# Patient Record
Sex: FEMALE | Race: BLACK OR AFRICAN AMERICAN | Employment: OTHER | URBAN - METROPOLITAN AREA
[De-identification: names, ages, dates, MRNs, and addresses within clinical notes are randomized per-mention and may not be internally consistent; named-entity substitution may affect disease eponyms.]

---

## 2017-01-04 ENCOUNTER — TELEPHONE (OUTPATIENT)
Dept: INTERNAL MEDICINE CLINIC | Age: 67
End: 2017-01-04

## 2017-01-05 ENCOUNTER — OFFICE VISIT (OUTPATIENT)
Dept: INTERNAL MEDICINE CLINIC | Age: 67
End: 2017-01-05

## 2017-01-05 VITALS
OXYGEN SATURATION: 97 % | HEIGHT: 66 IN | TEMPERATURE: 98.2 F | HEART RATE: 83 BPM | SYSTOLIC BLOOD PRESSURE: 138 MMHG | RESPIRATION RATE: 14 BRPM | DIASTOLIC BLOOD PRESSURE: 78 MMHG | WEIGHT: 238.6 LBS | BODY MASS INDEX: 38.35 KG/M2

## 2017-01-05 DIAGNOSIS — R80.9 MICROALBUMINURIA: ICD-10-CM

## 2017-01-05 DIAGNOSIS — E55.9 VITAMIN D DEFICIENCY: ICD-10-CM

## 2017-01-05 DIAGNOSIS — I10 ESSENTIAL HYPERTENSION: ICD-10-CM

## 2017-01-05 DIAGNOSIS — H04.203 WATERY EYES: ICD-10-CM

## 2017-01-05 PROCEDURE — 99214 OFFICE O/P EST MOD 30 MIN: CPT | Performed by: INTERNAL MEDICINE

## 2017-01-05 RX ORDER — GLIPIZIDE 5 MG/1
5 TABLET, FILM COATED, EXTENDED RELEASE ORAL DAILY
Qty: 30 TABLET | Refills: 3 | Status: SHIPPED | OUTPATIENT
Start: 2017-01-05 | End: 2017-03-23

## 2017-01-05 RX ORDER — OLOPATADINE HYDROCHLORIDE 2 MG/ML
1 SOLUTION/ DROPS OPHTHALMIC DAILY
Qty: 2.5 ML | Refills: 3 | Status: SHIPPED | OUTPATIENT
Start: 2017-01-05 | End: 2017-08-16 | Stop reason: SDUPTHER

## 2017-01-05 RX ORDER — ROSUVASTATIN CALCIUM 20 MG/1
TABLET, COATED ORAL
Qty: 90 TABLET | Refills: 1 | Status: SHIPPED | OUTPATIENT
Start: 2017-01-05 | End: 2017-03-23 | Stop reason: ALTCHOICE

## 2017-01-05 RX ORDER — VALSARTAN AND HYDROCHLOROTHIAZIDE 160; 25 MG/1; MG/1
1 TABLET ORAL DAILY
Qty: 90 TABLET | Refills: 1 | Status: SHIPPED | OUTPATIENT
Start: 2017-01-05 | End: 2017-08-16 | Stop reason: CLARIF

## 2017-01-05 RX ORDER — CALCIUM CARB/VITAMIN D3/VIT K1 500-100-40
TABLET,CHEWABLE ORAL
Qty: 200 EACH | Refills: 3 | Status: SHIPPED | OUTPATIENT
Start: 2017-01-05 | End: 2018-08-24 | Stop reason: SDUPTHER

## 2017-01-05 RX ORDER — IBUPROFEN 800 MG/1
800 TABLET ORAL 3 TIMES DAILY PRN
Qty: 60 TABLET | Refills: 0 | Status: SHIPPED | OUTPATIENT
Start: 2017-01-05 | End: 2020-08-29 | Stop reason: SDUPTHER

## 2017-01-06 ENCOUNTER — TELEPHONE (OUTPATIENT)
Dept: INTERNAL MEDICINE CLINIC | Age: 67
End: 2017-01-06

## 2017-01-09 ASSESSMENT — ENCOUNTER SYMPTOMS
VOMITING: 0
NAUSEA: 0
WHEEZING: 0
DIARRHEA: 0
BLOOD IN STOOL: 0
SHORTNESS OF BREATH: 0
SINUS PRESSURE: 0
RHINORRHEA: 0
CHEST TIGHTNESS: 0
SORE THROAT: 0
CONSTIPATION: 0
COUGH: 0
ABDOMINAL PAIN: 0

## 2017-01-10 ENCOUNTER — TELEPHONE (OUTPATIENT)
Dept: INTERNAL MEDICINE CLINIC | Age: 67
End: 2017-01-10

## 2017-01-10 ENCOUNTER — CARE COORDINATION (OUTPATIENT)
Dept: CARE COORDINATION | Age: 67
End: 2017-01-10

## 2017-01-23 ENCOUNTER — CARE COORDINATION (OUTPATIENT)
Dept: CARE COORDINATION | Age: 67
End: 2017-01-23

## 2017-01-31 ENCOUNTER — CARE COORDINATION (OUTPATIENT)
Dept: CARE COORDINATION | Age: 67
End: 2017-01-31

## 2017-02-06 ENCOUNTER — CARE COORDINATION (OUTPATIENT)
Dept: CARE COORDINATION | Age: 67
End: 2017-02-06

## 2017-03-09 RX ORDER — ROSUVASTATIN CALCIUM 20 MG/1
20 TABLET, FILM COATED ORAL NIGHTLY
Qty: 90 TABLET | Refills: 1 | Status: SHIPPED | OUTPATIENT
Start: 2017-03-09 | End: 2017-09-09 | Stop reason: SDUPTHER

## 2017-03-23 ENCOUNTER — OFFICE VISIT (OUTPATIENT)
Dept: INTERNAL MEDICINE CLINIC | Age: 67
End: 2017-03-23

## 2017-03-23 VITALS
TEMPERATURE: 98.2 F | BODY MASS INDEX: 37.99 KG/M2 | HEART RATE: 86 BPM | RESPIRATION RATE: 14 BRPM | OXYGEN SATURATION: 98 % | WEIGHT: 236.4 LBS | HEIGHT: 66 IN | DIASTOLIC BLOOD PRESSURE: 70 MMHG | SYSTOLIC BLOOD PRESSURE: 138 MMHG

## 2017-03-23 DIAGNOSIS — E78.5 HYPERLIPIDEMIA, UNSPECIFIED HYPERLIPIDEMIA TYPE: ICD-10-CM

## 2017-03-23 DIAGNOSIS — I10 ESSENTIAL HYPERTENSION: ICD-10-CM

## 2017-03-23 LAB — HBA1C MFR BLD: 11.3 %

## 2017-03-23 PROCEDURE — 83036 HEMOGLOBIN GLYCOSYLATED A1C: CPT | Performed by: INTERNAL MEDICINE

## 2017-03-23 PROCEDURE — 99214 OFFICE O/P EST MOD 30 MIN: CPT | Performed by: INTERNAL MEDICINE

## 2017-03-23 PROCEDURE — 4010F ACE/ARB THERAPY RXD/TAKEN: CPT | Performed by: INTERNAL MEDICINE

## 2017-03-23 RX ORDER — INSULIN LISPRO 100 [IU]/ML
INJECTION, SOLUTION INTRAVENOUS; SUBCUTANEOUS
Qty: 45 ML | Refills: 2 | OUTPATIENT
Start: 2017-03-23

## 2017-03-23 ASSESSMENT — ENCOUNTER SYMPTOMS
SORE THROAT: 0
DIARRHEA: 0
CHEST TIGHTNESS: 0
COUGH: 0
NAUSEA: 0
VOMITING: 0
CONSTIPATION: 0
ABDOMINAL PAIN: 0
RHINORRHEA: 0
BLOOD IN STOOL: 0
SINUS PRESSURE: 0
SHORTNESS OF BREATH: 0
WHEEZING: 0

## 2017-04-04 RX ORDER — INSULIN GLARGINE 100 [IU]/ML
INJECTION, SOLUTION SUBCUTANEOUS
Qty: 40 ML | Refills: 5 | Status: SHIPPED | OUTPATIENT
Start: 2017-04-04 | End: 2017-11-06 | Stop reason: SDUPTHER

## 2017-04-11 DIAGNOSIS — I10 ESSENTIAL HYPERTENSION: ICD-10-CM

## 2017-04-11 RX ORDER — BLOOD-GLUCOSE METER
EACH MISCELLANEOUS
Qty: 1 KIT | Refills: 0 | Status: CANCELLED | OUTPATIENT
Start: 2017-04-11

## 2017-04-11 RX ORDER — VALSARTAN AND HYDROCHLOROTHIAZIDE 160; 25 MG/1; MG/1
1 TABLET ORAL DAILY
Qty: 90 TABLET | Refills: 1 | Status: CANCELLED | OUTPATIENT
Start: 2017-04-11

## 2017-04-13 RX ORDER — BLOOD-GLUCOSE METER
EACH MISCELLANEOUS
Qty: 1 KIT | Refills: 0 | Status: SHIPPED | OUTPATIENT
Start: 2017-04-13

## 2017-04-18 ENCOUNTER — CARE COORDINATION (OUTPATIENT)
Dept: CARE COORDINATION | Age: 67
End: 2017-04-18

## 2017-04-19 ENCOUNTER — TELEPHONE (OUTPATIENT)
Dept: INTERNAL MEDICINE CLINIC | Age: 67
End: 2017-04-19

## 2017-04-21 ENCOUNTER — TELEPHONE (OUTPATIENT)
Dept: INTERNAL MEDICINE CLINIC | Age: 67
End: 2017-04-21

## 2017-05-16 ENCOUNTER — TELEPHONE (OUTPATIENT)
Dept: INTERNAL MEDICINE CLINIC | Age: 67
End: 2017-05-16

## 2017-05-18 ENCOUNTER — TELEPHONE (OUTPATIENT)
Dept: INTERNAL MEDICINE CLINIC | Age: 67
End: 2017-05-18

## 2017-06-03 ENCOUNTER — TELEPHONE (OUTPATIENT)
Dept: INTERNAL MEDICINE CLINIC | Age: 67
End: 2017-06-03

## 2017-06-06 RX ORDER — LOSARTAN POTASSIUM AND HYDROCHLOROTHIAZIDE 25; 100 MG/1; MG/1
TABLET ORAL
Qty: 90 TABLET | Refills: 0 | Status: SHIPPED | OUTPATIENT
Start: 2017-06-06 | End: 2017-09-09 | Stop reason: SDUPTHER

## 2017-06-13 LAB
DIABETIC RETINOPATHY: NORMAL
DIABETIC RETINOPATHY: NORMAL

## 2017-07-06 DIAGNOSIS — I10 ESSENTIAL HYPERTENSION: ICD-10-CM

## 2017-07-06 RX ORDER — VALSARTAN AND HYDROCHLOROTHIAZIDE 160; 25 MG/1; MG/1
1 TABLET ORAL DAILY
Qty: 90 TABLET | Refills: 1 | Status: CANCELLED | OUTPATIENT
Start: 2017-07-06

## 2017-07-24 LAB — DIABETIC RETINOPATHY: NORMAL

## 2017-08-16 ENCOUNTER — OFFICE VISIT (OUTPATIENT)
Dept: INTERNAL MEDICINE CLINIC | Age: 67
End: 2017-08-16

## 2017-08-16 VITALS
OXYGEN SATURATION: 97 % | DIASTOLIC BLOOD PRESSURE: 82 MMHG | BODY MASS INDEX: 38.57 KG/M2 | HEART RATE: 88 BPM | HEIGHT: 66 IN | TEMPERATURE: 98.1 F | WEIGHT: 240 LBS | SYSTOLIC BLOOD PRESSURE: 130 MMHG

## 2017-08-16 DIAGNOSIS — H57.89 EYE REDNESS: Primary | ICD-10-CM

## 2017-08-16 DIAGNOSIS — Z91.199 NON-COMPLIANCE WITH TREATMENT: ICD-10-CM

## 2017-08-16 DIAGNOSIS — H10.13 ALLERGIC CONJUNCTIVITIS, BILATERAL: ICD-10-CM

## 2017-08-16 DIAGNOSIS — Z79.4 TYPE 2 DIABETES MELLITUS WITH BOTH EYES AFFECTED BY RETINOPATHY AND MACULAR EDEMA, WITH LONG-TERM CURRENT USE OF INSULIN, UNSPECIFIED RETINOPATHY SEVERITY (HCC): ICD-10-CM

## 2017-08-16 DIAGNOSIS — H61.23 BILATERAL IMPACTED CERUMEN: ICD-10-CM

## 2017-08-16 DIAGNOSIS — E11.311 TYPE 2 DIABETES MELLITUS WITH BOTH EYES AFFECTED BY RETINOPATHY AND MACULAR EDEMA, WITH LONG-TERM CURRENT USE OF INSULIN, UNSPECIFIED RETINOPATHY SEVERITY (HCC): ICD-10-CM

## 2017-08-16 LAB — HBA1C MFR BLD: 10.3 %

## 2017-08-16 PROCEDURE — 83036 HEMOGLOBIN GLYCOSYLATED A1C: CPT | Performed by: NURSE PRACTITIONER

## 2017-08-16 PROCEDURE — 99214 OFFICE O/P EST MOD 30 MIN: CPT | Performed by: NURSE PRACTITIONER

## 2017-08-16 RX ORDER — KETOTIFEN FUMARATE 0.35 MG/ML
1 SOLUTION/ DROPS OPHTHALMIC 2 TIMES DAILY
Qty: 10 ML | Refills: 0 | Status: SHIPPED | OUTPATIENT
Start: 2017-08-16 | End: 2017-08-16 | Stop reason: ALTCHOICE

## 2017-08-16 RX ORDER — OLOPATADINE HYDROCHLORIDE 2 MG/ML
1 SOLUTION/ DROPS OPHTHALMIC DAILY
Qty: 2.5 ML | Refills: 3 | Status: SHIPPED | OUTPATIENT
Start: 2017-08-16 | End: 2018-03-02 | Stop reason: SDUPTHER

## 2017-08-16 RX ORDER — TRIAMCINOLONE ACETONIDE 55 UG/1
2 SPRAY, METERED NASAL DAILY
Qty: 1 INHALER | Refills: 3 | Status: SHIPPED | OUTPATIENT
Start: 2017-08-16 | End: 2019-07-25

## 2017-08-16 ASSESSMENT — PATIENT HEALTH QUESTIONNAIRE - PHQ9
SUM OF ALL RESPONSES TO PHQ9 QUESTIONS 1 & 2: 0
SUM OF ALL RESPONSES TO PHQ QUESTIONS 1-9: 0
1. LITTLE INTEREST OR PLEASURE IN DOING THINGS: 0
2. FEELING DOWN, DEPRESSED OR HOPELESS: 0

## 2017-08-16 ASSESSMENT — ENCOUNTER SYMPTOMS: RHINORRHEA: 0

## 2017-08-30 ENCOUNTER — HOSPITAL ENCOUNTER (OUTPATIENT)
Dept: DIABETES SERVICES | Age: 67
Discharge: HOME | End: 2017-08-31
Admitting: INTERNAL MEDICINE

## 2017-08-30 DIAGNOSIS — E11.311 TYPE 2 DIABETES MELLITUS WITH DIABETIC RETINOPATHY AND MACULAR EDEMA (HCC): ICD-10-CM

## 2017-08-30 ASSESSMENT — PATIENT HEALTH QUESTIONNAIRE - PHQ9
SUM OF ALL RESPONSES TO PHQ9 QUESTIONS 1 & 2: 0
1. LITTLE INTEREST OR PLEASURE IN DOING THINGS: 0
SUM OF ALL RESPONSES TO PHQ QUESTIONS 1-9: 0
2. FEELING DOWN, DEPRESSED OR HOPELESS: 0

## 2017-09-01 ENCOUNTER — TELEPHONE (OUTPATIENT)
Dept: INTERNAL MEDICINE CLINIC | Age: 67
End: 2017-09-01

## 2017-09-05 ENCOUNTER — TELEPHONE (OUTPATIENT)
Dept: INTERNAL MEDICINE CLINIC | Age: 67
End: 2017-09-05

## 2017-09-06 ENCOUNTER — HOSPITAL ENCOUNTER (OUTPATIENT)
Dept: DIABETES SERVICES | Age: 67
Discharge: HOME | End: 2017-09-06
Admitting: INTERNAL MEDICINE

## 2017-09-06 ENCOUNTER — TELEPHONE (OUTPATIENT)
Dept: INTERNAL MEDICINE CLINIC | Age: 67
End: 2017-09-06

## 2017-09-06 DIAGNOSIS — E11.311 TYPE 2 DIABETES MELLITUS WITH DIABETIC RETINOPATHY AND MACULAR EDEMA (HCC): ICD-10-CM

## 2017-09-11 RX ORDER — ROSUVASTATIN CALCIUM 20 MG/1
TABLET, FILM COATED ORAL
Qty: 90 TABLET | Refills: 0 | Status: SHIPPED | OUTPATIENT
Start: 2017-09-11 | End: 2017-10-08 | Stop reason: SDUPTHER

## 2017-09-11 RX ORDER — LOSARTAN POTASSIUM AND HYDROCHLOROTHIAZIDE 25; 100 MG/1; MG/1
TABLET ORAL
Qty: 90 TABLET | Refills: 0 | Status: SHIPPED | OUTPATIENT
Start: 2017-09-11 | End: 2017-10-08 | Stop reason: SDUPTHER

## 2017-09-14 ENCOUNTER — OFFICE VISIT (OUTPATIENT)
Dept: INTERNAL MEDICINE CLINIC | Age: 67
End: 2017-09-14

## 2017-09-14 VITALS
BODY MASS INDEX: 37.77 KG/M2 | OXYGEN SATURATION: 98 % | RESPIRATION RATE: 14 BRPM | TEMPERATURE: 97.9 F | HEIGHT: 66 IN | DIASTOLIC BLOOD PRESSURE: 72 MMHG | SYSTOLIC BLOOD PRESSURE: 150 MMHG | WEIGHT: 235 LBS | HEART RATE: 85 BPM

## 2017-09-14 DIAGNOSIS — Z12.31 ENCOUNTER FOR SCREENING MAMMOGRAM FOR BREAST CANCER: ICD-10-CM

## 2017-09-14 DIAGNOSIS — R09.89 PHLEGM IN THROAT: ICD-10-CM

## 2017-09-14 DIAGNOSIS — E78.5 HYPERLIPIDEMIA, UNSPECIFIED HYPERLIPIDEMIA TYPE: ICD-10-CM

## 2017-09-14 DIAGNOSIS — I10 ESSENTIAL HYPERTENSION: ICD-10-CM

## 2017-09-14 DIAGNOSIS — L60.0 INGROWN TOENAIL: ICD-10-CM

## 2017-09-14 PROCEDURE — 99214 OFFICE O/P EST MOD 30 MIN: CPT | Performed by: INTERNAL MEDICINE

## 2017-09-14 RX ORDER — LORATADINE 10 MG/1
10 TABLET ORAL DAILY
Qty: 30 TABLET | Refills: 0 | Status: SHIPPED | OUTPATIENT
Start: 2017-09-14 | End: 2019-07-25

## 2017-09-19 ASSESSMENT — ENCOUNTER SYMPTOMS
SHORTNESS OF BREATH: 0
SORE THROAT: 0
COUGH: 0
NAUSEA: 0
ABDOMINAL PAIN: 0
SINUS PRESSURE: 0
RHINORRHEA: 0
VOMITING: 0
DIARRHEA: 0
CHEST TIGHTNESS: 0
CONSTIPATION: 0
WHEEZING: 0
BLOOD IN STOOL: 0

## 2017-09-20 ENCOUNTER — TELEPHONE (OUTPATIENT)
Dept: INTERNAL MEDICINE CLINIC | Age: 67
End: 2017-09-20

## 2017-10-04 ENCOUNTER — HOSPITAL ENCOUNTER (OUTPATIENT)
Dept: MAMMOGRAPHY | Age: 67
Discharge: HOME | End: 2017-10-04
Admitting: INTERNAL MEDICINE

## 2017-10-04 ENCOUNTER — HOSPITAL ENCOUNTER (OUTPATIENT)
Dept: DIABETES SERVICES | Age: 67
Discharge: HOME | End: 2017-10-04
Admitting: INTERNAL MEDICINE

## 2017-10-04 DIAGNOSIS — Z12.31 VISIT FOR SCREENING MAMMOGRAM: ICD-10-CM

## 2017-10-04 DIAGNOSIS — E11.311 TYPE 2 DIABETES MELLITUS WITH DIABETIC RETINOPATHY AND MACULAR EDEMA (HCC): ICD-10-CM

## 2017-10-04 NOTE — PROGRESS NOTES
Patient attended RD2 Group Class  Reviewed and patient demonstrated understanding of the following:  Nutrition guidelines to maintain cardiovascular health  Strategies to deal with social events, restaurant meals, and emotion-based eating  Goal Setting    REFERRING PROVIDER: Fabi Tran MD    Total participants in Group:1 (sole participant in scheduled group class)

## 2017-10-09 RX ORDER — ROSUVASTATIN CALCIUM 20 MG/1
TABLET, FILM COATED ORAL
Qty: 90 TABLET | Refills: 1 | Status: SHIPPED | OUTPATIENT
Start: 2017-10-09 | End: 2018-04-04 | Stop reason: SDUPTHER

## 2017-10-09 RX ORDER — LOSARTAN POTASSIUM AND HYDROCHLOROTHIAZIDE 25; 100 MG/1; MG/1
TABLET ORAL
Qty: 90 TABLET | Refills: 1 | Status: SHIPPED | OUTPATIENT
Start: 2017-10-09 | End: 2018-04-04 | Stop reason: SDUPTHER

## 2017-11-01 ENCOUNTER — HOSPITAL ENCOUNTER (OUTPATIENT)
Dept: OTHER | Age: 67
Discharge: HOME | End: 2017-11-30
Attending: INTERNAL MEDICINE | Admitting: INTERNAL MEDICINE

## 2017-11-07 RX ORDER — INSULIN GLARGINE 100 [IU]/ML
INJECTION, SOLUTION SUBCUTANEOUS
Qty: 40 ML | Refills: 2 | Status: SHIPPED | OUTPATIENT
Start: 2017-11-07 | End: 2018-08-09 | Stop reason: SDUPTHER

## 2017-11-08 RX ORDER — INSULIN LISPRO 100 [IU]/ML
INJECTION, SOLUTION INTRAVENOUS; SUBCUTANEOUS
Qty: 45 ML | Refills: 2 | OUTPATIENT
Start: 2017-11-08

## 2017-11-08 NOTE — TELEPHONE ENCOUNTER
Spoke with Pharmacist at [facility] and declined refill request for Humalog insulin Kwikpen 90 day supply because Rx sent earlier today will cover 90 days.

## 2017-11-21 ENCOUNTER — OFFICE VISIT (OUTPATIENT)
Dept: GYNECOLOGY | Age: 67
End: 2017-11-21

## 2017-11-21 VITALS
WEIGHT: 239.25 LBS | HEART RATE: 85 BPM | BODY MASS INDEX: 37.55 KG/M2 | DIASTOLIC BLOOD PRESSURE: 83 MMHG | SYSTOLIC BLOOD PRESSURE: 172 MMHG | HEIGHT: 67 IN

## 2017-11-21 DIAGNOSIS — N89.8 VAGINAL DISCHARGE: ICD-10-CM

## 2017-11-21 DIAGNOSIS — N32.81 URGENCY-FREQUENCY SYNDROME: Primary | ICD-10-CM

## 2017-11-21 DIAGNOSIS — R82.90 ABNORMAL FINDING IN URINE: ICD-10-CM

## 2017-11-21 LAB
BACTERIA WET PREP: NORMAL
CLUE CELLS: NORMAL
EPITHELIAL CELLS WET PREP: NORMAL
RBC WET PREP: NORMAL
SOURCE WET PREP: NORMAL
TRICHOMONAS PREP: NORMAL
WBC WET PREP: NORMAL
YEAST WET PREP: NORMAL

## 2017-11-21 PROCEDURE — 1036F TOBACCO NON-USER: CPT | Performed by: OBSTETRICS & GYNECOLOGY

## 2017-11-21 PROCEDURE — 4040F PNEUMOC VAC/ADMIN/RCVD: CPT | Performed by: OBSTETRICS & GYNECOLOGY

## 2017-11-21 PROCEDURE — 1123F ACP DISCUSS/DSCN MKR DOCD: CPT | Performed by: OBSTETRICS & GYNECOLOGY

## 2017-11-21 PROCEDURE — 3017F COLORECTAL CA SCREEN DOC REV: CPT | Performed by: OBSTETRICS & GYNECOLOGY

## 2017-11-21 PROCEDURE — G8484 FLU IMMUNIZE NO ADMIN: HCPCS | Performed by: OBSTETRICS & GYNECOLOGY

## 2017-11-21 PROCEDURE — G8417 CALC BMI ABV UP PARAM F/U: HCPCS | Performed by: OBSTETRICS & GYNECOLOGY

## 2017-11-21 PROCEDURE — 3014F SCREEN MAMMO DOC REV: CPT | Performed by: OBSTETRICS & GYNECOLOGY

## 2017-11-21 PROCEDURE — G8399 PT W/DXA RESULTS DOCUMENT: HCPCS | Performed by: OBSTETRICS & GYNECOLOGY

## 2017-11-21 PROCEDURE — 99203 OFFICE O/P NEW LOW 30 MIN: CPT | Performed by: OBSTETRICS & GYNECOLOGY

## 2017-11-21 PROCEDURE — 1090F PRES/ABSN URINE INCON ASSESS: CPT | Performed by: OBSTETRICS & GYNECOLOGY

## 2017-11-21 PROCEDURE — G8427 DOCREV CUR MEDS BY ELIG CLIN: HCPCS | Performed by: OBSTETRICS & GYNECOLOGY

## 2017-11-21 RX ORDER — CLINDAMYCIN PHOSPHATE 20 MG/G
1 CREAM VAGINAL NIGHTLY
Qty: 1 TUBE | Refills: 0 | Status: SHIPPED | OUTPATIENT
Start: 2017-11-21 | End: 2017-11-28

## 2017-11-23 LAB
GENITAL CULTURE, ROUTINE: NORMAL
URINE CULTURE, ROUTINE: NORMAL

## 2017-11-24 LAB
C TRACH DNA GENITAL QL NAA+PROBE: NEGATIVE
N. GONORRHOEAE DNA: NEGATIVE

## 2017-11-26 ASSESSMENT — ENCOUNTER SYMPTOMS
GASTROINTESTINAL NEGATIVE: 1
RESPIRATORY NEGATIVE: 1
EYES NEGATIVE: 1

## 2017-11-26 NOTE — PROGRESS NOTES
Subjective:      Patient ID: Sonia Quintero is a 79 y.o. female. Patient is here with vaginal discharge and odor. Patient frustrated with symptoms. Review of Systems   Constitutional: Negative. HENT: Negative. Eyes: Negative. Respiratory: Negative. Cardiovascular: Negative. Gastrointestinal: Negative. Genitourinary: Positive for frequency and vaginal discharge. Musculoskeletal: Negative. Skin: Negative. Neurological: Negative. Psychiatric/Behavioral: Negative.       Date of Birth 1950  Past Medical History:   Diagnosis Date    Hyperlipidemia     Hypertension     Type II or unspecified type diabetes mellitus without mention of complication, not stated as uncontrolled      Past Surgical History:   Procedure Laterality Date     SECTION      x 1     TONSILLECTOMY       OB History    Para Term  AB Living   1 1           SAB TAB Ectopic Molar Multiple Live Births             1      # Outcome Date GA Lbr Keith/2nd Weight Sex Delivery Anes PTL Lv   1 Para      CS-LTranv         Complications: Breech presentation        Social History     Social History    Marital status: Single     Spouse name: N/A    Number of children: N/A    Years of education: N/A     Occupational History     at [employer]      Social History Main Topics    Smoking status: Never Smoker    Smokeless tobacco: Never Used    Alcohol use No    Drug use: No    Sexual activity: Not Currently     Other Topics Concern    Not on file     Social History Narrative    No narrative on file     Allergies   Allergen Reactions    Actos [Pioglitazone] Diarrhea    Avandia [Rosiglitazone]     Dye [Iodides]     Januvia [Sitagliptin Phosphate]      Side effects    Metformin      Nausea       Outpatient Prescriptions Marked as Taking for the 17 encounter (Office Visit) with Lawson Acevedo MD   Medication Sig Dispense Refill    clindamycin (CLEOCIN) 2 % vaginal

## 2017-12-06 RX ORDER — INSULIN GLARGINE 100 [IU]/ML
INJECTION, SOLUTION SUBCUTANEOUS
Qty: 40 ML | Refills: 0 | Status: SHIPPED | OUTPATIENT
Start: 2017-12-06 | End: 2018-03-05 | Stop reason: SDUPTHER

## 2018-01-02 ENCOUNTER — TELEPHONE (OUTPATIENT)
Dept: INTERNAL MEDICINE CLINIC | Age: 68
End: 2018-01-02

## 2018-01-02 NOTE — TELEPHONE ENCOUNTER
Patient had apt today and no showed -call was made pt sts she has been sick and forgot about the apt -will call back to [facility] apt when she feeling better

## 2018-01-16 ENCOUNTER — TELEPHONE (OUTPATIENT)
Dept: INTERNAL MEDICINE CLINIC | Age: 68
End: 2018-01-16

## 2018-01-16 DIAGNOSIS — R09.82 POSTNASAL DRIP: ICD-10-CM

## 2018-01-16 DIAGNOSIS — R05.9 COUGH: Primary | ICD-10-CM

## 2018-01-16 RX ORDER — BENZONATATE 100 MG/1
100 CAPSULE ORAL 3 TIMES DAILY PRN
Qty: 30 CAPSULE | Refills: 0 | Status: SHIPPED | OUTPATIENT
Start: 2018-01-16 | End: 2018-01-23

## 2018-01-16 RX ORDER — LORATADINE 10 MG/1
10 TABLET ORAL DAILY
Qty: 15 TABLET | Refills: 0 | Status: SHIPPED | OUTPATIENT
Start: 2018-01-16 | End: 2018-05-08 | Stop reason: SDUPTHER

## 2018-01-16 RX ORDER — GUAIFENESIN AND DEXTROMETHORPHAN HYDROBROMIDE 600; 30 MG/1; MG/1
1 TABLET, EXTENDED RELEASE ORAL 2 TIMES DAILY
Qty: 20 TABLET | Refills: 0 | Status: SHIPPED | OUTPATIENT
Start: 2018-01-16 | End: 2018-08-27

## 2018-02-15 ENCOUNTER — OFFICE VISIT (OUTPATIENT)
Dept: GYNECOLOGY | Age: 68
End: 2018-02-15

## 2018-02-15 VITALS
SYSTOLIC BLOOD PRESSURE: 140 MMHG | WEIGHT: 236.13 LBS | RESPIRATION RATE: 17 BRPM | TEMPERATURE: 98 F | HEART RATE: 82 BPM | DIASTOLIC BLOOD PRESSURE: 84 MMHG | HEIGHT: 66 IN | BODY MASS INDEX: 37.95 KG/M2

## 2018-02-15 DIAGNOSIS — Z01.419 WELL WOMAN EXAM WITH ROUTINE GYNECOLOGICAL EXAM: Primary | ICD-10-CM

## 2018-02-15 DIAGNOSIS — N89.8 VAGINAL ODOR: ICD-10-CM

## 2018-02-15 DIAGNOSIS — Z78.0 MENOPAUSE: ICD-10-CM

## 2018-02-15 PROCEDURE — G0101 CA SCREEN;PELVIC/BREAST EXAM: HCPCS | Performed by: OBSTETRICS & GYNECOLOGY

## 2018-02-16 LAB
BACTERIA WET PREP: ABNORMAL
CLUE CELLS: ABNORMAL
EPITHELIAL CELLS WET PREP: ABNORMAL
RBC WET PREP: ABNORMAL
SOURCE WET PREP: ABNORMAL
TRICHOMONAS PREP: ABNORMAL
WBC WET PREP: ABNORMAL
YEAST WET PREP: ABNORMAL

## 2018-02-16 RX ORDER — FLUCONAZOLE 150 MG/1
150 TABLET ORAL ONCE
Qty: 1 TABLET | Refills: 1 | OUTPATIENT
Start: 2018-02-16 | End: 2018-02-16

## 2018-02-17 LAB — GENITAL CULTURE, ROUTINE: NORMAL

## 2018-02-19 LAB
HPV COMMENT: NORMAL
HPV TYPE 16: NOT DETECTED
HPV TYPE 18: NOT DETECTED
HPVOH (OTHER TYPES): NOT DETECTED

## 2018-02-19 ASSESSMENT — ENCOUNTER SYMPTOMS
EYES NEGATIVE: 1
GASTROINTESTINAL NEGATIVE: 1
RESPIRATORY NEGATIVE: 1

## 2018-02-20 LAB
FINAL REPORT: NORMAL
PRELIMINARY: NORMAL

## 2018-02-20 NOTE — PROGRESS NOTES
MG TB12 Take 1 tablet by mouth 2 times daily 20 tablet 0    loratadine (CLARITIN) 10 MG tablet Take 1 tablet by mouth daily 15 tablet 0    LANTUS 100 UNIT/ML injection vial INJECT 60 UNITS UNDER THE SKIN EVERY MORNING AND 60 UNITS EVERY NIGHT AT BEDTIME 40 mL 0    hydrocortisone 2.5 % cream APPLY EXTERNALLY TO THE AFFECTED AREA TWICE DAILY 30 g 0    LANTUS 100 UNIT/ML injection vial ADMINISTER 60 UNITS UNDER THE SKIN EVERY MORNING AND EVERY NIGHT AT BEDTIME 40 mL 2    insulin lispro (HUMALOG KWIKPEN) 100 UNIT/ML pen Inject 4 Units into the skin 3 times daily (before meals) 2 Pen 2    CRESTOR 20 MG tablet TAKE 1 TABLET BY MOUTH EVERY NIGHT 90 tablet 1    losartan-hydrochlorothiazide (HYZAAR) 100-25 MG per tablet TAKE 1 TABLET BY MOUTH DAILY 90 tablet 1    loratadine (CLARITIN) 10 MG tablet Take 1 tablet by mouth daily 30 tablet 0    triamcinolone (NASACORT ALLERGY 24HR) 55 MCG/ACT nasal inhaler 2 sprays by Nasal route daily 1 Inhaler 3    olopatadine (PATADAY) 0.2 % SOLN ophthalmic solution Place 1 drop into both eyes daily 2.5 mL 3    Blood Glucose Monitoring Suppl (ACCU-CHEK ANJU PLUS) W/DEVICE KIT Use to monitor blood sugars 1 kit 0    glucose blood VI test strips (ACCU-CHEK ANJU PLUS) strip Monitor blood sugar 3 times daily 300 each 3    Insulin Syringe-Needle U-100 (INSULIN SYRINGE 1CC/31GX5/16\") 31G X 5/16\" 1 ML MISC Use with Lantus insulin twice daily 200 each 3    ibuprofen (ADVIL;MOTRIN) 800 MG tablet Take 1 tablet by mouth 3 times daily as needed for Pain 60 tablet 0    Compression Stockings MISC by Does not apply route Dispense 1 pair of compression stockings 15-20mmHg 1 each 0    aspirin 81 MG EC tablet Take 81 mg by mouth daily.          Family History   Problem Relation Age of Onset    Diabetes Sister     Heart Disease Sister 77    Diabetes Brother      had amputation    High Blood Pressure Brother     Cancer Father      BP (!) 140/84 (Site: Left Arm, Position: Sitting, Cuff Size: Large Adult)   Pulse 82   Temp 98 °F (36.7 °C) (Oral)   Resp 17   Ht 5' 6\" (1.676 m)   Wt 236 lb 2 oz (107.1 kg)   Breastfeeding? No   BMI 38.11 kg/m²       Objective:   Physical Exam   Constitutional: She is oriented to person, place, and time. She appears well-developed and well-nourished. No distress. HENT:   Head: Normocephalic and atraumatic. Eyes: EOM are normal. Pupils are equal, round, and reactive to light. Neck: Normal range of motion. Neck supple. No thyromegaly present. Cardiovascular: Normal rate, regular rhythm and normal heart sounds. Exam reveals no gallop and no friction rub. No murmur heard. Pulmonary/Chest: Effort normal and breath sounds normal. No respiratory distress. She has no wheezes. She has no rales. Abdominal: Soft. Bowel sounds are normal. She exhibits no distension and no mass. There is no hepatomegaly. There is no tenderness. There is no rebound and no guarding. No hernia. Genitourinary: Rectum normal, vagina normal and uterus normal. Rectal exam shows no external hemorrhoid, no internal hemorrhoid, no fissure, no mass, no tenderness and guaiac negative stool. No breast swelling, tenderness, discharge or bleeding. There is no rash, tenderness, lesion or injury on the right labia. There is no rash, tenderness, lesion or injury on the left labia. Uterus is not deviated, not enlarged, not fixed and not tender. Cervix exhibits no motion tenderness, no discharge and no friability. Right adnexum displays no mass, no tenderness and no fullness. Left adnexum displays no mass, no tenderness and no fullness. No erythema, tenderness or bleeding in the vagina. No foreign body in the vagina. No signs of injury around the vagina. No vaginal discharge found. Genitourinary Comments: Normal urethral meatus, nl urethra, nl bladder. Musculoskeletal: Normal range of motion. She exhibits no edema or tenderness. Lymphadenopathy:     She has no cervical adenopathy.

## 2018-03-02 DIAGNOSIS — H10.13 ALLERGIC CONJUNCTIVITIS, BILATERAL: ICD-10-CM

## 2018-03-02 DIAGNOSIS — H57.89 EYE REDNESS: ICD-10-CM

## 2018-03-02 RX ORDER — OLOPATADINE HYDROCHLORIDE 2 MG/ML
SOLUTION/ DROPS OPHTHALMIC
Qty: 2.5 ML | Refills: 0 | Status: SHIPPED | OUTPATIENT
Start: 2018-03-02 | End: 2018-04-04 | Stop reason: SDUPTHER

## 2018-03-02 RX ORDER — INSULIN LISPRO 100 [IU]/ML
INJECTION, SOLUTION INTRAVENOUS; SUBCUTANEOUS
Qty: 15 ML | Refills: 0 | Status: CANCELLED | OUTPATIENT
Start: 2018-03-02

## 2018-03-05 RX ORDER — INSULIN GLARGINE 100 [IU]/ML
INJECTION, SOLUTION SUBCUTANEOUS
Qty: 40 ML | Refills: 3 | Status: SHIPPED | OUTPATIENT
Start: 2018-03-05 | End: 2018-07-24 | Stop reason: SDUPTHER

## 2018-03-06 RX ORDER — INSULIN GLARGINE 100 [IU]/ML
INJECTION, SOLUTION SUBCUTANEOUS
Qty: 40 ML | Refills: 0 | OUTPATIENT
Start: 2018-03-06

## 2018-04-04 DIAGNOSIS — H10.13 ALLERGIC CONJUNCTIVITIS, BILATERAL: ICD-10-CM

## 2018-04-04 DIAGNOSIS — H57.89 EYE REDNESS: ICD-10-CM

## 2018-04-07 RX ORDER — LOSARTAN POTASSIUM AND HYDROCHLOROTHIAZIDE 25; 100 MG/1; MG/1
TABLET ORAL
Qty: 90 TABLET | Refills: 0 | Status: SHIPPED | OUTPATIENT
Start: 2018-04-07 | End: 2018-06-06 | Stop reason: SDUPTHER

## 2018-04-07 RX ORDER — OLOPATADINE HYDROCHLORIDE 2 MG/ML
SOLUTION/ DROPS OPHTHALMIC
Qty: 2.5 ML | Refills: 0 | Status: SHIPPED | OUTPATIENT
Start: 2018-04-07 | End: 2018-05-08 | Stop reason: SDUPTHER

## 2018-04-07 RX ORDER — ROSUVASTATIN CALCIUM 20 MG/1
TABLET, FILM COATED ORAL
Qty: 90 TABLET | Refills: 0 | Status: SHIPPED | OUTPATIENT
Start: 2018-04-07 | End: 2018-06-11 | Stop reason: SDUPTHER

## 2018-04-20 ENCOUNTER — TELEPHONE (OUTPATIENT)
Dept: INTERNAL MEDICINE CLINIC | Age: 68
End: 2018-04-20

## 2018-05-04 ENCOUNTER — TELEPHONE (OUTPATIENT)
Dept: FAMILY MEDICINE CLINIC | Age: 68
End: 2018-05-04

## 2018-05-08 ENCOUNTER — OFFICE VISIT (OUTPATIENT)
Dept: INTERNAL MEDICINE CLINIC | Age: 68
End: 2018-05-08

## 2018-05-08 VITALS
SYSTOLIC BLOOD PRESSURE: 138 MMHG | BODY MASS INDEX: 38.63 KG/M2 | HEART RATE: 67 BPM | DIASTOLIC BLOOD PRESSURE: 60 MMHG | HEIGHT: 66 IN | OXYGEN SATURATION: 97 % | WEIGHT: 240.4 LBS

## 2018-05-08 DIAGNOSIS — H10.13 ALLERGIC CONJUNCTIVITIS, BILATERAL: ICD-10-CM

## 2018-05-08 DIAGNOSIS — E78.2 MIXED HYPERLIPIDEMIA: ICD-10-CM

## 2018-05-08 DIAGNOSIS — I10 ESSENTIAL HYPERTENSION: ICD-10-CM

## 2018-05-08 DIAGNOSIS — Z23 NEED FOR SHINGLES VACCINE: ICD-10-CM

## 2018-05-08 DIAGNOSIS — R42 DIZZINESS: ICD-10-CM

## 2018-05-08 DIAGNOSIS — E55.9 VITAMIN D DEFICIENCY: ICD-10-CM

## 2018-05-08 LAB
CREATININE URINE: 35.3 MG/DL (ref 28–259)
HBA1C MFR BLD: 9.6 %
MICROALBUMIN UR-MCNC: 6.2 MG/DL
MICROALBUMIN/CREAT UR-RTO: 175.6 MG/G (ref 0–30)

## 2018-05-08 PROCEDURE — G8417 CALC BMI ABV UP PARAM F/U: HCPCS | Performed by: INTERNAL MEDICINE

## 2018-05-08 PROCEDURE — G8399 PT W/DXA RESULTS DOCUMENT: HCPCS | Performed by: INTERNAL MEDICINE

## 2018-05-08 PROCEDURE — 2022F DILAT RTA XM EVC RTNOPTHY: CPT | Performed by: INTERNAL MEDICINE

## 2018-05-08 PROCEDURE — 81002 URINALYSIS NONAUTO W/O SCOPE: CPT | Performed by: INTERNAL MEDICINE

## 2018-05-08 PROCEDURE — 1036F TOBACCO NON-USER: CPT | Performed by: INTERNAL MEDICINE

## 2018-05-08 PROCEDURE — 83036 HEMOGLOBIN GLYCOSYLATED A1C: CPT | Performed by: INTERNAL MEDICINE

## 2018-05-08 PROCEDURE — 1123F ACP DISCUSS/DSCN MKR DOCD: CPT | Performed by: INTERNAL MEDICINE

## 2018-05-08 PROCEDURE — 4040F PNEUMOC VAC/ADMIN/RCVD: CPT | Performed by: INTERNAL MEDICINE

## 2018-05-08 PROCEDURE — G8427 DOCREV CUR MEDS BY ELIG CLIN: HCPCS | Performed by: INTERNAL MEDICINE

## 2018-05-08 PROCEDURE — 99214 OFFICE O/P EST MOD 30 MIN: CPT | Performed by: INTERNAL MEDICINE

## 2018-05-08 PROCEDURE — 1090F PRES/ABSN URINE INCON ASSESS: CPT | Performed by: INTERNAL MEDICINE

## 2018-05-08 PROCEDURE — 3046F HEMOGLOBIN A1C LEVEL >9.0%: CPT | Performed by: INTERNAL MEDICINE

## 2018-05-08 PROCEDURE — 3017F COLORECTAL CA SCREEN DOC REV: CPT | Performed by: INTERNAL MEDICINE

## 2018-05-08 RX ORDER — BRIMONIDINE TARTRATE/TIMOLOL 0.2%-0.5%
DROPS OPHTHALMIC (EYE)
Refills: 6 | COMMUNITY
Start: 2018-04-26 | End: 2019-08-05

## 2018-05-08 RX ORDER — OLOPATADINE HYDROCHLORIDE 2 MG/ML
SOLUTION/ DROPS OPHTHALMIC
Qty: 2.5 ML | Refills: 2 | Status: SHIPPED | OUTPATIENT
Start: 2018-05-08 | End: 2018-08-09 | Stop reason: SDUPTHER

## 2018-05-15 DIAGNOSIS — R42 DIZZINESS: ICD-10-CM

## 2018-05-15 DIAGNOSIS — E55.9 VITAMIN D DEFICIENCY: ICD-10-CM

## 2018-05-15 DIAGNOSIS — E78.2 MIXED HYPERLIPIDEMIA: ICD-10-CM

## 2018-05-15 DIAGNOSIS — I10 ESSENTIAL HYPERTENSION: ICD-10-CM

## 2018-05-15 LAB
A/G RATIO: 1.4 (ref 1.1–2.2)
ALBUMIN SERPL-MCNC: 3.8 G/DL (ref 3.4–5)
ALP BLD-CCNC: 85 U/L (ref 40–129)
ALT SERPL-CCNC: 13 U/L (ref 10–40)
ANION GAP SERPL CALCULATED.3IONS-SCNC: 13 MMOL/L (ref 3–16)
AST SERPL-CCNC: 17 U/L (ref 15–37)
BASOPHILS ABSOLUTE: 0.1 K/UL (ref 0–0.2)
BASOPHILS RELATIVE PERCENT: 0.6 %
BILIRUB SERPL-MCNC: 0.3 MG/DL (ref 0–1)
BUN BLDV-MCNC: 30 MG/DL (ref 7–20)
CALCIUM SERPL-MCNC: 8.7 MG/DL (ref 8.3–10.6)
CHLORIDE BLD-SCNC: 101 MMOL/L (ref 99–110)
CHOLESTEROL, TOTAL: 115 MG/DL (ref 0–199)
CO2: 27 MMOL/L (ref 21–32)
CREAT SERPL-MCNC: 1 MG/DL (ref 0.6–1.2)
EOSINOPHILS ABSOLUTE: 0.2 K/UL (ref 0–0.6)
EOSINOPHILS RELATIVE PERCENT: 2.7 %
GFR AFRICAN AMERICAN: >60
GFR NON-AFRICAN AMERICAN: 55
GLOBULIN: 2.7 G/DL
GLUCOSE BLD-MCNC: 77 MG/DL (ref 70–99)
HCT VFR BLD CALC: 31.1 % (ref 36–48)
HDLC SERPL-MCNC: 34 MG/DL (ref 40–60)
HEMOGLOBIN: 10.9 G/DL (ref 12–16)
LDL CHOLESTEROL CALCULATED: 59 MG/DL
LYMPHOCYTES ABSOLUTE: 2.9 K/UL (ref 1–5.1)
LYMPHOCYTES RELATIVE PERCENT: 32.5 %
MCH RBC QN AUTO: 30.6 PG (ref 26–34)
MCHC RBC AUTO-ENTMCNC: 34.9 G/DL (ref 31–36)
MCV RBC AUTO: 87.6 FL (ref 80–100)
MONOCYTES ABSOLUTE: 0.5 K/UL (ref 0–1.3)
MONOCYTES RELATIVE PERCENT: 5.8 %
NEUTROPHILS ABSOLUTE: 5.2 K/UL (ref 1.7–7.7)
NEUTROPHILS RELATIVE PERCENT: 58.4 %
PDW BLD-RTO: 13.4 % (ref 12.4–15.4)
PLATELET # BLD: 364 K/UL (ref 135–450)
PMV BLD AUTO: 6.9 FL (ref 5–10.5)
POTASSIUM SERPL-SCNC: 4.1 MMOL/L (ref 3.5–5.1)
RBC # BLD: 3.56 M/UL (ref 4–5.2)
SODIUM BLD-SCNC: 141 MMOL/L (ref 136–145)
TOTAL PROTEIN: 6.5 G/DL (ref 6.4–8.2)
TRIGL SERPL-MCNC: 108 MG/DL (ref 0–150)
VITAMIN D 25-HYDROXY: 10.5 NG/ML
VLDLC SERPL CALC-MCNC: 22 MG/DL
WBC # BLD: 9 K/UL (ref 4–11)

## 2018-05-15 ASSESSMENT — ENCOUNTER SYMPTOMS
BLOOD IN STOOL: 0
NAUSEA: 0
COUGH: 0
CONSTIPATION: 0
WHEEZING: 0
CHEST TIGHTNESS: 0
ABDOMINAL PAIN: 0
VOMITING: 0
SINUS PRESSURE: 0
SHORTNESS OF BREATH: 0
RHINORRHEA: 0
DIARRHEA: 0
SORE THROAT: 0

## 2018-05-17 ENCOUNTER — TELEPHONE (OUTPATIENT)
Dept: INTERNAL MEDICINE CLINIC | Age: 68
End: 2018-05-17

## 2018-05-18 ENCOUNTER — TELEPHONE (OUTPATIENT)
Dept: INTERNAL MEDICINE CLINIC | Age: 68
End: 2018-05-18

## 2018-05-21 ENCOUNTER — TELEPHONE (OUTPATIENT)
Dept: INTERNAL MEDICINE CLINIC | Age: 68
End: 2018-05-21

## 2018-05-21 DIAGNOSIS — D64.9 ANEMIA, UNSPECIFIED TYPE: Primary | ICD-10-CM

## 2018-05-21 DIAGNOSIS — E55.9 VITAMIN D DEFICIENCY: ICD-10-CM

## 2018-05-21 RX ORDER — ERGOCALCIFEROL 1.25 MG/1
50000 CAPSULE ORAL WEEKLY
Qty: 4 CAPSULE | Refills: 5 | Status: SHIPPED | OUTPATIENT
Start: 2018-05-21 | End: 2018-08-09 | Stop reason: SDUPTHER

## 2018-05-22 ENCOUNTER — TELEPHONE (OUTPATIENT)
Dept: INTERNAL MEDICINE CLINIC | Age: 68
End: 2018-05-22

## 2018-05-22 NOTE — TELEPHONE ENCOUNTER
Call pt. I would like her to have someone take her to the ER or urgent care for dizziness since she is unable to come to the office.

## 2018-05-23 DIAGNOSIS — D64.9 ANEMIA, UNSPECIFIED TYPE: Primary | ICD-10-CM

## 2018-05-23 DIAGNOSIS — D64.9 ANEMIA, UNSPECIFIED TYPE: ICD-10-CM

## 2018-05-23 LAB
FERRITIN: 101.8 NG/ML (ref 15–150)
IRON SATURATION: 23 % (ref 15–50)
IRON: 64 UG/DL (ref 37–145)
TOTAL IRON BINDING CAPACITY: 276 UG/DL (ref 260–445)

## 2018-05-23 NOTE — TELEPHONE ENCOUNTER
Pt was informed of results and to go to urgent care or the ER if she continues to have dizziness. The referral for Hematology has been mailed to patient.

## 2018-05-30 RX ORDER — BLOOD SUGAR DIAGNOSTIC
STRIP MISCELLANEOUS
Qty: 300 STRIP | Refills: 2 | Status: SHIPPED | OUTPATIENT
Start: 2018-05-30 | End: 2019-03-11 | Stop reason: SDUPTHER

## 2018-06-06 RX ORDER — OLOPATADINE HYDROCHLORIDE 2 MG/ML
SOLUTION/ DROPS OPHTHALMIC
Qty: 2.5 ML | Refills: 2 | Status: SHIPPED | OUTPATIENT
Start: 2018-06-06 | End: 2018-09-13 | Stop reason: SDUPTHER

## 2018-06-06 RX ORDER — LOSARTAN POTASSIUM AND HYDROCHLOROTHIAZIDE 25; 100 MG/1; MG/1
TABLET ORAL
Qty: 90 TABLET | Refills: 1 | Status: SHIPPED | OUTPATIENT
Start: 2018-06-06 | End: 2018-12-24 | Stop reason: SDUPTHER

## 2018-06-11 ENCOUNTER — TELEPHONE (OUTPATIENT)
Dept: INTERNAL MEDICINE CLINIC | Age: 68
End: 2018-06-11

## 2018-06-11 DIAGNOSIS — D64.9 ANEMIA, UNSPECIFIED TYPE: Primary | ICD-10-CM

## 2018-06-11 DIAGNOSIS — R42 VERTIGO: ICD-10-CM

## 2018-06-11 DIAGNOSIS — E78.2 MIXED HYPERLIPIDEMIA: ICD-10-CM

## 2018-06-11 DIAGNOSIS — R51.9 NONINTRACTABLE HEADACHE, UNSPECIFIED CHRONICITY PATTERN, UNSPECIFIED HEADACHE TYPE: ICD-10-CM

## 2018-06-11 DIAGNOSIS — R26.89 BALANCE PROBLEM: ICD-10-CM

## 2018-06-11 RX ORDER — MECLIZINE HCL 12.5 MG/1
12.5 TABLET ORAL 3 TIMES DAILY PRN
Qty: 12 TABLET | Refills: 0 | Status: SHIPPED | OUTPATIENT
Start: 2018-06-11 | End: 2019-07-25

## 2018-06-11 RX ORDER — ROSUVASTATIN CALCIUM 20 MG/1
20 TABLET, COATED ORAL DAILY
Qty: 30 TABLET | Refills: 3 | Status: SHIPPED | OUTPATIENT
Start: 2018-06-11 | End: 2018-09-05 | Stop reason: SDUPTHER

## 2018-06-18 ENCOUNTER — TELEPHONE (OUTPATIENT)
Dept: INTERNAL MEDICINE CLINIC | Age: 68
End: 2018-06-18

## 2018-06-19 ENCOUNTER — OFFICE VISIT (OUTPATIENT)
Dept: INTERNAL MEDICINE CLINIC | Age: 68
End: 2018-06-19

## 2018-06-19 VITALS
HEIGHT: 66 IN | DIASTOLIC BLOOD PRESSURE: 60 MMHG | HEART RATE: 64 BPM | BODY MASS INDEX: 38.09 KG/M2 | OXYGEN SATURATION: 98 % | WEIGHT: 237 LBS | SYSTOLIC BLOOD PRESSURE: 132 MMHG

## 2018-06-19 DIAGNOSIS — I67.9 CEREBROVASCULAR SMALL VESSEL DISEASE: ICD-10-CM

## 2018-06-19 DIAGNOSIS — R42 DIZZINESS: Primary | ICD-10-CM

## 2018-06-19 DIAGNOSIS — D64.9 ANEMIA, UNSPECIFIED TYPE: ICD-10-CM

## 2018-06-19 DIAGNOSIS — E55.9 VITAMIN D DEFICIENCY: ICD-10-CM

## 2018-06-19 PROCEDURE — 3017F COLORECTAL CA SCREEN DOC REV: CPT | Performed by: INTERNAL MEDICINE

## 2018-06-19 PROCEDURE — G8427 DOCREV CUR MEDS BY ELIG CLIN: HCPCS | Performed by: INTERNAL MEDICINE

## 2018-06-19 PROCEDURE — 1036F TOBACCO NON-USER: CPT | Performed by: INTERNAL MEDICINE

## 2018-06-19 PROCEDURE — 1090F PRES/ABSN URINE INCON ASSESS: CPT | Performed by: INTERNAL MEDICINE

## 2018-06-19 PROCEDURE — 99214 OFFICE O/P EST MOD 30 MIN: CPT | Performed by: INTERNAL MEDICINE

## 2018-06-19 PROCEDURE — 1123F ACP DISCUSS/DSCN MKR DOCD: CPT | Performed by: INTERNAL MEDICINE

## 2018-06-19 PROCEDURE — G8399 PT W/DXA RESULTS DOCUMENT: HCPCS | Performed by: INTERNAL MEDICINE

## 2018-06-19 PROCEDURE — G8417 CALC BMI ABV UP PARAM F/U: HCPCS | Performed by: INTERNAL MEDICINE

## 2018-06-19 PROCEDURE — 4040F PNEUMOC VAC/ADMIN/RCVD: CPT | Performed by: INTERNAL MEDICINE

## 2018-06-26 ASSESSMENT — ENCOUNTER SYMPTOMS
SHORTNESS OF BREATH: 0
NAUSEA: 0
ABDOMINAL PAIN: 0
BACK PAIN: 0
VOMITING: 0

## 2018-07-03 DIAGNOSIS — E55.9 VITAMIN D DEFICIENCY: ICD-10-CM

## 2018-07-03 LAB — VITAMIN D 25-HYDROXY: 23 NG/ML

## 2018-07-05 ENCOUNTER — TELEPHONE (OUTPATIENT)
Dept: INTERNAL MEDICINE CLINIC | Age: 68
End: 2018-07-05

## 2018-07-05 NOTE — TELEPHONE ENCOUNTER
Pt stated that she seen a Hemoglobin specialist and needs someone to help explain the results to her.   she stated that she received them but does not understand them,also  needs her Vit D results 7/3/2018       Please call the patient at   [phone]

## 2018-07-06 NOTE — TELEPHONE ENCOUNTER
Call patient. Her vitamin D done on 7/3/18 is 23.0 and normal is greater than 30. Her vitamin D has significantly improved since 5/15/18. She should continue vitamin D 50,000 international units once a week. She should have refills at her pharmacy. She should call the Hematologist to explain the results of the test the Hematologist ordered. I do not have those test results at this time since I did not order them.

## 2018-07-18 ENCOUNTER — OFFICE VISIT (OUTPATIENT)
Dept: ENT CLINIC | Age: 68
End: 2018-07-18

## 2018-07-18 VITALS
WEIGHT: 231.4 LBS | SYSTOLIC BLOOD PRESSURE: 123 MMHG | TEMPERATURE: 97.5 F | DIASTOLIC BLOOD PRESSURE: 63 MMHG | HEART RATE: 64 BPM | BODY MASS INDEX: 36.32 KG/M2 | HEIGHT: 67 IN

## 2018-07-18 DIAGNOSIS — R09.89 GLOBUS PHARYNGEUS: ICD-10-CM

## 2018-07-18 DIAGNOSIS — K21.9 LARYNGOPHARYNGEAL REFLUX (LPR): ICD-10-CM

## 2018-07-18 PROCEDURE — G8427 DOCREV CUR MEDS BY ELIG CLIN: HCPCS | Performed by: OTOLARYNGOLOGY

## 2018-07-18 PROCEDURE — 1123F ACP DISCUSS/DSCN MKR DOCD: CPT | Performed by: OTOLARYNGOLOGY

## 2018-07-18 PROCEDURE — 1036F TOBACCO NON-USER: CPT | Performed by: OTOLARYNGOLOGY

## 2018-07-18 PROCEDURE — G8399 PT W/DXA RESULTS DOCUMENT: HCPCS | Performed by: OTOLARYNGOLOGY

## 2018-07-18 PROCEDURE — 4040F PNEUMOC VAC/ADMIN/RCVD: CPT | Performed by: OTOLARYNGOLOGY

## 2018-07-18 PROCEDURE — G8417 CALC BMI ABV UP PARAM F/U: HCPCS | Performed by: OTOLARYNGOLOGY

## 2018-07-18 PROCEDURE — 1101F PT FALLS ASSESS-DOCD LE1/YR: CPT | Performed by: OTOLARYNGOLOGY

## 2018-07-18 PROCEDURE — 1090F PRES/ABSN URINE INCON ASSESS: CPT | Performed by: OTOLARYNGOLOGY

## 2018-07-18 PROCEDURE — 99203 OFFICE O/P NEW LOW 30 MIN: CPT | Performed by: OTOLARYNGOLOGY

## 2018-07-18 PROCEDURE — 3017F COLORECTAL CA SCREEN DOC REV: CPT | Performed by: OTOLARYNGOLOGY

## 2018-07-18 RX ORDER — OMEPRAZOLE 40 MG/1
40 CAPSULE, DELAYED RELEASE ORAL DAILY
Qty: 30 CAPSULE | Refills: 3 | Status: SHIPPED | OUTPATIENT
Start: 2018-07-18 | End: 2018-07-18 | Stop reason: SDUPTHER

## 2018-07-18 RX ORDER — OMEPRAZOLE 40 MG/1
CAPSULE, DELAYED RELEASE ORAL
Qty: 90 CAPSULE | Refills: 3 | Status: SHIPPED | OUTPATIENT
Start: 2018-07-18 | End: 2019-08-22

## 2018-07-18 NOTE — PROGRESS NOTES
CHIEF COMPLAINT: Mucus in her throat    HISTORY OF PRESENT ILLNESS:  76 y.o. female who presents with years of phlegm in her throat. Has cough, gags. Not much throat clearing. Has globus at level of larynx. Also had dizziness. 2 months duration. True vertigo. Aggravated by looking up. Not aggravated by lying in bed. Attacks last 3 minutes. No nausea. No hearing changes in either ear. No ear fullness. Has fluttering noise in the right ear twice. Lasted a few minutes. Gets vertigo once a week.      PAST MEDICAL HISTORY:   History   Smoking Status    Never Smoker   Smokeless Tobacco    Never Used                                                    History   Alcohol Use No                                                    Current Outpatient Prescriptions:     rosuvastatin (CRESTOR) 20 MG tablet, Take 1 tablet by mouth daily Dispense generic, Disp: 30 tablet, Rfl: 3    meclizine (ANTIVERT) 12.5 MG tablet, Take 1 tablet by mouth 3 times daily as needed for Dizziness, Disp: 12 tablet, Rfl: 0    losartan-hydrochlorothiazide (HYZAAR) 100-25 MG per tablet, TAKE 1 TABLET BY MOUTH DAILY, Disp: 90 tablet, Rfl: 1    olopatadine (PATADAY) 0.2 % SOLN ophthalmic solution, INSTILL 1 DROP IN BOTH EYES DAILY, Disp: 2.5 mL, Rfl: 2    ACCU-CHEK ANJU PLUS strip, TEST BLOOD SUGAR THREE TIMES DAILY, Disp: 300 strip, Rfl: 2    vitamin D (ERGOCALCIFEROL) 07333 units CAPS capsule, Take 1 capsule by mouth once a week, Disp: 4 capsule, Rfl: 5    COMBIGAN 0.2-0.5 % ophthalmic solution, INSTILL 1 DROP INTO OU TID, Disp: , Rfl: 6    olopatadine (PATADAY) 0.2 % SOLN ophthalmic solution, INSTILL 1 DROP IN BOTH EYES DAILY, Disp: 2.5 mL, Rfl: 2    insulin glargine (LANTUS) 100 UNIT/ML injection vial, INJECT 60 UNITS UNDER THE SKIN EVERY MORNING AND 60 UNITS EVERY NIGHT AT BEDTIME, Disp: 40 mL, Rfl: 3    insulin lispro (HUMALOG KWIKPEN) 100 UNIT/ML pen, Inject 4 Units into the skin 3 times daily (before meals), Disp: 2 pen, Rfl: 3    Dextromethorphan-Guaifenesin (MUCINEX DM)  MG TB12, Take 1 tablet by mouth 2 times daily, Disp: 20 tablet, Rfl: 0    hydrocortisone 2.5 % cream, APPLY EXTERNALLY TO THE AFFECTED AREA TWICE DAILY, Disp: 30 g, Rfl: 0    LANTUS 100 UNIT/ML injection vial, ADMINISTER 60 UNITS UNDER THE SKIN EVERY MORNING AND EVERY NIGHT AT BEDTIME, Disp: 40 mL, Rfl: 2    loratadine (CLARITIN) 10 MG tablet, Take 1 tablet by mouth daily, Disp: 30 tablet, Rfl: 0    triamcinolone (NASACORT ALLERGY 24HR) 55 MCG/ACT nasal inhaler, 2 sprays by Nasal route daily, Disp: 1 Inhaler, Rfl: 3    Blood Glucose Monitoring Suppl (ACCU-CHEK ANJU PLUS) W/DEVICE KIT, Use to monitor blood sugars, Disp: 1 kit, Rfl: 0    Insulin Syringe-Needle U-100 (INSULIN SYRINGE 1CC/31GX5/16\") 31G X 5/16\" 1 ML MISC, Use with Lantus insulin twice daily, Disp: 200 each, Rfl: 3    ibuprofen (ADVIL;MOTRIN) 800 MG tablet, Take 1 tablet by mouth 3 times daily as needed for Pain, Disp: 60 tablet, Rfl: 0    vitamin D (ERGOCALCIFEROL) 56177 UNITS CAPS capsule, TAKE 1 CAPSULE BY MOUTH EVERY WEEK, Disp: 4 capsule, Rfl: 3    Compression Stockings MISC, by Does not apply route Dispense 1 pair of compression stockings 15-20mmHg, Disp: 1 each, Rfl: 0    aspirin 81 MG EC tablet, Take 81 mg by mouth daily. , Disp: , Rfl:                                                  Past Medical History:   Diagnosis Date    Dizziness     Hearing loss     Hyperlipidemia     Hypertension     Tinnitus     Type II or unspecified type diabetes mellitus without mention of complication, not stated as uncontrolled                                                     Past Surgical History:   Procedure Laterality Date     SECTION      x 1     TONSILLECTOMY           REVIEW OF SYSTEMS:  All pertinent positive and negative review of systems included in HPI. Otherwise, all systems are reviewed and negative.     PHYSICAL EXAMINATION:   GENERAL: [id number]- no acute distress  COMMUNICATION :  Normal voice  MENTAL STATUS:  Normal  HEAD AND FACE:  Normal  EXTERNAL EARS AND NOSE:  Normal  FACIAL MUSCLES:  Normal  EXTRAOCULAR MUSCLES: Intact  FACE PALPATION:  Negative  OTOSCOPY:  Normal tympanic membranes and middle ear spaces  TUNING FORKS: Rinne ++ Nicole midline at 512 Hz  INTRANASAL:  Septum midline, turbinates normal, meati clear. LIPS, TEETH, GINGIVA:  Normal mucosa  PHARYNX:  Normal  NECK:  No masses or adenopathy  SALIVARY GLANDS:  Normal  THYROID:  Normal    IMPRESSION: Suspect extra esophageal reflux disease    PLAN: Trial of omeprazole 40 with evening meal.    FOLLOW-UP: One month.

## 2018-07-24 RX ORDER — INSULIN GLARGINE 100 [IU]/ML
INJECTION, SOLUTION SUBCUTANEOUS
Qty: 40 ML | Refills: 3 | Status: SHIPPED | OUTPATIENT
Start: 2018-07-24 | End: 2018-11-27 | Stop reason: SDUPTHER

## 2018-08-08 RX ORDER — INSULIN LISPRO 100 [IU]/ML
INJECTION, SOLUTION INTRAVENOUS; SUBCUTANEOUS
Qty: 15 ML | Refills: 2 | Status: SHIPPED | OUTPATIENT
Start: 2018-08-08 | End: 2019-05-02 | Stop reason: SDUPTHER

## 2018-08-09 ENCOUNTER — OFFICE VISIT (OUTPATIENT)
Dept: ENDOCRINOLOGY | Age: 68
End: 2018-08-09

## 2018-08-09 VITALS
SYSTOLIC BLOOD PRESSURE: 110 MMHG | HEART RATE: 72 BPM | DIASTOLIC BLOOD PRESSURE: 62 MMHG | OXYGEN SATURATION: 98 % | BODY MASS INDEX: 37.48 KG/M2 | HEIGHT: 66 IN | WEIGHT: 233.2 LBS | RESPIRATION RATE: 16 BRPM

## 2018-08-09 DIAGNOSIS — I10 ESSENTIAL HYPERTENSION: ICD-10-CM

## 2018-08-09 DIAGNOSIS — E78.2 MIXED HYPERLIPIDEMIA: ICD-10-CM

## 2018-08-09 LAB — HBA1C MFR BLD: 9 %

## 2018-08-09 PROCEDURE — 1090F PRES/ABSN URINE INCON ASSESS: CPT | Performed by: INTERNAL MEDICINE

## 2018-08-09 PROCEDURE — 1101F PT FALLS ASSESS-DOCD LE1/YR: CPT | Performed by: INTERNAL MEDICINE

## 2018-08-09 PROCEDURE — G8427 DOCREV CUR MEDS BY ELIG CLIN: HCPCS | Performed by: INTERNAL MEDICINE

## 2018-08-09 PROCEDURE — G8417 CALC BMI ABV UP PARAM F/U: HCPCS | Performed by: INTERNAL MEDICINE

## 2018-08-09 PROCEDURE — 2022F DILAT RTA XM EVC RTNOPTHY: CPT | Performed by: INTERNAL MEDICINE

## 2018-08-09 PROCEDURE — 3017F COLORECTAL CA SCREEN DOC REV: CPT | Performed by: INTERNAL MEDICINE

## 2018-08-09 PROCEDURE — 83036 HEMOGLOBIN GLYCOSYLATED A1C: CPT | Performed by: INTERNAL MEDICINE

## 2018-08-09 PROCEDURE — G8399 PT W/DXA RESULTS DOCUMENT: HCPCS | Performed by: INTERNAL MEDICINE

## 2018-08-09 PROCEDURE — 4040F PNEUMOC VAC/ADMIN/RCVD: CPT | Performed by: INTERNAL MEDICINE

## 2018-08-09 PROCEDURE — 1036F TOBACCO NON-USER: CPT | Performed by: INTERNAL MEDICINE

## 2018-08-09 PROCEDURE — 3045F PR MOST RECENT HEMOGLOBIN A1C LEVEL 7.0-9.0%: CPT | Performed by: INTERNAL MEDICINE

## 2018-08-09 PROCEDURE — 99204 OFFICE O/P NEW MOD 45 MIN: CPT | Performed by: INTERNAL MEDICINE

## 2018-08-09 PROCEDURE — 1123F ACP DISCUSS/DSCN MKR DOCD: CPT | Performed by: INTERNAL MEDICINE

## 2018-08-09 RX ORDER — FLASH GLUCOSE SENSOR
KIT MISCELLANEOUS
Qty: 3 EACH | Refills: 2 | Status: SHIPPED | OUTPATIENT
Start: 2018-08-09 | End: 2018-11-12

## 2018-08-09 RX ORDER — FLASH GLUCOSE SENSOR
KIT MISCELLANEOUS
Qty: 1 DEVICE | Refills: 0 | Status: SHIPPED | OUTPATIENT
Start: 2018-08-09 | End: 2018-11-12

## 2018-08-09 ASSESSMENT — ENCOUNTER SYMPTOMS
HEARTBURN: 0
BACK PAIN: 0
CONSTIPATION: 0
ABDOMINAL PAIN: 0
HEMOPTYSIS: 0
PHOTOPHOBIA: 0
COUGH: 0
ORTHOPNEA: 0
BLURRED VISION: 0
DIARRHEA: 0
NAUSEA: 0
DOUBLE VISION: 0

## 2018-08-09 NOTE — PROGRESS NOTES
distress. HENT:   Mouth/Throat: Oropharynx is clear and moist.   Eyes: EOM are normal.   Neck: No thyromegaly present. Cardiovascular: Normal rate and normal heart sounds. Pulmonary/Chest: Effort normal. No respiratory distress. She has no wheezes. Abdominal: Soft. Bowel sounds are normal. There is no tenderness. Musculoskeletal: She exhibits no edema. Neurological: She is alert and oriented to person, place, and time. Skin: Skin is warm and dry. She is not diaphoretic. Psychiatric: Her behavior is normal. Thought content normal.         Office Visit on 08/09/2018   Component Date Value Ref Range Status    Hemoglobin A1C 08/09/2018 9.0  % Final   Orders Only on 07/03/2018   Component Date Value Ref Range Status    Vit D, 25-Hydroxy 07/03/2018 23.0* >=30 ng/mL Final    Comment: <=20 ng/mL. ........... [facility] Deficient  21-29 ng/mL. ......... [facility] Insufficient  >=30 ng/mL. ........ [facility] Sufficient     Orders Only on 05/23/2018   Component Date Value Ref Range Status    Iron 05/23/2018 64  37 - 145 ug/dL Final    TIBC 05/23/2018 276  260 - 445 ug/dL Final    Iron Saturation 05/23/2018 23  15 - 50 % Final    Ferritin 05/23/2018 101.8  15.0 - 150.0 ng/mL Final   Orders Only on 05/15/2018   Component Date Value Ref Range Status    Sodium 05/15/2018 141  136 - 145 mmol/L Final    Potassium 05/15/2018 4.1  3.5 - 5.1 mmol/L Final    Chloride 05/15/2018 101  99 - 110 mmol/L Final    CO2 05/15/2018 27  21 - 32 mmol/L Final    Anion Gap 05/15/2018 13  3 - 16 Final    Glucose 05/15/2018 77  70 - 99 mg/dL Final    BUN 05/15/2018 30* 7 - 20 mg/dL Final    CREATININE 05/15/2018 1.0  0.6 - 1.2 mg/dL Final    GFR Non- 05/15/2018 55* >60 Final    Comment: >60 mL/min/1.73m2 EGFR, calc. for ages 25 and older using the  MDRD formula (not corrected for weight), is valid for stable  renal function.       GFR  05/15/2018 >60  >60 Final    Comment: Chronic Kidney Disease: less than 60 ml/min/1.73 sq.m.          Kidney Failure: less than 15 ml/min/1.73 sq.m. Results valid for patients 18 years and older.  Calcium 05/15/2018 8.7  8.3 - 10.6 mg/dL Final    Total Protein 05/15/2018 6.5  6.4 - 8.2 g/dL Final    Alb 05/15/2018 3.8  3.4 - 5.0 g/dL Final    Albumin/Globulin Ratio 05/15/2018 1.4  1.1 - 2.2 Final    Total Bilirubin 05/15/2018 0.3  0.0 - 1.0 mg/dL Final    Alkaline Phosphatase 05/15/2018 85  40 - 129 U/L Final    ALT 05/15/2018 13  10 - 40 U/L Final    AST 05/15/2018 17  15 - 37 U/L Final    Globulin 05/15/2018 2.7  g/dL Final    Cholesterol, Total 05/15/2018 115  0 - 199 mg/dL Final    Triglycerides 05/15/2018 108  0 - 150 mg/dL Final    HDL 05/15/2018 34* 40 - 60 mg/dL Final    LDL Calculated 05/15/2018 59  <100 mg/dL Final    VLDL Cholesterol Calculated 05/15/2018 22  Not Established mg/dL Final    Vit D, 25-Hydroxy 05/15/2018 10.5* >=30 ng/mL Final    Comment: <=20 ng/mL. ........... [employer] Deficient  21-29 ng/mL. ......... [employer] Insufficient  >=30 ng/mL. ........ [employer] [facility] Sufficient      WBC 05/15/2018 9.0  4.0 - 11.0 K/uL Final    RBC 05/15/2018 3.56* 4.00 - 5.20 M/uL Final    Hemoglobin 05/15/2018 10.9* 12.0 - 16.0 g/dL Final    Hematocrit 05/15/2018 31.1* 36.0 - 48.0 % Final    MCV 05/15/2018 87.6  80.0 - 100.0 fL Final    MCH 05/15/2018 30.6  26.0 - 34.0 pg Final    MCHC 05/15/2018 34.9  31.0 - 36.0 g/dL Final    RDW 05/15/2018 13.4  12.4 - 15.4 % Final    Platelets 70/01/8693 364  135 - 450 K/uL Final    MPV 05/15/2018 6.9  5.0 - 10.5 fL Final    Neutrophils % 05/15/2018 58.4  % Final    Lymphocytes % 05/15/2018 32.5  % Final    Monocytes % 05/15/2018 5.8  % Final    Eosinophils % 05/15/2018 2.7  % Final    Basophils % 05/15/2018 0.6  % Final    Neutrophils # 05/15/2018 5.2  1.7 - 7.7 K/uL Final    Lymphocytes # 05/15/2018 2.9  1.0 - 5.1 K/uL Final    Monocytes # 05/15/2018 0.5  0.0 - 1.3 K/uL Final    Eosinophils # 05/15/2018 0.2  0.0 - 0.6 K/uL Final    Basophils # 05/15/2018

## 2018-08-22 ENCOUNTER — TELEPHONE (OUTPATIENT)
Dept: ENDOCRINOLOGY | Age: 68
End: 2018-08-22

## 2018-08-23 NOTE — TELEPHONE ENCOUNTER
Called patient and let her know that I called the number and was told as before that patient has to go through [address] [place], [place] [address]daniel Se, advised her to call her insurance and ask who they will cover and then contact them to get the process started.  She verbalized understanding

## 2018-08-24 RX ORDER — CALCIUM CARB/VITAMIN D3/VIT K1 500-100-40
TABLET,CHEWABLE ORAL
Qty: 200 EACH | Refills: 2 | Status: SHIPPED | OUTPATIENT
Start: 2018-08-24 | End: 2019-08-05 | Stop reason: SDUPTHER

## 2018-08-24 NOTE — TELEPHONE ENCOUNTER
Medication:   Requested Prescriptions     Pending Prescriptions Disp Refills    Insulin Syringe-Needle U-100 (INSULIN SYRINGE 1CC/31GX5/16\") 31G X 5/16\" 1 ML MISC [Pharmacy Med Name: Lee Arguello SYR/NDL 31G 1ML5/16 (8MM)]  0     Sig: USE AS DIRECTED TWICE DAILY       Last Filled:      Patient Phone Number: [phone] (home)     Last appt: 6/19/2018   Next appt: 8/27/2018    Last Labs DM:   Lab Results   Component Value Date    LABA1C 9.0 08/09/2018       Last OARRS: No flowsheet data found.     Preferred Pharmacy:   [facility] [address] [place] [id number]  Phone: [phone] Fax: [phone]

## 2018-08-27 ENCOUNTER — OFFICE VISIT (OUTPATIENT)
Dept: INTERNAL MEDICINE CLINIC | Age: 68
End: 2018-08-27

## 2018-08-27 ENCOUNTER — TELEPHONE (OUTPATIENT)
Dept: INTERNAL MEDICINE CLINIC | Age: 68
End: 2018-08-27

## 2018-08-27 VITALS
DIASTOLIC BLOOD PRESSURE: 60 MMHG | BODY MASS INDEX: 37.38 KG/M2 | SYSTOLIC BLOOD PRESSURE: 110 MMHG | HEART RATE: 69 BPM | OXYGEN SATURATION: 92 % | HEIGHT: 66 IN | WEIGHT: 232.6 LBS

## 2018-08-27 DIAGNOSIS — K25.9 PREPYLORIC ULCER, UNSPECIFIED ULCER CHRONICITY: ICD-10-CM

## 2018-08-27 DIAGNOSIS — E55.9 VITAMIN D DEFICIENCY: ICD-10-CM

## 2018-08-27 DIAGNOSIS — E78.2 MIXED HYPERLIPIDEMIA: ICD-10-CM

## 2018-08-27 DIAGNOSIS — R53.83 FATIGUE, UNSPECIFIED TYPE: ICD-10-CM

## 2018-08-27 DIAGNOSIS — I10 ESSENTIAL HYPERTENSION: ICD-10-CM

## 2018-08-27 DIAGNOSIS — L30.9 DERMATITIS: Primary | ICD-10-CM

## 2018-08-27 DIAGNOSIS — I10 ESSENTIAL HYPERTENSION: Primary | ICD-10-CM

## 2018-08-27 LAB
A/G RATIO: 1.3 (ref 1.1–2.2)
ALBUMIN SERPL-MCNC: 3.7 G/DL (ref 3.4–5)
ALP BLD-CCNC: 98 U/L (ref 40–129)
ALT SERPL-CCNC: 14 U/L (ref 10–40)
ANION GAP SERPL CALCULATED.3IONS-SCNC: 12 MMOL/L (ref 3–16)
AST SERPL-CCNC: 18 U/L (ref 15–37)
BASOPHILS ABSOLUTE: 0.1 K/UL (ref 0–0.2)
BASOPHILS RELATIVE PERCENT: 0.7 %
BILIRUB SERPL-MCNC: 0.3 MG/DL (ref 0–1)
BUN BLDV-MCNC: 24 MG/DL (ref 7–20)
CALCIUM SERPL-MCNC: 9.1 MG/DL (ref 8.3–10.6)
CHLORIDE BLD-SCNC: 101 MMOL/L (ref 99–110)
CO2: 30 MMOL/L (ref 21–32)
CREAT SERPL-MCNC: 1.1 MG/DL (ref 0.6–1.2)
EOSINOPHILS ABSOLUTE: 0.2 K/UL (ref 0–0.6)
EOSINOPHILS RELATIVE PERCENT: 2 %
GFR AFRICAN AMERICAN: 60
GFR NON-AFRICAN AMERICAN: 49
GLOBULIN: 2.8 G/DL
GLUCOSE BLD-MCNC: 119 MG/DL (ref 70–99)
HCT VFR BLD CALC: 30.3 % (ref 36–48)
HEMOGLOBIN: 10.3 G/DL (ref 12–16)
LYMPHOCYTES ABSOLUTE: 3 K/UL (ref 1–5.1)
LYMPHOCYTES RELATIVE PERCENT: 29.6 %
MCH RBC QN AUTO: 30.4 PG (ref 26–34)
MCHC RBC AUTO-ENTMCNC: 34.1 G/DL (ref 31–36)
MCV RBC AUTO: 89.2 FL (ref 80–100)
MONOCYTES ABSOLUTE: 0.5 K/UL (ref 0–1.3)
MONOCYTES RELATIVE PERCENT: 4.5 %
NEUTROPHILS ABSOLUTE: 6.4 K/UL (ref 1.7–7.7)
NEUTROPHILS RELATIVE PERCENT: 63.2 %
PDW BLD-RTO: 13 % (ref 12.4–15.4)
PLATELET # BLD: 358 K/UL (ref 135–450)
PMV BLD AUTO: 6.6 FL (ref 5–10.5)
POTASSIUM SERPL-SCNC: 4 MMOL/L (ref 3.5–5.1)
RBC # BLD: 3.4 M/UL (ref 4–5.2)
SODIUM BLD-SCNC: 143 MMOL/L (ref 136–145)
TOTAL PROTEIN: 6.5 G/DL (ref 6.4–8.2)
TSH SERPL DL<=0.05 MIU/L-ACNC: 1.01 UIU/ML (ref 0.27–4.2)
VITAMIN B-12: 414 PG/ML (ref 211–911)
VITAMIN D 25-HYDROXY: 24.7 NG/ML
WBC # BLD: 10.1 K/UL (ref 4–11)

## 2018-08-27 PROCEDURE — 1123F ACP DISCUSS/DSCN MKR DOCD: CPT | Performed by: INTERNAL MEDICINE

## 2018-08-27 PROCEDURE — 1101F PT FALLS ASSESS-DOCD LE1/YR: CPT | Performed by: INTERNAL MEDICINE

## 2018-08-27 PROCEDURE — G8427 DOCREV CUR MEDS BY ELIG CLIN: HCPCS | Performed by: INTERNAL MEDICINE

## 2018-08-27 PROCEDURE — 1036F TOBACCO NON-USER: CPT | Performed by: INTERNAL MEDICINE

## 2018-08-27 PROCEDURE — 3017F COLORECTAL CA SCREEN DOC REV: CPT | Performed by: INTERNAL MEDICINE

## 2018-08-27 PROCEDURE — G8399 PT W/DXA RESULTS DOCUMENT: HCPCS | Performed by: INTERNAL MEDICINE

## 2018-08-27 PROCEDURE — 1090F PRES/ABSN URINE INCON ASSESS: CPT | Performed by: INTERNAL MEDICINE

## 2018-08-27 PROCEDURE — 99214 OFFICE O/P EST MOD 30 MIN: CPT | Performed by: INTERNAL MEDICINE

## 2018-08-27 PROCEDURE — 4040F PNEUMOC VAC/ADMIN/RCVD: CPT | Performed by: INTERNAL MEDICINE

## 2018-08-27 PROCEDURE — G8417 CALC BMI ABV UP PARAM F/U: HCPCS | Performed by: INTERNAL MEDICINE

## 2018-08-27 NOTE — PROGRESS NOTES
(INSULIN SYRINGE 1CC/31GX5/16\") 31G X 5/16\" 1 ML MISC USE AS DIRECTED TWICE DAILY 200 each 2    OMEGA-3 FATTY ACIDS PO Omega-3 Fatty Acids Oral orallyActive      Continuous Blood Gluc  (FREESTYLE DAINA READER) KAMILA Use to test sugars 3 times a day 1 Device 0    Continuous Blood Gluc Sensor (FREESTYLE DAINA SENSOR SYSTEM) MISC Use to test sugars 3 times a day 3 each 2    Dulaglutide (TRULICITY) 8.84 PM/4.0VB SOPN Inject 0.75 mg into the skin once a week 4 pen 2    HUMALOG KWIKPEN 100 UNIT/ML pen INJECT 4 UNITS INTO THE SKIN FOUR TIMES DAILY 15 mL 2    LANTUS 100 UNIT/ML injection vial ADMINISTER 60 UNITS UNDER THE SKIN EVERY MORNING AND EVERY NIGHT AT BEDTIME 40 mL 3    omeprazole (PRILOSEC) 40 MG delayed release capsule TAKE ONE CAPSULE BY MOUTH DAILY ONE HOUR BEFORE EVENING MEAL 90 capsule 3    rosuvastatin (CRESTOR) 20 MG tablet Take 1 tablet by mouth daily Dispense generic 30 tablet 3    meclizine (ANTIVERT) 12.5 MG tablet Take 1 tablet by mouth 3 times daily as needed for Dizziness 12 tablet 0    losartan-hydrochlorothiazide (HYZAAR) 100-25 MG per tablet TAKE 1 TABLET BY MOUTH DAILY 90 tablet 1    olopatadine (PATADAY) 0.2 % SOLN ophthalmic solution INSTILL 1 DROP IN BOTH EYES DAILY 2.5 mL 2    ACCU-CHEK ANJU PLUS strip TEST BLOOD SUGAR THREE TIMES DAILY 300 strip 2    COMBIGAN 0.2-0.5 % ophthalmic solution INSTILL 1 DROP INTO OU TID  6    hydrocortisone 2.5 % cream APPLY EXTERNALLY TO THE AFFECTED AREA TWICE DAILY 30 g 0    loratadine (CLARITIN) 10 MG tablet Take 1 tablet by mouth daily 30 tablet 0    triamcinolone (NASACORT ALLERGY 24HR) 55 MCG/ACT nasal inhaler 2 sprays by Nasal route daily 1 Inhaler 3    Blood Glucose Monitoring Suppl (ACCU-CHEK ANJU PLUS) W/DEVICE KIT Use to monitor blood sugars 1 kit 0    ibuprofen (ADVIL;MOTRIN) 800 MG tablet Take 1 tablet by mouth 3 times daily as needed for Pain 60 tablet 0    vitamin D (ERGOCALCIFEROL) 04915 UNITS CAPS capsule TAKE 1 CAPSULE BY MOUTH EVERY WEEK 4 capsule 3    Compression Stockings MISC by Does not apply route Dispense 1 pair of compression stockings 15-20mmHg 1 each 0         Vitals:    08/27/18 1128   BP: 110/60   Site: Left Arm   Position: Sitting   Cuff Size: Large Adult   Pulse: 69   SpO2: 92%   Weight: 232 lb 9.6 oz (105.5 kg)   Height: 5' 6\" (1.676 m)     Body mass index is 37.54 kg/m². Physical Exam   Constitutional: She is oriented to person, place, and time. She appears well-developed and well-nourished. No distress. HENT:   Mouth/Throat: Oropharynx is clear and moist and mucous membranes are normal.   Eyes: Pupils are equal, round, and reactive to light. Conjunctivae, EOM and lids are normal.   Neck: Neck supple. Carotid bruit is not present. No thyromegaly present. Cardiovascular: Normal rate, regular rhythm, S1 normal, S2 normal and normal heart sounds. Exam reveals no gallop and no friction rub. No murmur heard. Pulmonary/Chest: Effort normal. She has no wheezes. She has no rhonchi. She has no rales. Abdominal: Soft. Normal appearance and bowel sounds are normal. She exhibits no distension. There is no hepatosplenomegaly. There is no tenderness. Lymphadenopathy:        Head (right side): No submandibular adenopathy present. Head (left side): No submandibular adenopathy present. Neurological: She is alert and oriented to person, place, and time. Psychiatric: She has a normal mood and affect. Nursing note reviewed. No results found for this visit on 08/27/18. Lab Review   Office Visit on 08/09/2018   Component Date Value    Hemoglobin A1C 08/09/2018 9.0    Orders Only on 07/03/2018   Component Date Value    Vit D, 25-Hydroxy 07/03/2018 23.0*       Assessment/Plan     1. Essential hypertension  - stable  - Comprehensive Metabolic Panel; Future    2.  Mixed hyperlipidemia  -Continue same medications  -Low fat, low cholesterol diet  -Regular aerobic exercise  - Comprehensive

## 2018-08-27 NOTE — PATIENT INSTRUCTIONS
-Continue same medications  -Low sodium diet  -Low fat, low cholesterol diet  -multivitamin once daily  -Regular aerobic exercise  -Avoid aspirin and NSAID's such as otc Ibuprofen, Advil, Motrin, Naprosyn, and Aleve as well as prescription NSAID's due to ulcer

## 2018-08-27 NOTE — TELEPHONE ENCOUNTER
Patient needs a hydrocortisone cream called to [facility] [phone] said this was discussed at visit today

## 2018-08-30 ASSESSMENT — ENCOUNTER SYMPTOMS
ABDOMINAL PAIN: 0
BLOOD IN STOOL: 0
COUGH: 0
RHINORRHEA: 0
NAUSEA: 0
SHORTNESS OF BREATH: 0
DIARRHEA: 0
SORE THROAT: 0
CONSTIPATION: 0
WHEEZING: 0
VOMITING: 0
CHEST TIGHTNESS: 0
SINUS PRESSURE: 0

## 2018-09-05 DIAGNOSIS — E78.2 MIXED HYPERLIPIDEMIA: ICD-10-CM

## 2018-09-05 RX ORDER — ROSUVASTATIN CALCIUM 20 MG/1
20 TABLET, COATED ORAL DAILY
Qty: 90 TABLET | Refills: 1 | Status: SHIPPED | OUTPATIENT
Start: 2018-09-05 | End: 2019-03-11 | Stop reason: SDUPTHER

## 2018-09-10 ENCOUNTER — TELEPHONE (OUTPATIENT)
Dept: INTERNAL MEDICINE CLINIC | Age: 68
End: 2018-09-10

## 2018-09-13 RX ORDER — OLOPATADINE HYDROCHLORIDE 2 MG/ML
SOLUTION/ DROPS OPHTHALMIC
Qty: 2.5 ML | Refills: 2 | Status: SHIPPED | OUTPATIENT
Start: 2018-09-13 | End: 2018-12-18

## 2018-09-17 ENCOUNTER — TELEPHONE (OUTPATIENT)
Dept: ENDOCRINOLOGY | Age: 68
End: 2018-09-17

## 2018-09-17 NOTE — TELEPHONE ENCOUNTER
Pt called asking about how to take her Trulicity as she is not sure on how to keep her Diabetes managed. Her NOV is 10/11/18 with Michael Bob and then again next month with Hema.

## 2018-09-17 NOTE — TELEPHONE ENCOUNTER
Spoke with pt and advised that she take her Trulicity 2.30 mg once a week on the same day at the same time. Pt asked what her fasting glucose range should be and per Dr. Janine Sanchez range should be between . When sugar is below 120 take 1/2 of Humalog dose and when sugars are above 120 take the normal dose of Humalog at 8 units. Also advised pt per Dr. Janine Sanchez pt is to hold humalog dose when sugars are below 80. Pt stated her understanding. Thanks!

## 2018-09-21 ENCOUNTER — TELEPHONE (OUTPATIENT)
Dept: ENDOCRINOLOGY | Age: 68
End: 2018-09-21

## 2018-10-05 ENCOUNTER — CLINICAL DOCUMENTATION (OUTPATIENT)
Dept: FAMILY MEDICINE CLINIC | Age: 68
End: 2018-10-05

## 2018-11-12 ENCOUNTER — OFFICE VISIT (OUTPATIENT)
Dept: ENDOCRINOLOGY | Age: 68
End: 2018-11-12
Payer: MEDICARE

## 2018-11-12 VITALS
RESPIRATION RATE: 14 BRPM | BODY MASS INDEX: 37.45 KG/M2 | WEIGHT: 233 LBS | OXYGEN SATURATION: 98 % | DIASTOLIC BLOOD PRESSURE: 76 MMHG | HEIGHT: 66 IN | SYSTOLIC BLOOD PRESSURE: 162 MMHG | HEART RATE: 69 BPM

## 2018-11-12 DIAGNOSIS — E78.2 MIXED HYPERLIPIDEMIA: ICD-10-CM

## 2018-11-12 DIAGNOSIS — E11.65 UNCONTROLLED TYPE 2 DIABETES MELLITUS WITH HYPERGLYCEMIA (HCC): Primary | ICD-10-CM

## 2018-11-12 DIAGNOSIS — I10 ESSENTIAL HYPERTENSION: ICD-10-CM

## 2018-11-12 LAB — HBA1C MFR BLD: 8.8 %

## 2018-11-12 PROCEDURE — G8417 CALC BMI ABV UP PARAM F/U: HCPCS | Performed by: INTERNAL MEDICINE

## 2018-11-12 PROCEDURE — 3045F PR MOST RECENT HEMOGLOBIN A1C LEVEL 7.0-9.0%: CPT | Performed by: INTERNAL MEDICINE

## 2018-11-12 PROCEDURE — 3017F COLORECTAL CA SCREEN DOC REV: CPT | Performed by: INTERNAL MEDICINE

## 2018-11-12 PROCEDURE — 99214 OFFICE O/P EST MOD 30 MIN: CPT | Performed by: INTERNAL MEDICINE

## 2018-11-12 PROCEDURE — 1101F PT FALLS ASSESS-DOCD LE1/YR: CPT | Performed by: INTERNAL MEDICINE

## 2018-11-12 PROCEDURE — 83036 HEMOGLOBIN GLYCOSYLATED A1C: CPT | Performed by: INTERNAL MEDICINE

## 2018-11-12 PROCEDURE — 4040F PNEUMOC VAC/ADMIN/RCVD: CPT | Performed by: INTERNAL MEDICINE

## 2018-11-12 PROCEDURE — 1123F ACP DISCUSS/DSCN MKR DOCD: CPT | Performed by: INTERNAL MEDICINE

## 2018-11-12 PROCEDURE — 2022F DILAT RTA XM EVC RTNOPTHY: CPT | Performed by: INTERNAL MEDICINE

## 2018-11-12 PROCEDURE — G8399 PT W/DXA RESULTS DOCUMENT: HCPCS | Performed by: INTERNAL MEDICINE

## 2018-11-12 PROCEDURE — 1036F TOBACCO NON-USER: CPT | Performed by: INTERNAL MEDICINE

## 2018-11-12 PROCEDURE — G8427 DOCREV CUR MEDS BY ELIG CLIN: HCPCS | Performed by: INTERNAL MEDICINE

## 2018-11-12 PROCEDURE — 1090F PRES/ABSN URINE INCON ASSESS: CPT | Performed by: INTERNAL MEDICINE

## 2018-11-12 PROCEDURE — G8484 FLU IMMUNIZE NO ADMIN: HCPCS | Performed by: INTERNAL MEDICINE

## 2018-11-12 ASSESSMENT — ENCOUNTER SYMPTOMS
ORTHOPNEA: 0
COUGH: 0
ABDOMINAL PAIN: 0
PHOTOPHOBIA: 0
HEMOPTYSIS: 0
HEARTBURN: 0
BACK PAIN: 0
NAUSEA: 0
DOUBLE VISION: 0
CONSTIPATION: 0
DIARRHEA: 0
BLURRED VISION: 0

## 2018-11-12 NOTE — PROGRESS NOTES
and time. She appears well-developed. No distress. HENT:   Mouth/Throat: Oropharynx is clear and moist.   Eyes: EOM are normal.   Neck: No thyromegaly present. Cardiovascular: Normal rate and normal heart sounds. Pulmonary/Chest: Effort normal. No respiratory distress. She has no wheezes. Abdominal: Soft. Bowel sounds are normal. There is no tenderness. Musculoskeletal: She exhibits no edema. Neurological: She is alert and oriented to person, place, and time. Skin: Skin is warm and dry. She is not diaphoretic.    Psychiatric: Her behavior is normal. Thought content normal.         Orders Only on 08/27/2018   Component Date Value Ref Range Status    WBC 08/27/2018 10.1  4.0 - 11.0 K/uL Final    RBC 08/27/2018 3.40* 4.00 - 5.20 M/uL Final    Hemoglobin 08/27/2018 10.3* 12.0 - 16.0 g/dL Final    Hematocrit 08/27/2018 30.3* 36.0 - 48.0 % Final    MCV 08/27/2018 89.2  80.0 - 100.0 fL Final    MCH 08/27/2018 30.4  26.0 - 34.0 pg Final    MCHC 08/27/2018 34.1  31.0 - 36.0 g/dL Final    RDW 08/27/2018 13.0  12.4 - 15.4 % Final    Platelets 15/56/4941 358  135 - 450 K/uL Final    MPV 08/27/2018 6.6  5.0 - 10.5 fL Final    Neutrophils % 08/27/2018 63.2  % Final    Lymphocytes % 08/27/2018 29.6  % Final    Monocytes % 08/27/2018 4.5  % Final    Eosinophils % 08/27/2018 2.0  % Final    Basophils % 08/27/2018 0.7  % Final    Neutrophils # 08/27/2018 6.4  1.7 - 7.7 K/uL Final    Lymphocytes # 08/27/2018 3.0  1.0 - 5.1 K/uL Final    Monocytes # 08/27/2018 0.5  0.0 - 1.3 K/uL Final    Eosinophils # 08/27/2018 0.2  0.0 - 0.6 K/uL Final    Basophils # 08/27/2018 0.1  0.0 - 0.2 K/uL Final    Sodium 08/27/2018 143  136 - 145 mmol/L Final    Potassium 08/27/2018 4.0  3.5 - 5.1 mmol/L Final    Chloride 08/27/2018 101  99 - 110 mmol/L Final    CO2 08/27/2018 30  21 - 32 mmol/L Final    Anion Gap 08/27/2018 12  3 - 16 Final    Glucose 08/27/2018 119* 70 - 99 mg/dL Final    BUN 08/27/2018 24* 7 - 20

## 2018-11-27 RX ORDER — INSULIN GLARGINE 100 [IU]/ML
INJECTION, SOLUTION SUBCUTANEOUS
Qty: 40 ML | Refills: 2 | Status: SHIPPED | OUTPATIENT
Start: 2018-11-27 | End: 2019-03-18 | Stop reason: SDUPTHER

## 2018-11-27 RX ORDER — DULAGLUTIDE 0.75 MG/.5ML
INJECTION, SOLUTION SUBCUTANEOUS
Qty: 2 ML | Refills: 3 | Status: SHIPPED | OUTPATIENT
Start: 2018-11-27 | End: 2019-03-19 | Stop reason: SDUPTHER

## 2018-11-27 NOTE — TELEPHONE ENCOUNTER
Medication:   Requested Prescriptions     Pending Prescriptions Disp Refills    LANTUS 100 UNIT/ML injection vial [Pharmacy Med Name: LANTUS U-100 INSULIN 10ML] 40 mL 0     Sig: ADMINISTER 60 UNITS UNDER THE SKIN EVERY MORNING AND EVERY NIGHT AT BEDTIME       Last Filled:      Patient Phone Number: [phone] (home)     Last appt: 8/27/2018 -2018Next appt: Visit date not found    Last Labs DM:   Lab Results   Component Value Date    LABA1C 8.8 11/12/2018       Last OARRS: No flowsheet data found.     Preferred Pharmacy:   [facility] [address] [place] [address]  Phone: [phone] Fax: [phone]

## 2018-12-10 ENCOUNTER — CLINICAL DOCUMENTATION (OUTPATIENT)
Dept: INTERNAL MEDICINE CLINIC | Age: 68
End: 2018-12-10

## 2018-12-18 ENCOUNTER — OFFICE VISIT (OUTPATIENT)
Dept: INTERNAL MEDICINE CLINIC | Age: 68
End: 2018-12-18
Payer: MEDICARE

## 2018-12-18 VITALS
WEIGHT: 233.4 LBS | OXYGEN SATURATION: 95 % | HEIGHT: 66 IN | SYSTOLIC BLOOD PRESSURE: 130 MMHG | BODY MASS INDEX: 37.51 KG/M2 | HEART RATE: 65 BPM | DIASTOLIC BLOOD PRESSURE: 60 MMHG

## 2018-12-18 DIAGNOSIS — E55.9 VITAMIN D DEFICIENCY: ICD-10-CM

## 2018-12-18 DIAGNOSIS — E11.65 UNCONTROLLED TYPE 2 DIABETES MELLITUS WITH HYPERGLYCEMIA (HCC): Primary | ICD-10-CM

## 2018-12-18 DIAGNOSIS — I10 ESSENTIAL HYPERTENSION: ICD-10-CM

## 2018-12-18 DIAGNOSIS — Z23 NEED FOR INFLUENZA VACCINATION: ICD-10-CM

## 2018-12-18 DIAGNOSIS — E78.2 MIXED HYPERLIPIDEMIA: ICD-10-CM

## 2018-12-18 PROCEDURE — 99214 OFFICE O/P EST MOD 30 MIN: CPT | Performed by: INTERNAL MEDICINE

## 2018-12-18 PROCEDURE — G8427 DOCREV CUR MEDS BY ELIG CLIN: HCPCS | Performed by: INTERNAL MEDICINE

## 2018-12-18 PROCEDURE — 1036F TOBACCO NON-USER: CPT | Performed by: INTERNAL MEDICINE

## 2018-12-18 PROCEDURE — 4040F PNEUMOC VAC/ADMIN/RCVD: CPT | Performed by: INTERNAL MEDICINE

## 2018-12-18 PROCEDURE — 3017F COLORECTAL CA SCREEN DOC REV: CPT | Performed by: INTERNAL MEDICINE

## 2018-12-18 PROCEDURE — G8399 PT W/DXA RESULTS DOCUMENT: HCPCS | Performed by: INTERNAL MEDICINE

## 2018-12-18 PROCEDURE — G0008 ADMIN INFLUENZA VIRUS VAC: HCPCS | Performed by: INTERNAL MEDICINE

## 2018-12-18 PROCEDURE — 1123F ACP DISCUSS/DSCN MKR DOCD: CPT | Performed by: INTERNAL MEDICINE

## 2018-12-18 PROCEDURE — 3045F PR MOST RECENT HEMOGLOBIN A1C LEVEL 7.0-9.0%: CPT | Performed by: INTERNAL MEDICINE

## 2018-12-18 PROCEDURE — 1101F PT FALLS ASSESS-DOCD LE1/YR: CPT | Performed by: INTERNAL MEDICINE

## 2018-12-18 PROCEDURE — G8482 FLU IMMUNIZE ORDER/ADMIN: HCPCS | Performed by: INTERNAL MEDICINE

## 2018-12-18 PROCEDURE — 90662 IIV NO PRSV INCREASED AG IM: CPT | Performed by: INTERNAL MEDICINE

## 2018-12-18 PROCEDURE — 1090F PRES/ABSN URINE INCON ASSESS: CPT | Performed by: INTERNAL MEDICINE

## 2018-12-18 PROCEDURE — G8417 CALC BMI ABV UP PARAM F/U: HCPCS | Performed by: INTERNAL MEDICINE

## 2018-12-18 PROCEDURE — 2022F DILAT RTA XM EVC RTNOPTHY: CPT | Performed by: INTERNAL MEDICINE

## 2018-12-18 ASSESSMENT — PATIENT HEALTH QUESTIONNAIRE - PHQ9
SUM OF ALL RESPONSES TO PHQ9 QUESTIONS 1 & 2: 0
SUM OF ALL RESPONSES TO PHQ QUESTIONS 1-9: 0
1. LITTLE INTEREST OR PLEASURE IN DOING THINGS: 0
SUM OF ALL RESPONSES TO PHQ QUESTIONS 1-9: 0
2. FEELING DOWN, DEPRESSED OR HOPELESS: 0

## 2018-12-18 NOTE — PROGRESS NOTES
Date Value    WBC 08/27/2018 10.1     RBC 08/27/2018 3.40*    Hemoglobin 08/27/2018 10.3*    Hematocrit 08/27/2018 30.3*    MCV 08/27/2018 89.2     MCH 08/27/2018 30.4     MCHC 08/27/2018 34.1     RDW 08/27/2018 13.0     Platelets 12/09/0670 358     MPV 08/27/2018 6.6     Neutrophils % 08/27/2018 63.2     Lymphocytes % 08/27/2018 29.6     Monocytes % 08/27/2018 4.5     Eosinophils % 08/27/2018 2.0     Basophils % 08/27/2018 0.7     Neutrophils # 08/27/2018 6.4     Lymphocytes # 08/27/2018 3.0     Monocytes # 08/27/2018 0.5     Eosinophils # 08/27/2018 0.2     Basophils # 08/27/2018 0.1     Sodium 08/27/2018 143     Potassium 08/27/2018 4.0     Chloride 08/27/2018 101     CO2 08/27/2018 30     Anion Gap 08/27/2018 12     Glucose 08/27/2018 119*    BUN 08/27/2018 24*    CREATININE 08/27/2018 1.1     GFR Non- 08/27/2018 49*    GFR  08/27/2018 60*    Calcium 08/27/2018 9.1     Total Protein 08/27/2018 6.5     Alb 08/27/2018 3.7     Albumin/Globulin Ratio 08/27/2018 1.3     Total Bilirubin 08/27/2018 0.3     Alkaline Phosphatase 08/27/2018 98     ALT 08/27/2018 14     AST 08/27/2018 18     Globulin 08/27/2018 2.8     TSH 08/27/2018 1.01     Vitamin B-12 08/27/2018 414     Vit D, 25-Hydroxy 08/27/2018 24.7*   Office Visit on 08/09/2018   Component Date Value    Hemoglobin A1C 08/09/2018 9.0    Orders Only on 07/03/2018   Component Date Value    Vit D, 25-Hydroxy 07/03/2018 23.0*         Assessment/Plan     1. Uncontrolled type 2 diabetes mellitus with hyperglycemia (HCC)   Continue same medications  -Increase Lantus insulin to 52 units twice daily  -Follow up with Endocrinology   -Limit carbohydrates to 45 grams with meals and 15 grams with snacks  - Comprehensive Metabolic Panel; Future    2. Essential hypertension  -stable  -Continue same medications  -Low sodium diet  -Regular aerobic exercise  - Comprehensive Metabolic Panel; Future    3.

## 2018-12-24 RX ORDER — LOSARTAN POTASSIUM AND HYDROCHLOROTHIAZIDE 25; 100 MG/1; MG/1
TABLET ORAL
Qty: 90 TABLET | Refills: 1 | Status: SHIPPED | OUTPATIENT
Start: 2018-12-24 | End: 2019-12-31

## 2018-12-24 NOTE — TELEPHONE ENCOUNTER
Medication:   Requested Prescriptions     Pending Prescriptions Disp Refills    losartan-hydrochlorothiazide (HYZAAR) 100-25 MG per tablet [Pharmacy Med Name: LOSARTAN/HCTZ 100/25MG TABLETS] 90 tablet 0     Sig: TAKE 1 TABLET BY MOUTH DAILY       Last Filled:  06/06/18    Patient Phone Number: [phone] (home)     Last appt: 12/18/2018   Next appt: 04/18/19    Last BMP:   Lab Results   Component Value Date     08/27/2018    K 4.0 08/27/2018     08/27/2018    CO2 30 08/27/2018    ANIONGAP 12 08/27/2018    GLUCOSE 119 08/27/2018    BUN 24 08/27/2018    CREATININE 1.1 08/27/2018    LABGLOM 49 08/27/2018    GFRAA 60 08/27/2018    GFRAA >60 04/04/2013    CALCIUM 9.1 08/27/2018      Last CMP:   Lab Results   Component Value Date     08/27/2018    K 4.0 08/27/2018     08/27/2018    CO2 30 08/27/2018    ANIONGAP 12 08/27/2018    GLUCOSE 119 08/27/2018    BUN 24 08/27/2018    CREATININE 1.1 08/27/2018    LABGLOM 49 08/27/2018    GFRAA 60 08/27/2018    GFRAA >60 04/04/2013    PROT 6.5 08/27/2018    PROT 7.1 01/23/2012    LABALBU 3.7 08/27/2018    AGRATIO 1.3 08/27/2018    BILITOT 0.3 08/27/2018    ALKPHOS 98 08/27/2018    ALT 14 08/27/2018    AST 18 08/27/2018    GLOB 2.8 08/27/2018     Last Renal Function:   Lab Results   Component Value Date     08/27/2018    K 4.0 08/27/2018     08/27/2018    CO2 30 08/27/2018    GLUCOSE 119 08/27/2018    BUN 24 08/27/2018    CREATININE 1.1 08/27/2018    LABALBU 3.7 08/27/2018    CALCIUM 9.1 08/27/2018    GFR 60 04/04/2013    GFRAA 60 08/27/2018    GFRAA >60 04/04/2013       Last OARRS: No flowsheet data found.     Preferred Pharmacy:   [facility], [address] [place], [place] [address] [address] [address]  Phone: [phone] Fax: [phone]

## 2018-12-25 ASSESSMENT — ENCOUNTER SYMPTOMS
RHINORRHEA: 0
SINUS PRESSURE: 0
WHEEZING: 0
SORE THROAT: 0
DIARRHEA: 0
ABDOMINAL PAIN: 0
SHORTNESS OF BREATH: 0
COUGH: 0
CONSTIPATION: 0
NAUSEA: 0
CHEST TIGHTNESS: 0
BLOOD IN STOOL: 0
VOMITING: 0

## 2019-01-17 ENCOUNTER — TELEPHONE (OUTPATIENT)
Dept: INTERNAL MEDICINE CLINIC | Age: 69
End: 2019-01-17

## 2019-01-18 ENCOUNTER — TELEPHONE (OUTPATIENT)
Dept: INTERNAL MEDICINE CLINIC | Age: 69
End: 2019-01-18

## 2019-01-18 DIAGNOSIS — E55.9 VITAMIN D DEFICIENCY: ICD-10-CM

## 2019-01-18 RX ORDER — ERGOCALCIFEROL 1.25 MG/1
CAPSULE ORAL
Qty: 4 CAPSULE | Refills: 5 | Status: SHIPPED | OUTPATIENT
Start: 2019-01-18 | End: 2019-08-28 | Stop reason: SDUPTHER

## 2019-03-11 DIAGNOSIS — E78.2 MIXED HYPERLIPIDEMIA: ICD-10-CM

## 2019-03-12 RX ORDER — BLOOD SUGAR DIAGNOSTIC
STRIP MISCELLANEOUS
Qty: 300 STRIP | Refills: 2 | Status: SHIPPED | OUTPATIENT
Start: 2019-03-12 | End: 2019-08-05 | Stop reason: SDUPTHER

## 2019-03-12 RX ORDER — ROSUVASTATIN CALCIUM 20 MG/1
TABLET, COATED ORAL
Qty: 90 TABLET | Refills: 0 | Status: SHIPPED | OUTPATIENT
Start: 2019-03-12 | End: 2019-08-28 | Stop reason: SDUPTHER

## 2019-03-13 ENCOUNTER — TELEPHONE (OUTPATIENT)
Dept: PHARMACY | Facility: CLINIC | Age: 69
End: 2019-03-13

## 2019-04-29 DIAGNOSIS — E78.2 MIXED HYPERLIPIDEMIA: ICD-10-CM

## 2019-04-30 RX ORDER — ROSUVASTATIN CALCIUM 20 MG/1
20 TABLET, COATED ORAL DAILY
Qty: 30 TABLET | Refills: 0 | Status: SHIPPED | OUTPATIENT
Start: 2019-04-30 | End: 2019-07-15 | Stop reason: SDUPTHER

## 2019-04-30 NOTE — TELEPHONE ENCOUNTER
Call pt. I refilled Crestor for a 30 day instead of a 90 day as requested. She is due for fasting labs. I gave her lab orders at her last visit on 12/18/18. She has open orders in her chart. Her last cholesterol labs were done almost a year ago on 5/15/18. She can fast 8-10 hours and have the labs done at a [address] close to her.

## 2019-05-02 ENCOUNTER — TELEPHONE (OUTPATIENT)
Dept: INTERNAL MEDICINE CLINIC | Age: 69
End: 2019-05-02

## 2019-05-02 NOTE — TELEPHONE ENCOUNTER
Medication:   Requested Prescriptions     Pending Prescriptions Disp Refills    LANTUS 100 UNIT/ML injection vial [Pharmacy Med Name: LANTUS U-100 INSULIN 10ML] 40 mL 0     Sig: ADMINISTER 60 UNITS UNDER THE SKIN EVERY MORNING AND EVERY NIGHT AT BEDTIME       Last Filled:  3/19/2019    Patient Phone Number: [phone] (home)     Last appt: 12/18/2018   Next appt: 5/24/2019    Last Labs DM:   Lab Results   Component Value Date    LABA1C 8.8 11/12/2018     Last Lipid:   Lab Results   Component Value Date    CHOL 115 05/15/2018    TRIG 108 05/15/2018    HDL 34 05/15/2018    HDL 43 01/23/2012    LDLCALC 59 05/15/2018     Last PSA: No results found for: PSA  Last Thyroid:   Lab Results   Component Value Date    TSH 1.01 08/27/2018

## 2019-05-02 NOTE — TELEPHONE ENCOUNTER
Left a detailed message on the patient's voicemail informing her that Crestor was call in for a 30 day supply and that she will need to fast 8-10 hours to complete fasting labs that the patient has not completed in December of 2018 at the office visit in which Dr. Minoo Gonzalez provided the patient with fasting lab orders.

## 2019-05-02 NOTE — TELEPHONE ENCOUNTER
Left a detailed message on the patient's voicemail informing her that Crestor was call in for a 30 day supply and that she will need to fast 8-10 hours to complete fasting labs that the patient has not completed in December of 2018 at the office visit in which Dr. Kofi Anne provided the patient with fasting lab orders.

## 2019-05-03 RX ORDER — INSULIN LISPRO 100 [IU]/ML
INJECTION, SOLUTION INTRAVENOUS; SUBCUTANEOUS
Qty: 15 ML | Refills: 1 | Status: SHIPPED | OUTPATIENT
Start: 2019-05-03 | End: 2019-08-05 | Stop reason: SDUPTHER

## 2019-05-03 RX ORDER — INSULIN GLARGINE 100 [IU]/ML
INJECTION, SOLUTION SUBCUTANEOUS
Qty: 40 ML | Refills: 1 | Status: SHIPPED | OUTPATIENT
Start: 2019-05-03 | End: 2019-07-01 | Stop reason: SDUPTHER

## 2019-07-15 DIAGNOSIS — E78.2 MIXED HYPERLIPIDEMIA: ICD-10-CM

## 2019-07-15 RX ORDER — ROSUVASTATIN CALCIUM 20 MG/1
20 TABLET, COATED ORAL DAILY
Qty: 30 TABLET | Refills: 0 | Status: SHIPPED | OUTPATIENT
Start: 2019-07-15 | End: 2019-07-25 | Stop reason: SDUPTHER

## 2019-07-15 NOTE — TELEPHONE ENCOUNTER
Medication:   Requested Prescriptions     Pending Prescriptions Disp Refills    rosuvastatin (CRESTOR) 20 MG tablet 30 tablet 0     Sig: Take 1 tablet by mouth daily Dispense generic       Last Filled:  4/30/19    Patient Phone Number: [phone] (home)     Last appt: 12/18/2018   Next appt: 7/25/2019    Last Lipid:   Lab Results   Component Value Date    CHOL 115 05/15/2018    TRIG 108 05/15/2018    HDL 34 05/15/2018    HDL 43 01/23/2012    LDLCALC 59 05/15/2018       Last OARRS: No flowsheet data found.     Preferred Pharmacy:   [facility] [address] [place] [address]  Phone: [phone] Fax: [phone]

## 2019-07-16 RX ORDER — ROSUVASTATIN CALCIUM 20 MG/1
TABLET, COATED ORAL
Qty: 90 TABLET | Refills: 0 | OUTPATIENT
Start: 2019-07-16

## 2019-07-16 NOTE — TELEPHONE ENCOUNTER
Medication:   Requested Prescriptions     Pending Prescriptions Disp Refills    rosuvastatin (CRESTOR) 20 MG tablet [Pharmacy Med Name: ROSUVASTATIN 20MG TABLETS] 90 tablet 0     Sig: TAKE 1 TABLET BY MOUTH DAILY       Last Filled:  REQUESTING [address]    Patient Phone Number: [phone] (home)     Last appt: 12/18/2018   Next appt: 7/25/2019    Last Lipid:   Lab Results   Component Value Date    CHOL 115 05/15/2018    TRIG 108 05/15/2018    HDL 34 05/15/2018    HDL 43 01/23/2012    LDLCALC 59 05/15/2018       Last OARRS: No flowsheet data found.     Preferred Pharmacy:   [facility] [address] [place], [place] [id number]  Phone: [phone] Fax: [phone]

## 2019-07-25 ENCOUNTER — OFFICE VISIT (OUTPATIENT)
Dept: INTERNAL MEDICINE CLINIC | Age: 69
End: 2019-07-25
Payer: MEDICARE

## 2019-07-25 VITALS
BODY MASS INDEX: 37 KG/M2 | TEMPERATURE: 98.2 F | HEIGHT: 66 IN | HEART RATE: 55 BPM | OXYGEN SATURATION: 97 % | RESPIRATION RATE: 14 BRPM | WEIGHT: 230.2 LBS | SYSTOLIC BLOOD PRESSURE: 134 MMHG | DIASTOLIC BLOOD PRESSURE: 70 MMHG

## 2019-07-25 DIAGNOSIS — I10 ESSENTIAL HYPERTENSION: ICD-10-CM

## 2019-07-25 DIAGNOSIS — Z00.00 ANNUAL PHYSICAL EXAM: Primary | ICD-10-CM

## 2019-07-25 DIAGNOSIS — Z79.4 TYPE 2 DIABETES MELLITUS WITHOUT COMPLICATION, WITH LONG-TERM CURRENT USE OF INSULIN (HCC): ICD-10-CM

## 2019-07-25 DIAGNOSIS — R82.998 LEUKOCYTES IN URINE: ICD-10-CM

## 2019-07-25 DIAGNOSIS — E78.2 MIXED HYPERLIPIDEMIA: ICD-10-CM

## 2019-07-25 DIAGNOSIS — R31.29 MICROSCOPIC HEMATURIA: ICD-10-CM

## 2019-07-25 DIAGNOSIS — E11.9 TYPE 2 DIABETES MELLITUS WITHOUT COMPLICATION, WITH LONG-TERM CURRENT USE OF INSULIN (HCC): ICD-10-CM

## 2019-07-25 DIAGNOSIS — Z12.31 ENCOUNTER FOR SCREENING MAMMOGRAM FOR BREAST CANCER: ICD-10-CM

## 2019-07-25 DIAGNOSIS — L30.9 DERMATITIS OF EXTERNAL EAR: ICD-10-CM

## 2019-07-25 DIAGNOSIS — E55.9 VITAMIN D DEFICIENCY: ICD-10-CM

## 2019-07-25 LAB
BILIRUBIN, POC: ABNORMAL
BLOOD URINE, POC: ABNORMAL
CLARITY, POC: ABNORMAL
COLOR, POC: YELLOW
CREATININE URINE: 106.2 MG/DL (ref 28–259)
GLUCOSE URINE, POC: ABNORMAL
HBA1C MFR BLD: 10.1 %
KETONES, POC: ABNORMAL
LEUKOCYTE EST, POC: ABNORMAL
MICROALBUMIN UR-MCNC: 15 MG/DL
MICROALBUMIN/CREAT UR-RTO: 141.2 MG/G (ref 0–30)
NITRITE, POC: POSITIVE
PH, POC: 7
PROTEIN, POC: 30
SPECIFIC GRAVITY, POC: 1.02
UROBILINOGEN, POC: 0.2

## 2019-07-25 PROCEDURE — 99397 PER PM REEVAL EST PAT 65+ YR: CPT | Performed by: INTERNAL MEDICINE

## 2019-07-25 PROCEDURE — 81002 URINALYSIS NONAUTO W/O SCOPE: CPT | Performed by: INTERNAL MEDICINE

## 2019-07-25 PROCEDURE — 83036 HEMOGLOBIN GLYCOSYLATED A1C: CPT | Performed by: INTERNAL MEDICINE

## 2019-07-25 ASSESSMENT — PATIENT HEALTH QUESTIONNAIRE - PHQ9
SUM OF ALL RESPONSES TO PHQ QUESTIONS 1-9: 0
SUM OF ALL RESPONSES TO PHQ QUESTIONS 1-9: 0
SUM OF ALL RESPONSES TO PHQ9 QUESTIONS 1 & 2: 0
2. FEELING DOWN, DEPRESSED OR HOPELESS: 0
1. LITTLE INTEREST OR PLEASURE IN DOING THINGS: 0

## 2019-07-25 ASSESSMENT — ENCOUNTER SYMPTOMS
WHEEZING: 0
EYE DISCHARGE: 0
ABDOMINAL DISTENTION: 0
EYE PAIN: 0
TROUBLE SWALLOWING: 0
VOMITING: 0
RHINORRHEA: 0
FACIAL SWELLING: 0
CHOKING: 0
SHORTNESS OF BREATH: 0
EYE ITCHING: 0
ANAL BLEEDING: 0
CONSTIPATION: 0
EYE REDNESS: 0
NAUSEA: 0
SINUS PRESSURE: 0
ABDOMINAL PAIN: 0
BACK PAIN: 0
RECTAL PAIN: 0
VOICE CHANGE: 0
PHOTOPHOBIA: 0
BLOOD IN STOOL: 0
CHEST TIGHTNESS: 0
SORE THROAT: 0
DIARRHEA: 0
COUGH: 0
APNEA: 0

## 2019-07-25 NOTE — PATIENT INSTRUCTIONS
1. Annual physical exam  - Comprehensive Metabolic Panel; Future  - CBC Auto Differential; Future  - Lipid Panel; Future  - Pap smear done on 2/15/18  - Mammogram done on 10/4/17  - Colonoscopy done on 7/20/18  - Tdap done on 8/18/10  - Pneumovax 23 done on 7/10/15  - Prevnar 13 done on 12/1/16  - Patient states she has had the Shingles vaccine and documentation will be requested  -Regular aerobic exercise    2. Uncontrolled type 2 diabetes mellitus without complication, with long-term current use of insulin ([facility])  - Hemoglobin A1c of 10.1% shows diabetes is uncontrolled  -Continue same medications  -Increase Lantus insulin to 55 units twice daily  -Limit carbohydrates to 45 grams with meals and 15 grams with snacks  -monitor blood sugars  -Regular aerobic exercise  -MICROALBUMIN / CREATININE URINE RATIO  -POCT glycosylated hemoglobin (Hb A1C)  - DIABETES FOOT EXAM  -Comprehensive Metabolic Panel; Future  - Follow up with Endocrinology, Dr. Hailey Ortiz as scheduled for 8/5/19  -Referral to [address] [place] [address] Diabetes Education    3. Essential hypertension  -stable  -Continue same medications  -Low sodium diet  -Regular aerobic exercise  - POCT Urinalysis no Micro  - Comprehensive Metabolic Panel; Future    4. Mixed hyperlipidemia  -Continue same medications  -Low fat, low cholesterol diet  -Regular aerobic exercise  - Comprehensive Metabolic Panel; Future  - Lipid Panel; Future    5. Vitamin D deficiency  - continue same dose of vitamin D  - Vitamin D 25 Hydroxy; Future    6. Encounter for screening mammogram for breast cancer  - SHERIN DIGITAL SCREEN W OR WO CAD BILATERAL; Future    7. Dermatitis of external ear  - hydrocortisone 2.5 % cream; APPLY EXTERNALLY TO THE AFFECTED AREA TWICE DAILY  Dispense: 30 g; Refill: 0        Patient Education        Well Visit, Over 72: Care Instructions  Your Care Instructions    Physical exams can help you stay healthy.  Your doctor has checked your overall health and may have suggested ways to take good care of yourself. He or she also may have recommended tests. At home, you can help prevent illness with healthy eating, regular exercise, and other steps. Follow-up care is a key part of your treatment and safety. Be sure to make and go to all appointments, and call your doctor if you are having problems. It's also a good idea to know your test results and keep a list of the medicines you take. How can you care for yourself at home? · Reach and stay at a healthy weight. This will lower your risk for many problems, such as obesity, diabetes, heart disease, and high blood pressure. · Get at least 30 minutes of exercise on most days of the week. Walking is a good choice. You also may want to do other activities, such as running, swimming, cycling, or playing tennis or team sports. · Do not smoke. Smoking can make health problems worse. If you need help quitting, talk to your doctor about stop-smoking programs and medicines. These can increase your chances of quitting for good. · Protect your skin from too much sun. When you're outdoors from 10 a.m. to 4 p.m., stay in the shade or cover up with clothing and a hat with a wide brim. Wear sunglasses that block UV rays. Even when it's cloudy, put broad-spectrum sunscreen (SPF 30 or higher) on any exposed skin. · See a dentist one or two times a year for checkups and to have your teeth cleaned. · Wear a seat belt in the car. · Limit alcohol to 2 drinks a day for men and 1 drink a day for women. Too much alcohol can cause health problems. Follow your doctor's advice about when to have certain tests. These tests can spot problems early. For men and women  · Cholesterol. Your doctor will tell you how often to have this done based on your overall health and other things that can increase your risk for heart attack and stroke. · Blood pressure. Have your blood pressure checked during a routine doctor visit.  Your doctor will tell you how often to check your blood pressure based on your age, your blood pressure results, and other factors. · Diabetes. Ask your doctor whether you should have tests for diabetes. · Vision. Experts recommend that you have yearly exams for glaucoma and other age-related eye problems. · Hearing. Tell your doctor if you notice any change in your hearing. You can have tests to find out how well you hear. · Colon cancer tests. Keep having colon cancer tests as your doctor recommends. You can have one of several types of tests. · Heart attack and stroke risk. At least every 4 to 6 years, you should have your risk for heart attack and stroke assessed. Your doctor uses factors such as your age, blood pressure, cholesterol, and whether you smoke or have diabetes to show what your risk for a heart attack or stroke is over the next 10 years. · Osteoporosis. Talk to your doctor about whether you should have a bone density test to find out whether you have thinning bones. Also ask your doctor about whether you should take calcium and vitamin D supplements. For women  · Pap test and pelvic exam. You may no longer need a Pap test. Talk with your doctor about whether to stop or continue to have Pap tests. · Breast exam and mammogram. Ask how often you should have a mammogram, which is an X-ray of your breasts. A mammogram can spot breast cancer before it can be felt and when it is easiest to treat. · Thyroid disease. Talk to your doctor about whether to have your thyroid checked as part of a regular physical exam. Women have an increased chance of a thyroid problem. For men  · Prostate exam. Talk to your doctor about whether you should have a blood test (called a PSA test) for prostate cancer. Experts recommend that you discuss the benefits and risks of the test with your doctor before you decide whether to have this test. Some experts say that men ages 79 and older no longer need testing. · Abdominal aortic aneurysm.  Ask your doctor whether

## 2019-07-25 NOTE — PROGRESS NOTES
Joanna Stephenson   YOB: 1950    Date of Visit:  7/25/2019    Chief Complaint   Patient presents with    Annual Exam       HPI  Pt presents for annual physical exam. Pap smear done on 2/15/18. Mammogram done on 10/4/17. Colonoscopy done on 7/20/18    Diabetes Mellitus Type 2:   Current Medications: Lantus 52 units twice daily, Trulicity 9.82BH once a week, and Humalog sliding scale before meal  Diet: Patient states she tries to decrease carbohydrates  Home blood sugar records: patient tests 1 time(s) per day fasting and it averages 175-210  Any episodes of hypoglycemia? no  Eye exam current (within one year): yes on 12/7/18  Daily Aspirin? No: due to an ulcer  Patient has an appointment with Endocrinology on 8/5/19  Current exercise: no regular exercise    Hypertension:    Current Medications: Losartan-HCTZ 100-25mg po q day  Home blood pressure monitoring: No.    Patient doesn't add salt   Antihypertensive medication side effects: no medication side effects noted.       Hyperlipidemia:  Current Medication: Rosuvastatin 20mg po q day   No side effects    Diet: Patient tries to decrease fat and cholesterol     Laryngopharyngeal reflux:  Current medication: Omeprazole 40mg occasionally. Pt denies heartburn and reflux. Pt decreases caffeine. Pt states she started back drinking pop 1-2 times per week. Pt states she doesn't eat a lot of tomato based foods.      Vitamin D deficiency:  Pt takes Vitamin D 50,000 IU once a week         Review of Systems   Constitutional: Negative for activity change, appetite change, chills, diaphoresis, fatigue, fever and unexpected weight change. HENT: Negative for congestion, ear discharge, ear pain, facial swelling, hearing loss, mouth sores, nosebleeds, postnasal drip, rhinorrhea, sinus pressure, sneezing, sore throat, tinnitus, trouble swallowing and voice change. Eyes: Negative for photophobia, pain, discharge, redness, itching and visual disturbance. Influenza Vaccine, unspecified formulation 10/02/2015, 12/01/2016    Influenza, High Dose (Fluzone 65 yrs and older) 10/02/2015, 12/01/2016, 12/18/2018    Pneumococcal Conjugate 13-valent ([id number]) 12/01/2016    Pneumococcal Polysaccharide ([id number]) 07/10/2015    Tdap (Boostrix, Adacel) 08/18/2010       Vitals:    07/25/19 1312   BP: 134/70   Site: Right Upper Arm   Position: Sitting   Cuff Size: Large Adult   Pulse: 55   Resp: 14   Temp: 98.2 °F (36.8 °C)   TempSrc: Oral   SpO2: 97%   Weight: 230 lb 3.2 oz (104.4 kg)   Height: 5' 6\" (1.676 m)     Body mass index is 37.16 kg/m². Physical Exam   Constitutional: She is oriented to person, place, and time. She appears well-developed and well-nourished. No distress. HENT:   Head: Normocephalic and atraumatic. Right Ear: Hearing, tympanic membrane and ear canal normal.   Left Ear: Hearing, tympanic membrane and ear canal normal.   Nose: Nose normal.   Mouth/Throat: Uvula is midline, oropharynx is clear and moist and mucous membranes are normal.   Eyes: Pupils are equal, round, and reactive to light. Conjunctivae, EOM and lids are normal.   Neck: Neck supple. Carotid bruit is not present. No thyromegaly present. Cardiovascular: Normal rate, regular rhythm, S1 normal, S2 normal, normal heart sounds and intact distal pulses. Exam reveals no gallop and no friction rub. No murmur heard. Pulmonary/Chest: Effort normal and breath sounds normal. No respiratory distress. She has no wheezes. She has no rhonchi. She has no rales. Right breast exhibits no inverted nipple, no mass, no nipple discharge, no skin change and no tenderness. Left breast exhibits no inverted nipple, no mass, no nipple discharge, no skin change and no tenderness. Breasts are symmetrical.   Abdominal: Soft. Normal appearance and bowel sounds are normal. She exhibits no distension and no mass. There is no hepatosplenomegaly. There is no tenderness. There is no rebound.    Genitourinary:

## 2019-07-28 LAB
ORGANISM: ABNORMAL
URINE CULTURE, ROUTINE: ABNORMAL
URINE CULTURE, ROUTINE: ABNORMAL

## 2019-07-29 ENCOUNTER — TELEPHONE (OUTPATIENT)
Dept: INTERNAL MEDICINE CLINIC | Age: 69
End: 2019-07-29

## 2019-07-29 DIAGNOSIS — N39.0 URINARY TRACT INFECTION WITHOUT HEMATURIA, SITE UNSPECIFIED: Primary | ICD-10-CM

## 2019-07-29 RX ORDER — CIPROFLOXACIN 250 MG/1
250 TABLET, FILM COATED ORAL 2 TIMES DAILY
Qty: 14 TABLET | Refills: 0 | Status: SHIPPED | OUTPATIENT
Start: 2019-07-29 | End: 2019-08-05

## 2019-08-01 ENCOUNTER — OFFICE VISIT (OUTPATIENT)
Dept: DIABETES SERVICES | Age: 69
End: 2019-08-01

## 2019-08-01 DIAGNOSIS — E11.9 TYPE 2 DIABETES MELLITUS WITHOUT COMPLICATION, WITHOUT LONG-TERM CURRENT USE OF INSULIN (HCC): Primary | ICD-10-CM

## 2019-08-05 ENCOUNTER — OFFICE VISIT (OUTPATIENT)
Dept: ENDOCRINOLOGY | Age: 69
End: 2019-08-05
Payer: MEDICARE

## 2019-08-05 VITALS
WEIGHT: 234.4 LBS | HEART RATE: 86 BPM | HEIGHT: 66 IN | BODY MASS INDEX: 37.67 KG/M2 | DIASTOLIC BLOOD PRESSURE: 71 MMHG | SYSTOLIC BLOOD PRESSURE: 145 MMHG | OXYGEN SATURATION: 99 %

## 2019-08-05 DIAGNOSIS — E78.2 MIXED HYPERLIPIDEMIA: ICD-10-CM

## 2019-08-05 DIAGNOSIS — I10 ESSENTIAL HYPERTENSION: Primary | ICD-10-CM

## 2019-08-05 PROCEDURE — 3046F HEMOGLOBIN A1C LEVEL >9.0%: CPT | Performed by: INTERNAL MEDICINE

## 2019-08-05 PROCEDURE — G8427 DOCREV CUR MEDS BY ELIG CLIN: HCPCS | Performed by: INTERNAL MEDICINE

## 2019-08-05 PROCEDURE — 1123F ACP DISCUSS/DSCN MKR DOCD: CPT | Performed by: INTERNAL MEDICINE

## 2019-08-05 PROCEDURE — G8417 CALC BMI ABV UP PARAM F/U: HCPCS | Performed by: INTERNAL MEDICINE

## 2019-08-05 PROCEDURE — 1036F TOBACCO NON-USER: CPT | Performed by: INTERNAL MEDICINE

## 2019-08-05 PROCEDURE — 4040F PNEUMOC VAC/ADMIN/RCVD: CPT | Performed by: INTERNAL MEDICINE

## 2019-08-05 PROCEDURE — 2022F DILAT RTA XM EVC RTNOPTHY: CPT | Performed by: INTERNAL MEDICINE

## 2019-08-05 PROCEDURE — G8399 PT W/DXA RESULTS DOCUMENT: HCPCS | Performed by: INTERNAL MEDICINE

## 2019-08-05 PROCEDURE — 1090F PRES/ABSN URINE INCON ASSESS: CPT | Performed by: INTERNAL MEDICINE

## 2019-08-05 PROCEDURE — 99214 OFFICE O/P EST MOD 30 MIN: CPT | Performed by: INTERNAL MEDICINE

## 2019-08-05 PROCEDURE — 3017F COLORECTAL CA SCREEN DOC REV: CPT | Performed by: INTERNAL MEDICINE

## 2019-08-05 RX ORDER — LANCETS
1 EACH MISCELLANEOUS 4 TIMES DAILY
Qty: 100 EACH | Refills: 6 | Status: SHIPPED | OUTPATIENT
Start: 2019-08-05 | End: 2019-08-05 | Stop reason: SDUPTHER

## 2019-08-05 RX ORDER — INSULIN GLARGINE 100 [IU]/ML
55 INJECTION, SOLUTION SUBCUTANEOUS 2 TIMES DAILY
Qty: 4 VIAL | Refills: 2 | Status: SHIPPED | OUTPATIENT
Start: 2019-08-05 | End: 2019-12-03 | Stop reason: SDUPTHER

## 2019-08-05 RX ORDER — CALCIUM CARB/VITAMIN D3/VIT K1 500-100-40
TABLET,CHEWABLE ORAL
Qty: 200 EACH | Refills: 2 | Status: SHIPPED | OUTPATIENT
Start: 2019-08-05 | End: 2020-04-27 | Stop reason: SDUPTHER

## 2019-08-05 ASSESSMENT — ENCOUNTER SYMPTOMS
COUGH: 0
PHOTOPHOBIA: 0
CONSTIPATION: 0
HEARTBURN: 0
BLURRED VISION: 0
ORTHOPNEA: 0
DIARRHEA: 0
ABDOMINAL PAIN: 0
HEMOPTYSIS: 0
BACK PAIN: 0
NAUSEA: 0
DOUBLE VISION: 0

## 2019-08-05 NOTE — PROGRESS NOTES
No thyromegaly present. Cardiovascular: Normal rate and normal heart sounds. Pulmonary/Chest: Effort normal. No respiratory distress. She has no wheezes. Abdominal: Soft. Bowel sounds are normal. There is no tenderness. Musculoskeletal: She exhibits no edema. Neurological: She is alert and oriented to person, place, and time. Skin: Skin is warm and dry. She is not diaphoretic. Psychiatric: Her behavior is normal. Thought content normal.         Office Visit on 07/25/2019   Component Date Value Ref Range Status    Microalbumin, Random Urine 07/25/2019 15.00* <2.0 mg/dL Final    Creatinine, Ur 07/25/2019 106.2  28.0 - 259.0 mg/dL Final    Microalbumin Creatinine Ratio 07/25/2019 141.2* 0.0 - 30.0 mg/g Final    Color, UA 07/25/2019 Yellow   Final    Clarity, UA 07/25/2019 Cloudy   Final    Glucose, UA POC 07/25/2019 N   Final    Bilirubin, UA 07/25/2019 N   Final    Ketones, UA 07/25/2019 N   Final    Spec Grav, UA 07/25/2019 1.020   Final    Blood, UA POC 07/25/2019 Trace-Intact   Final    pH, UA 07/25/2019 7.0   Final    Protein, UA POC 07/25/2019 30   Final    Urobilinogen, UA 07/25/2019 0.2   Final    Leukocytes, UA 07/25/2019 Small   Final    Nitrite, UA 07/25/2019 Positive   Final    Hemoglobin A1C 07/25/2019 10.1  % Final    Organism 07/25/2019 Klebsiella pneumoniae*  Final    Urine Culture, Routine 07/25/2019 >100,000 CFU/ml   Final           Assessment/Plan      1. Type 2 DM     Marina Bentley is a 71 y.o. female has Type 2 DM with obesity and insulin resistance. Uncontrolled. A1c 9 % ---> 8.8 % ---> 27.8 %     Complicated by retinopathy. Non-compliant with medications and follow-ups  Had a detailed discussion again explaining risk of diabetes related complications with uncontrolled DM. She says she is motivated to make changes      -Continue Lantus insulin 55 units BID  -Will increase humalog to 10 units TID with meals. ( use 5 units if BS .  Hold if < 80)    -Continue Trulicity 9.58 mg weekly. ( showed her correct way of using pens)    -Advised to test and document sugars 4 times a day to qualify for the [facility] sensor.     -Will refer to [address] [place] [address] for diabetes education. Advised follow-up with the ophthalmologist.  Urine microalbumin/cr ratio high . On Arb   Discussed foot care. Pt on ASA. 2. Hypertension. BP high today  Normal at PCP's office     3. Hyperlipidemia. On statins.    HDL 34  LDL 59

## 2019-08-06 RX ORDER — LANCETS
EACH MISCELLANEOUS
Qty: 300 EACH | Refills: 6 | Status: SHIPPED | OUTPATIENT
Start: 2019-08-06

## 2019-08-06 NOTE — PROGRESS NOTES
Patient attempted to attended Group Diabetes Class but showed at the wrong location.      Vivi Felix

## 2019-08-09 ENCOUNTER — HOSPITAL ENCOUNTER (OUTPATIENT)
Dept: MAMMOGRAPHY | Age: 69
Discharge: HOME | End: 2019-08-09
Payer: MEDICARE

## 2019-08-09 DIAGNOSIS — Z12.31 ENCOUNTER FOR SCREENING MAMMOGRAM FOR BREAST CANCER: ICD-10-CM

## 2019-08-09 PROCEDURE — 77067 SCR MAMMO BI INCL CAD: CPT

## 2019-08-12 ENCOUNTER — TELEPHONE (OUTPATIENT)
Dept: PRIMARY CARE CLINIC | Age: 69
End: 2019-08-12

## 2019-08-12 DIAGNOSIS — J06.9 UPPER RESPIRATORY TRACT INFECTION, UNSPECIFIED TYPE: Primary | ICD-10-CM

## 2019-08-12 RX ORDER — BROMPHENIRAMINE MALEATE, PSEUDOEPHEDRINE HYDROCHLORIDE, AND DEXTROMETHORPHAN HYDROBROMIDE 2; 30; 10 MG/5ML; MG/5ML; MG/5ML
5 SYRUP ORAL 4 TIMES DAILY PRN
Qty: 120 ML | Refills: 0 | Status: SHIPPED | OUTPATIENT
Start: 2019-08-12 | End: 2020-09-15

## 2019-08-12 RX ORDER — AMOXICILLIN 875 MG/1
875 TABLET, COATED ORAL 2 TIMES DAILY
Qty: 20 TABLET | Refills: 0 | Status: SHIPPED | OUTPATIENT
Start: 2019-08-12 | End: 2019-08-22

## 2019-08-15 ENCOUNTER — TELEPHONE (OUTPATIENT)
Dept: PRIMARY CARE CLINIC | Age: 69
End: 2019-08-15

## 2019-08-15 DIAGNOSIS — R05.9 COUGH: Primary | ICD-10-CM

## 2019-08-16 RX ORDER — DEXTROMETHORPHAN HYDROBROMIDE AND PROMETHAZINE HYDROCHLORIDE 15; 6.25 MG/5ML; MG/5ML
5 SYRUP ORAL 4 TIMES DAILY PRN
Qty: 120 ML | Refills: 0 | Status: SHIPPED | OUTPATIENT
Start: 2019-08-16 | End: 2019-08-22

## 2019-08-19 DIAGNOSIS — E78.2 MIXED HYPERLIPIDEMIA: ICD-10-CM

## 2019-08-20 ENCOUNTER — TELEPHONE (OUTPATIENT)
Dept: ENDOCRINOLOGY | Age: 69
End: 2019-08-20

## 2019-08-20 ENCOUNTER — OFFICE VISIT (OUTPATIENT)
Dept: ENDOCRINOLOGY | Age: 69
End: 2019-08-20
Payer: MEDICARE

## 2019-08-20 DIAGNOSIS — E11.65 UNCONTROLLED TYPE 2 DIABETES MELLITUS WITH HYPERGLYCEMIA (HCC): Primary | ICD-10-CM

## 2019-08-20 PROCEDURE — 97802 MEDICAL NUTRITION INDIV IN: CPT | Performed by: DIETITIAN, REGISTERED

## 2019-08-20 NOTE — PROGRESS NOTES
Problem List   Diagnosis    HTN (hypertension)    Hyperlipidemia    Diabetes mellitus ([facility]    Diabetes mellitus ([facility]    Non-compliance with treatment    Diabetes mellitus type 2, uncontrolled ([facility]    Bilateral impacted cerumen       Current Outpatient Medications   Medication Sig Dispense Refill    promethazine-dextromethorphan (PROMETHAZINE-DM) 6.25-15 MG/5ML syrup Take 5 mLs by mouth 4 times daily as needed for Cough 120 mL 0    amoxicillin (AMOXIL) 875 MG tablet Take 1 tablet by mouth 2 times daily for 10 days 20 tablet 0    brompheniramine-pseudoephedrine-DM (BROMFED DM) 2-30-10 MG/5ML syrup Take 5 mLs by mouth 4 times daily as needed for Congestion or Cough 120 mL 0    Dulaglutide (TRULICITY) 5.63 MU/2.2SF SOPN INJECT 0.75MG(1 SYRINGE) UNDER THE SKIN ONCE WEEKLY 2 mL 3    ACCU-CHEK SOFTCLIX LANCETS MISC USE FOUR TIMES DAILY 300 each 6    insulin lispro (HUMALOG KWIKPEN) 100 UNIT/ML pen Inject 10 Units into the skin 3 times daily (before meals) INJECT 10 UNITS INTO THE SKIN THREE TIMES DAILY 30 mL 1    insulin glargine (LANTUS) 100 UNIT/ML injection vial Inject 55 Units into the skin 2 times daily 4 vial 2    Insulin Syringe-Needle U-100 (INSULIN SYRINGE 1CC/31GX5/16\") 31G X 5/16\" 1 ML MISC USE AS DIRECTED TWICE DAILY 200 each 2    blood glucose test strips (ACCU-CHEK ANJU PLUS) strip TEST BLOOD SUGAR THREE TIMES DAILY 300 strip 2    hydrocortisone 2.5 % cream APPLY EXTERNALLY TO THE AFFECTED AREA TWICE DAILY 30 g 0    rosuvastatin (CRESTOR) 20 MG tablet TAKE 1 TABLET BY MOUTH DAILY 90 tablet 0    vitamin D (ERGOCALCIFEROL) 62685 units CAPS capsule TAKE 1 CAPSULE BY MOUTH EVERY WEEK 4 capsule 5    losartan-hydrochlorothiazide (HYZAAR) 100-25 MG per tablet TAKE 1 TABLET BY MOUTH DAILY 90 tablet 1    OMEGA-3 FATTY ACIDS PO Omega-3 Fatty Acids Oral orallyActive      omeprazole (PRILOSEC) 40 MG delayed release capsule TAKE ONE CAPSULE BY MOUTH DAILY ONE HOUR BEFORE EVENING MEAL 90 capsule 3  Blood Glucose Monitoring Suppl (ACCU-CHEK ANJU PLUS) W/DEVICE KIT Use to monitor blood sugars 1 kit 0    ibuprofen (ADVIL;MOTRIN) 800 MG tablet Take 1 tablet by mouth 3 times daily as needed for Pain 60 tablet 0    Compression Stockings MISC by Does not apply route Dispense 1 pair of compression stockings 15-20mmHg 1 each 0     No current facility-administered medications for this visit. NUTRITION ASSESSMENT    Biochemical Data:    Lab Results   Component Value Date    LABA1C 10.1 07/25/2019     Lab Results   Component Value Date    .5 12/30/2016       Lab Results   Component Value Date    CHOL 115 05/15/2018    CHOL 135 12/30/2016    CHOL 125 04/19/2016     Lab Results   Component Value Date    TRIG 108 05/15/2018    TRIG 148 12/30/2016    TRIG 101 04/19/2016     Lab Results   Component Value Date    HDL 34 (L) 05/15/2018    HDL 42 12/30/2016    HDL 39 (L) 04/19/2016     Lab Results   Component Value Date    LDLCALC 59 05/15/2018    LDLCALC 63 12/30/2016    LDLCALC 66 04/19/2016     Lab Results   Component Value Date    LABVLDL 22 05/15/2018    LABVLDL 30 12/30/2016    LABVLDL 20 04/19/2016     No results found for: [facility]    Lab Results   Component Value Date    WBC 10.1 08/27/2018    HGB 10.3 (L) 08/27/2018    HCT 30.3 (L) 08/27/2018    MCV 89.2 08/27/2018     08/27/2018       Lab Results   Component Value Date    CREATININE 1.1 08/27/2018    BUN 24 (H) 08/27/2018     08/27/2018    K 4.0 08/27/2018     08/27/2018    CO2 30 08/27/2018       Diabetes Medications: Yes  Knows name and dose of prescribed medications Yes  Knows prescribed schedule for medicationsYes  Recent change in medication type/dosage: Yes  Stores  medications properlyYes  Comments: Patient is prescribed Humalog pens. She is drawing the insulin from the pen into a syringe. She says this is because she did not think she was getting all the insulin she was injecting.

## 2019-08-22 ENCOUNTER — HOSPITAL ENCOUNTER (EMERGENCY)
Age: 69
Discharge: HOME | End: 2019-08-22
Attending: EMERGENCY MEDICINE
Payer: MEDICARE

## 2019-08-22 ENCOUNTER — APPOINTMENT (OUTPATIENT)
Dept: GENERAL RADIOLOGY | Age: 69
End: 2019-08-22
Payer: MEDICARE

## 2019-08-22 VITALS
TEMPERATURE: 98.8 F | OXYGEN SATURATION: 96 % | BODY MASS INDEX: 36.26 KG/M2 | HEART RATE: 85 BPM | RESPIRATION RATE: 19 BRPM | HEIGHT: 67 IN | WEIGHT: 231.04 LBS | SYSTOLIC BLOOD PRESSURE: 145 MMHG | DIASTOLIC BLOOD PRESSURE: 65 MMHG

## 2019-08-22 DIAGNOSIS — R05.9 COUGH: Primary | ICD-10-CM

## 2019-08-22 PROCEDURE — 71046 X-RAY EXAM CHEST 2 VIEWS: CPT

## 2019-08-22 PROCEDURE — 99283 EMERGENCY DEPT VISIT LOW MDM: CPT

## 2019-08-22 RX ORDER — GUAIFENESIN AND CODEINE PHOSPHATE 100; 10 MG/5ML; MG/5ML
5 SOLUTION ORAL 3 TIMES DAILY PRN
Qty: 118 ML | Refills: 0 | Status: SHIPPED | OUTPATIENT
Start: 2019-08-22 | End: 2019-08-29

## 2019-08-22 RX ORDER — FAMOTIDINE 20 MG/1
20 TABLET, FILM COATED ORAL 2 TIMES DAILY
Qty: 60 TABLET | Refills: 0 | Status: SHIPPED | OUTPATIENT
Start: 2019-08-22 | End: 2019-11-13 | Stop reason: SDUPTHER

## 2019-08-23 ENCOUNTER — TELEPHONE (OUTPATIENT)
Dept: PRIMARY CARE CLINIC | Age: 69
End: 2019-08-23

## 2019-08-23 DIAGNOSIS — T36.95XA ANTIBIOTIC-INDUCED YEAST INFECTION: Primary | ICD-10-CM

## 2019-08-23 DIAGNOSIS — B37.9 ANTIBIOTIC-INDUCED YEAST INFECTION: Primary | ICD-10-CM

## 2019-08-23 RX ORDER — ROSUVASTATIN CALCIUM 20 MG/1
TABLET, COATED ORAL
Qty: 90 TABLET | Refills: 0 | OUTPATIENT
Start: 2019-08-23

## 2019-08-23 ASSESSMENT — ENCOUNTER SYMPTOMS
SHORTNESS OF BREATH: 0
CHEST TIGHTNESS: 0
WHEEZING: 0
STRIDOR: 0
COUGH: 1

## 2019-08-26 DIAGNOSIS — Z00.00 ANNUAL PHYSICAL EXAM: ICD-10-CM

## 2019-08-26 DIAGNOSIS — I10 ESSENTIAL HYPERTENSION: ICD-10-CM

## 2019-08-26 DIAGNOSIS — E78.2 MIXED HYPERLIPIDEMIA: ICD-10-CM

## 2019-08-26 DIAGNOSIS — E55.9 VITAMIN D DEFICIENCY: ICD-10-CM

## 2019-08-26 LAB
A/G RATIO: 1.2 (ref 1.1–2.2)
ALBUMIN SERPL-MCNC: 3.9 G/DL (ref 3.4–5)
ALP BLD-CCNC: 107 U/L (ref 40–129)
ALT SERPL-CCNC: 31 U/L (ref 10–40)
ANION GAP SERPL CALCULATED.3IONS-SCNC: 14 MMOL/L (ref 3–16)
AST SERPL-CCNC: 29 U/L (ref 15–37)
BASOPHILS ABSOLUTE: 0.1 K/UL (ref 0–0.2)
BASOPHILS RELATIVE PERCENT: 0.8 %
BILIRUB SERPL-MCNC: <0.2 MG/DL (ref 0–1)
BUN BLDV-MCNC: 17 MG/DL (ref 7–20)
CALCIUM SERPL-MCNC: 9.1 MG/DL (ref 8.3–10.6)
CHLORIDE BLD-SCNC: 97 MMOL/L (ref 99–110)
CHOLESTEROL, TOTAL: 216 MG/DL (ref 0–199)
CO2: 29 MMOL/L (ref 21–32)
CREAT SERPL-MCNC: 1 MG/DL (ref 0.6–1.2)
EOSINOPHILS ABSOLUTE: 0.2 K/UL (ref 0–0.6)
EOSINOPHILS RELATIVE PERCENT: 2.9 %
GFR AFRICAN AMERICAN: >60
GFR NON-AFRICAN AMERICAN: 55
GLOBULIN: 3.2 G/DL
GLUCOSE BLD-MCNC: 78 MG/DL (ref 70–99)
HCT VFR BLD CALC: 32.5 % (ref 36–48)
HDLC SERPL-MCNC: 36 MG/DL (ref 40–60)
HEMOGLOBIN: 11.2 G/DL (ref 12–16)
LDL CHOLESTEROL CALCULATED: 149 MG/DL
LYMPHOCYTES ABSOLUTE: 3.1 K/UL (ref 1–5.1)
LYMPHOCYTES RELATIVE PERCENT: 35.2 %
MCH RBC QN AUTO: 31 PG (ref 26–34)
MCHC RBC AUTO-ENTMCNC: 34.3 G/DL (ref 31–36)
MCV RBC AUTO: 90.4 FL (ref 80–100)
MONOCYTES ABSOLUTE: 0.6 K/UL (ref 0–1.3)
MONOCYTES RELATIVE PERCENT: 7.3 %
NEUTROPHILS ABSOLUTE: 4.7 K/UL (ref 1.7–7.7)
NEUTROPHILS RELATIVE PERCENT: 53.8 %
PDW BLD-RTO: 13.1 % (ref 12.4–15.4)
PLATELET # BLD: 430 K/UL (ref 135–450)
PMV BLD AUTO: 6.6 FL (ref 5–10.5)
POTASSIUM SERPL-SCNC: 4 MMOL/L (ref 3.5–5.1)
RBC # BLD: 3.6 M/UL (ref 4–5.2)
SODIUM BLD-SCNC: 140 MMOL/L (ref 136–145)
TOTAL PROTEIN: 7.1 G/DL (ref 6.4–8.2)
TRIGL SERPL-MCNC: 155 MG/DL (ref 0–150)
VITAMIN D 25-HYDROXY: 27.9 NG/ML
VLDLC SERPL CALC-MCNC: 31 MG/DL
WBC # BLD: 8.7 K/UL (ref 4–11)

## 2019-08-28 DIAGNOSIS — E78.2 MIXED HYPERLIPIDEMIA: ICD-10-CM

## 2019-08-28 DIAGNOSIS — E55.9 VITAMIN D DEFICIENCY: ICD-10-CM

## 2019-08-28 DIAGNOSIS — I10 ESSENTIAL HYPERTENSION: Primary | ICD-10-CM

## 2019-08-28 RX ORDER — LOSARTAN POTASSIUM AND HYDROCHLOROTHIAZIDE 25; 100 MG/1; MG/1
1 TABLET ORAL DAILY
Qty: 90 TABLET | Refills: 1 | Status: SHIPPED | OUTPATIENT
Start: 2019-08-28 | End: 2019-12-11 | Stop reason: CLARIF

## 2019-08-28 RX ORDER — ROSUVASTATIN CALCIUM 20 MG/1
20 TABLET, COATED ORAL NIGHTLY
Qty: 90 TABLET | Refills: 1 | Status: SHIPPED | OUTPATIENT
Start: 2019-08-28 | End: 2019-12-31

## 2019-08-28 RX ORDER — ERGOCALCIFEROL 1.25 MG/1
CAPSULE ORAL
Qty: 12 CAPSULE | Refills: 1 | Status: SHIPPED | OUTPATIENT
Start: 2019-08-28 | End: 2020-06-26

## 2019-08-30 NOTE — TELEPHONE ENCOUNTER
Gave pt results per results notes  She would like to know if she needs to be taking anything for her amenia? She also would like to know if she should have her B12 tested again? Pt states that she was put on Amox and is now having vaginal itching, Is there something she can take?

## 2019-08-31 RX ORDER — FLUCONAZOLE 150 MG/1
150 TABLET ORAL ONCE
Qty: 1 TABLET | Refills: 0 | Status: SHIPPED | OUTPATIENT
Start: 2019-08-31 | End: 2019-08-31

## 2019-11-11 ENCOUNTER — OFFICE VISIT (OUTPATIENT)
Dept: PRIMARY CARE CLINIC | Age: 69
End: 2019-11-11
Payer: MEDICARE

## 2019-11-11 VITALS
BODY MASS INDEX: 37.25 KG/M2 | SYSTOLIC BLOOD PRESSURE: 130 MMHG | RESPIRATION RATE: 14 BRPM | DIASTOLIC BLOOD PRESSURE: 70 MMHG | HEART RATE: 78 BPM | TEMPERATURE: 98.5 F | WEIGHT: 231.8 LBS | HEIGHT: 66 IN | OXYGEN SATURATION: 99 %

## 2019-11-11 DIAGNOSIS — N89.8 VAGINAL ITCHING: ICD-10-CM

## 2019-11-11 DIAGNOSIS — I10 ESSENTIAL HYPERTENSION: ICD-10-CM

## 2019-11-11 DIAGNOSIS — E55.9 VITAMIN D DEFICIENCY: ICD-10-CM

## 2019-11-11 DIAGNOSIS — E78.2 MIXED HYPERLIPIDEMIA: ICD-10-CM

## 2019-11-11 DIAGNOSIS — H26.9 CATARACT OF BOTH EYES, UNSPECIFIED CATARACT TYPE: ICD-10-CM

## 2019-11-11 DIAGNOSIS — Z01.818 PREOPERATIVE EXAMINATION: Primary | ICD-10-CM

## 2019-11-11 DIAGNOSIS — K21.9 LARYNGOPHARYNGEAL REFLUX DISEASE: ICD-10-CM

## 2019-11-11 DIAGNOSIS — E11.65 UNCONTROLLED TYPE 2 DIABETES MELLITUS WITH HYPERGLYCEMIA (HCC): ICD-10-CM

## 2019-11-11 DIAGNOSIS — N89.8 VAGINAL DISCHARGE: ICD-10-CM

## 2019-11-11 LAB — HBA1C MFR BLD: 9.5 %

## 2019-11-11 PROCEDURE — G8427 DOCREV CUR MEDS BY ELIG CLIN: HCPCS | Performed by: INTERNAL MEDICINE

## 2019-11-11 PROCEDURE — G8484 FLU IMMUNIZE NO ADMIN: HCPCS | Performed by: INTERNAL MEDICINE

## 2019-11-11 PROCEDURE — 99213 OFFICE O/P EST LOW 20 MIN: CPT | Performed by: INTERNAL MEDICINE

## 2019-11-11 PROCEDURE — 1090F PRES/ABSN URINE INCON ASSESS: CPT | Performed by: INTERNAL MEDICINE

## 2019-11-11 PROCEDURE — 2022F DILAT RTA XM EVC RTNOPTHY: CPT | Performed by: INTERNAL MEDICINE

## 2019-11-11 PROCEDURE — 83036 HEMOGLOBIN GLYCOSYLATED A1C: CPT | Performed by: INTERNAL MEDICINE

## 2019-11-11 PROCEDURE — G8417 CALC BMI ABV UP PARAM F/U: HCPCS | Performed by: INTERNAL MEDICINE

## 2019-11-11 RX ORDER — FLUCONAZOLE 150 MG/1
150 TABLET ORAL ONCE
Qty: 1 TABLET | Refills: 0 | Status: SHIPPED | OUTPATIENT
Start: 2019-11-11 | End: 2019-11-11

## 2019-11-11 ASSESSMENT — ENCOUNTER SYMPTOMS
SORE THROAT: 0
VOMITING: 0
SHORTNESS OF BREATH: 0
WHEEZING: 0
BLOOD IN STOOL: 0
NAUSEA: 0
RHINORRHEA: 0
DIARRHEA: 0
CONSTIPATION: 0
TROUBLE SWALLOWING: 0
CHEST TIGHTNESS: 0
ABDOMINAL PAIN: 0
SINUS PRESSURE: 0
COUGH: 0

## 2019-11-12 LAB
CANDIDA SPECIES, DNA PROBE: ABNORMAL
GARDNERELLA VAGINALIS, DNA PROBE: ABNORMAL
TRICHOMONAS VAGINALIS DNA: ABNORMAL

## 2019-11-13 ENCOUNTER — TELEPHONE (OUTPATIENT)
Dept: PRIMARY CARE CLINIC | Age: 69
End: 2019-11-13

## 2019-11-13 DIAGNOSIS — K21.9 LARYNGOPHARYNGEAL REFLUX DISEASE: ICD-10-CM

## 2019-11-13 PROBLEM — N89.8 VAGINAL DISCHARGE: Status: ACTIVE | Noted: 2019-11-13

## 2019-11-13 PROBLEM — H26.9 CATARACT OF BOTH EYES: Status: ACTIVE | Noted: 2019-11-13

## 2019-11-13 PROBLEM — N89.8 VAGINAL ITCHING: Status: ACTIVE | Noted: 2019-11-13

## 2019-11-13 PROBLEM — E55.9 VITAMIN D DEFICIENCY: Status: ACTIVE | Noted: 2019-11-13

## 2019-11-13 RX ORDER — FAMOTIDINE 20 MG/1
20 TABLET, FILM COATED ORAL 2 TIMES DAILY
Qty: 60 TABLET | Refills: 3 | Status: SHIPPED | OUTPATIENT
Start: 2019-11-13 | End: 2019-11-13 | Stop reason: SDUPTHER

## 2019-11-13 RX ORDER — FAMOTIDINE 20 MG/1
TABLET, FILM COATED ORAL
Qty: 180 TABLET | Refills: 0 | Status: SHIPPED | OUTPATIENT
Start: 2019-11-13 | End: 2020-07-28 | Stop reason: SDUPTHER

## 2019-12-04 RX ORDER — INSULIN GLARGINE 100 [IU]/ML
INJECTION, SOLUTION SUBCUTANEOUS
Qty: 40 ML | Refills: 0 | Status: SHIPPED | OUTPATIENT
Start: 2019-12-04 | End: 2019-12-30

## 2019-12-11 ENCOUNTER — OFFICE VISIT (OUTPATIENT)
Dept: PRIMARY CARE CLINIC | Age: 69
End: 2019-12-11
Payer: MEDICARE

## 2019-12-11 VITALS
OXYGEN SATURATION: 99 % | DIASTOLIC BLOOD PRESSURE: 70 MMHG | RESPIRATION RATE: 14 BRPM | SYSTOLIC BLOOD PRESSURE: 140 MMHG | HEART RATE: 88 BPM | BODY MASS INDEX: 37.67 KG/M2 | HEIGHT: 66 IN | WEIGHT: 234.4 LBS

## 2019-12-11 DIAGNOSIS — E11.65 UNCONTROLLED TYPE 2 DIABETES MELLITUS WITH HYPERGLYCEMIA (HCC): ICD-10-CM

## 2019-12-11 DIAGNOSIS — E11.9 TYPE 2 DIABETES MELLITUS WITHOUT COMPLICATION, WITHOUT LONG-TERM CURRENT USE OF INSULIN (HCC): ICD-10-CM

## 2019-12-11 DIAGNOSIS — I10 ESSENTIAL HYPERTENSION: ICD-10-CM

## 2019-12-11 DIAGNOSIS — E78.2 MIXED HYPERLIPIDEMIA: ICD-10-CM

## 2019-12-11 DIAGNOSIS — Z00.00 ROUTINE GENERAL MEDICAL EXAMINATION AT A HEALTH CARE FACILITY: Primary | ICD-10-CM

## 2019-12-11 PROBLEM — N89.8 VAGINAL ITCHING: Status: RESOLVED | Noted: 2019-11-13 | Resolved: 2019-12-11

## 2019-12-11 PROBLEM — N89.8 VAGINAL DISCHARGE: Status: RESOLVED | Noted: 2019-11-13 | Resolved: 2019-12-11

## 2019-12-11 PROCEDURE — 4040F PNEUMOC VAC/ADMIN/RCVD: CPT | Performed by: NURSE PRACTITIONER

## 2019-12-11 PROCEDURE — 3017F COLORECTAL CA SCREEN DOC REV: CPT | Performed by: NURSE PRACTITIONER

## 2019-12-11 PROCEDURE — G0438 PPPS, INITIAL VISIT: HCPCS | Performed by: NURSE PRACTITIONER

## 2019-12-11 PROCEDURE — 1123F ACP DISCUSS/DSCN MKR DOCD: CPT | Performed by: NURSE PRACTITIONER

## 2019-12-11 PROCEDURE — 3046F HEMOGLOBIN A1C LEVEL >9.0%: CPT | Performed by: NURSE PRACTITIONER

## 2019-12-11 PROCEDURE — G8482 FLU IMMUNIZE ORDER/ADMIN: HCPCS | Performed by: NURSE PRACTITIONER

## 2019-12-11 ASSESSMENT — LIFESTYLE VARIABLES: HOW OFTEN DO YOU HAVE A DRINK CONTAINING ALCOHOL: 0

## 2019-12-11 ASSESSMENT — PATIENT HEALTH QUESTIONNAIRE - PHQ9
SUM OF ALL RESPONSES TO PHQ QUESTIONS 1-9: 0
SUM OF ALL RESPONSES TO PHQ QUESTIONS 1-9: 0

## 2019-12-28 DIAGNOSIS — E78.2 MIXED HYPERLIPIDEMIA: ICD-10-CM

## 2019-12-28 DIAGNOSIS — I10 ESSENTIAL HYPERTENSION: Primary | ICD-10-CM

## 2019-12-30 RX ORDER — INSULIN GLARGINE 100 [IU]/ML
INJECTION, SOLUTION SUBCUTANEOUS
Qty: 40 ML | Refills: 3 | Status: SHIPPED | OUTPATIENT
Start: 2019-12-30 | End: 2020-05-18 | Stop reason: SDUPTHER

## 2019-12-31 RX ORDER — LOSARTAN POTASSIUM AND HYDROCHLOROTHIAZIDE 25; 100 MG/1; MG/1
TABLET ORAL
Qty: 90 TABLET | Refills: 1 | Status: SHIPPED | OUTPATIENT
Start: 2019-12-31 | End: 2020-08-31 | Stop reason: SDUPTHER

## 2019-12-31 RX ORDER — INSULIN LISPRO 100 [IU]/ML
INJECTION, SOLUTION INTRAVENOUS; SUBCUTANEOUS
Qty: 15 ML | Refills: 1 | Status: SHIPPED | OUTPATIENT
Start: 2019-12-31 | End: 2020-04-27 | Stop reason: SDUPTHER

## 2019-12-31 RX ORDER — ROSUVASTATIN CALCIUM 20 MG/1
TABLET, COATED ORAL
Qty: 90 TABLET | Refills: 1 | Status: SHIPPED | OUTPATIENT
Start: 2019-12-31 | End: 2020-08-31 | Stop reason: SDUPTHER

## 2020-01-27 ENCOUNTER — OFFICE VISIT (OUTPATIENT)
Dept: PRIMARY CARE CLINIC | Age: 70
End: 2020-01-27
Payer: MEDICARE

## 2020-01-27 VITALS
HEIGHT: 66 IN | WEIGHT: 236.4 LBS | HEART RATE: 79 BPM | BODY MASS INDEX: 37.99 KG/M2 | RESPIRATION RATE: 14 BRPM | DIASTOLIC BLOOD PRESSURE: 62 MMHG | SYSTOLIC BLOOD PRESSURE: 136 MMHG | OXYGEN SATURATION: 96 % | TEMPERATURE: 98.7 F

## 2020-01-27 PROCEDURE — 1036F TOBACCO NON-USER: CPT | Performed by: INTERNAL MEDICINE

## 2020-01-27 PROCEDURE — 99214 OFFICE O/P EST MOD 30 MIN: CPT | Performed by: INTERNAL MEDICINE

## 2020-01-27 PROCEDURE — G8482 FLU IMMUNIZE ORDER/ADMIN: HCPCS | Performed by: INTERNAL MEDICINE

## 2020-01-27 PROCEDURE — 3046F HEMOGLOBIN A1C LEVEL >9.0%: CPT | Performed by: INTERNAL MEDICINE

## 2020-01-27 PROCEDURE — G8427 DOCREV CUR MEDS BY ELIG CLIN: HCPCS | Performed by: INTERNAL MEDICINE

## 2020-01-27 PROCEDURE — 3017F COLORECTAL CA SCREEN DOC REV: CPT | Performed by: INTERNAL MEDICINE

## 2020-01-27 PROCEDURE — 1123F ACP DISCUSS/DSCN MKR DOCD: CPT | Performed by: INTERNAL MEDICINE

## 2020-01-27 PROCEDURE — 1090F PRES/ABSN URINE INCON ASSESS: CPT | Performed by: INTERNAL MEDICINE

## 2020-01-27 PROCEDURE — G8417 CALC BMI ABV UP PARAM F/U: HCPCS | Performed by: INTERNAL MEDICINE

## 2020-01-27 PROCEDURE — 2022F DILAT RTA XM EVC RTNOPTHY: CPT | Performed by: INTERNAL MEDICINE

## 2020-01-27 PROCEDURE — G8399 PT W/DXA RESULTS DOCUMENT: HCPCS | Performed by: INTERNAL MEDICINE

## 2020-01-27 PROCEDURE — 4040F PNEUMOC VAC/ADMIN/RCVD: CPT | Performed by: INTERNAL MEDICINE

## 2020-01-27 ASSESSMENT — ENCOUNTER SYMPTOMS
CHEST TIGHTNESS: 0
SHORTNESS OF BREATH: 0
COUGH: 0
SORE THROAT: 0
BLOOD IN STOOL: 0
RHINORRHEA: 0
TROUBLE SWALLOWING: 0
WHEEZING: 0
NAUSEA: 0
ABDOMINAL PAIN: 0
SINUS PRESSURE: 0
DIARRHEA: 0
SINUS PAIN: 0
VOMITING: 0

## 2020-01-27 ASSESSMENT — PATIENT HEALTH QUESTIONNAIRE - PHQ9
2. FEELING DOWN, DEPRESSED OR HOPELESS: 0
SUM OF ALL RESPONSES TO PHQ9 QUESTIONS 1 & 2: 0
SUM OF ALL RESPONSES TO PHQ QUESTIONS 1-9: 0
SUM OF ALL RESPONSES TO PHQ QUESTIONS 1-9: 0
1. LITTLE INTEREST OR PLEASURE IN DOING THINGS: 0

## 2020-01-28 PROBLEM — H25.9 AGE-RELATED CATARACT OF RIGHT EYE: Status: ACTIVE | Noted: 2020-01-28

## 2020-01-28 ASSESSMENT — ENCOUNTER SYMPTOMS: CONSTIPATION: 0

## 2020-04-23 DIAGNOSIS — I10 ESSENTIAL HYPERTENSION: ICD-10-CM

## 2020-04-23 DIAGNOSIS — E55.9 VITAMIN D DEFICIENCY: ICD-10-CM

## 2020-04-23 DIAGNOSIS — E78.2 MIXED HYPERLIPIDEMIA: ICD-10-CM

## 2020-04-23 DIAGNOSIS — E11.65 UNCONTROLLED TYPE 2 DIABETES MELLITUS WITH HYPERGLYCEMIA (HCC): ICD-10-CM

## 2020-04-23 LAB
A/G RATIO: 1.3 (ref 1.1–2.2)
ALBUMIN SERPL-MCNC: 3.9 G/DL (ref 3.4–5)
ALP BLD-CCNC: 102 U/L (ref 40–129)
ALT SERPL-CCNC: 22 U/L (ref 10–40)
ANION GAP SERPL CALCULATED.3IONS-SCNC: 14 MMOL/L (ref 3–16)
AST SERPL-CCNC: 23 U/L (ref 15–37)
BILIRUB SERPL-MCNC: 0.3 MG/DL (ref 0–1)
BUN BLDV-MCNC: 19 MG/DL (ref 7–20)
CALCIUM SERPL-MCNC: 9.2 MG/DL (ref 8.3–10.6)
CHLORIDE BLD-SCNC: 100 MMOL/L (ref 99–110)
CHOLESTEROL, TOTAL: 117 MG/DL (ref 0–199)
CO2: 30 MMOL/L (ref 21–32)
CREAT SERPL-MCNC: 1.1 MG/DL (ref 0.6–1.2)
GFR AFRICAN AMERICAN: 59
GFR NON-AFRICAN AMERICAN: 49
GLOBULIN: 3.1 G/DL
GLUCOSE BLD-MCNC: 77 MG/DL (ref 70–99)
HDLC SERPL-MCNC: 33 MG/DL (ref 40–60)
LDL CHOLESTEROL CALCULATED: 57 MG/DL
POTASSIUM SERPL-SCNC: 3.7 MMOL/L (ref 3.5–5.1)
SODIUM BLD-SCNC: 144 MMOL/L (ref 136–145)
TOTAL PROTEIN: 7 G/DL (ref 6.4–8.2)
TRIGL SERPL-MCNC: 133 MG/DL (ref 0–150)
VITAMIN D 25-HYDROXY: 29 NG/ML
VLDLC SERPL CALC-MCNC: 27 MG/DL

## 2020-04-27 ENCOUNTER — VIRTUAL VISIT (OUTPATIENT)
Dept: PRIMARY CARE CLINIC | Age: 70
End: 2020-04-27
Payer: MEDICARE

## 2020-04-27 PROCEDURE — 99214 OFFICE O/P EST MOD 30 MIN: CPT | Performed by: INTERNAL MEDICINE

## 2020-04-27 PROCEDURE — 1090F PRES/ABSN URINE INCON ASSESS: CPT | Performed by: INTERNAL MEDICINE

## 2020-04-27 PROCEDURE — 3046F HEMOGLOBIN A1C LEVEL >9.0%: CPT | Performed by: INTERNAL MEDICINE

## 2020-04-27 PROCEDURE — 2022F DILAT RTA XM EVC RTNOPTHY: CPT | Performed by: INTERNAL MEDICINE

## 2020-04-27 PROCEDURE — 3017F COLORECTAL CA SCREEN DOC REV: CPT | Performed by: INTERNAL MEDICINE

## 2020-04-27 PROCEDURE — G8427 DOCREV CUR MEDS BY ELIG CLIN: HCPCS | Performed by: INTERNAL MEDICINE

## 2020-04-27 PROCEDURE — 1123F ACP DISCUSS/DSCN MKR DOCD: CPT | Performed by: INTERNAL MEDICINE

## 2020-04-27 PROCEDURE — 0509F URINE INCON PLAN DOCD: CPT | Performed by: INTERNAL MEDICINE

## 2020-04-27 PROCEDURE — 4040F PNEUMOC VAC/ADMIN/RCVD: CPT | Performed by: INTERNAL MEDICINE

## 2020-04-27 PROCEDURE — G8399 PT W/DXA RESULTS DOCUMENT: HCPCS | Performed by: INTERNAL MEDICINE

## 2020-04-27 RX ORDER — INSULIN LISPRO 100 [IU]/ML
10 INJECTION, SOLUTION INTRAVENOUS; SUBCUTANEOUS
Qty: 30 ML | Refills: 3 | Status: SHIPPED | OUTPATIENT
Start: 2020-04-27 | End: 2020-08-14 | Stop reason: SDUPTHER

## 2020-04-27 RX ORDER — CALCIUM CARB/VITAMIN D3/VIT K1 500-100-40
TABLET,CHEWABLE ORAL
Qty: 200 EACH | Refills: 2 | Status: SHIPPED | OUTPATIENT
Start: 2020-04-27 | End: 2020-08-14 | Stop reason: SDUPTHER

## 2020-04-27 ASSESSMENT — PATIENT HEALTH QUESTIONNAIRE - PHQ9
1. LITTLE INTEREST OR PLEASURE IN DOING THINGS: 0
SUM OF ALL RESPONSES TO PHQ QUESTIONS 1-9: 0
SUM OF ALL RESPONSES TO PHQ9 QUESTIONS 1 & 2: 0
2. FEELING DOWN, DEPRESSED OR HOPELESS: 0
SUM OF ALL RESPONSES TO PHQ QUESTIONS 1-9: 0

## 2020-04-27 ASSESSMENT — ENCOUNTER SYMPTOMS
CHEST TIGHTNESS: 0
RHINORRHEA: 0
SHORTNESS OF BREATH: 0
DIARRHEA: 1
VOMITING: 0
WHEEZING: 0
COUGH: 0
BLOOD IN STOOL: 0
CONSTIPATION: 0
NAUSEA: 0
SINUS PRESSURE: 0
ABDOMINAL PAIN: 0
SORE THROAT: 0

## 2020-04-27 NOTE — PROGRESS NOTES
2020    TELEHEALTH EVALUATION -- Audio/Visual (During MPRIO-62 public health emergency)    Chief Complaint   Patient presents with    Diabetes     bloos sugar  fasting 109    Hyperlipidemia    Hypertension    Other     laryngopharyngeal reflux and vitamin D deficiency       HPI:    Mati Otoole (:  1950) has requested an audio/video evaluation for the following concern(s):    Diabetes Mellitus Type 2:   Current Medications: Lantus 55 units twice daily patient did not increase as prescribed by Endocrinology, Trulicity 0.75mg once a week, and Humalog sliding scale 5- 10 units with meals  Diet: Patient states she decreases carbohydrates sometimes  Home blood sugar records: patient tests 1 time(s) per day fasting and it averages   Any episodes of hypoglycemia? no  Eye exam current (within one year): yes on 20  Current exercise: none     Hypertension:    Current Medications: Losartan-HCTZ 100-25mg po q day  Home blood pressure monitoring: No.    Patient doesn't add salt   Antihypertensive medication side effects: no medication side effects noted.       Hyperlipidemia:  Current Medication: Rosuvastatin 20mg po q day   No side effects    Diet: Patient tries to decrease fat and cholesterol     Laryngopharyngeal reflux:  Current medication: Famotidine 20mg po q day  Pt has reflux. Pt decrease caffeine. Pt avoids spicy foods. Pt states she eats tomato based foods but not often. Pt states she stopped drinking Pepsi     Vitamin D deficiency:  Pt takes Vitamin D 50,000 IU po q week. Urine incontinence:  Pt states she is starting to leak urine with coughing hard or getting up off a low couch. Review of Systems   Constitutional: Negative for chills, fatigue and fever. HENT: Negative for congestion, postnasal drip, rhinorrhea, sinus pressure and sore throat. Eyes: Negative for visual disturbance. Respiratory: Negative for cough, chest tightness, shortness of breath and wheezing. Cardiovascular: Negative for chest pain, palpitations and leg swelling. Gastrointestinal: Positive for diarrhea (after eating broccoli). Negative for abdominal pain, blood in stool, constipation, nausea and vomiting. Genitourinary: Negative for dysuria, frequency and hematuria. Musculoskeletal: Negative for arthralgias and myalgias. Skin: Negative for rash. Neurological: Negative for dizziness, tremors, syncope, weakness, light-headedness, numbness and headaches. Psychiatric/Behavioral: Negative for dysphoric mood and sleep disturbance. The patient is not nervous/anxious. Prior to Visit Medications    Medication Sig Taking?  Authorizing Provider   HUMALOG KWIKPEN 100 UNIT/ML SOPN INJECT 4 UNITS INTO THE SKIN FOUR TIMES DAILY Yes Merly Barth MD   rosuvastatin (CRESTOR) 20 MG tablet TAKE 1 TABLET BY MOUTH EVERY NIGHT Yes Merly Barth MD   losartan-hydrochlorothiazide (HYZAAR) 100-25 MG per tablet TAKE 1 TABLET BY MOUTH EVERY DAY Yes Merly Barth MD   Dulaglutide (TRULICITY) 1.30 LE/3.3KL SOPN INJECT 1 SYRINGE UNDER THE SKIN ONCE A WEEK Yes Isela Galvez MD   LANTUS 100 UNIT/ML injection vial ADMINISTER 55 UNITS UNDER THE SKIN TWICE DAILY Yes Isela Galvez MD   famotidine (PEPCID) 20 MG tablet TAKE 1 TABLET BY MOUTH TWICE DAILY Yes Merly Barth MD   vitamin D (ERGOCALCIFEROL) 06888 units CAPS capsule TAKE 1 CAPSULE BY MOUTH EVERY WEEK Yes Merly Barth MD   UNKNOWN TO PATIENT Yellow and orange capped eye drops for BP in eye Yes Historical Provider, MD   Insulin Pen Needle (B-D UF III MINI PEN NEEDLES) 31G X 5 MM MISC 1 each by Does not apply route 2 times daily Yes Yosef Campo MD   brompheniramine-pseudoephedrine-DM (BROMFED DM) 2-30-10 MG/5ML syrup Take 5 mLs by mouth 4 times daily as needed for Congestion or Cough Yes MD Eduardo Acosta Yes Melodie Carrel, MD   Insulin Syringe-Needle

## 2020-04-28 PROBLEM — N39.3 URINARY, INCONTINENCE, STRESS FEMALE: Status: ACTIVE | Noted: 2020-04-28

## 2020-05-14 RX ORDER — DULAGLUTIDE 0.75 MG/.5ML
INJECTION, SOLUTION SUBCUTANEOUS
Qty: 2 ML | Refills: 0 | Status: SHIPPED | OUTPATIENT
Start: 2020-05-14 | End: 2020-06-17

## 2020-05-18 RX ORDER — INSULIN GLARGINE 100 [IU]/ML
INJECTION, SOLUTION SUBCUTANEOUS
Qty: 40 ML | Refills: 3 | Status: SHIPPED | OUTPATIENT
Start: 2020-05-18 | End: 2020-09-15

## 2020-06-17 RX ORDER — DULAGLUTIDE 0.75 MG/.5ML
INJECTION, SOLUTION SUBCUTANEOUS
Qty: 2 ML | Refills: 0 | Status: SHIPPED | OUTPATIENT
Start: 2020-06-17 | End: 2020-07-23

## 2020-06-26 RX ORDER — ERGOCALCIFEROL 1.25 MG/1
CAPSULE ORAL
Qty: 12 CAPSULE | Refills: 1 | Status: SHIPPED | OUTPATIENT
Start: 2020-06-26 | End: 2021-01-07

## 2020-07-15 ENCOUNTER — TELEPHONE (OUTPATIENT)
Dept: PRIMARY CARE CLINIC | Age: 70
End: 2020-07-15

## 2020-07-15 NOTE — TELEPHONE ENCOUNTER
Patient is needing a pre op appointment    Surgery date: 8/6/20    Surgery Type/ Body Part: Right eye surgery    Surgeon Name : Dr. Levada Eisenmenger    Location for Surgery (what hospital/ office) : [facility]    EKG Needed: Do not know

## 2020-07-23 RX ORDER — DULAGLUTIDE 0.75 MG/.5ML
INJECTION, SOLUTION SUBCUTANEOUS
Qty: 2 ML | Refills: 0 | Status: SHIPPED | OUTPATIENT
Start: 2020-07-23 | End: 2020-07-28 | Stop reason: DRUGHIGH

## 2020-07-24 LAB
ESTIMATED AVERAGE GLUCOSE: 240.3 MG/DL
HBA1C MFR BLD: 10 %

## 2020-07-28 ENCOUNTER — OFFICE VISIT (OUTPATIENT)
Dept: PRIMARY CARE CLINIC | Age: 70
End: 2020-07-28
Payer: MEDICARE

## 2020-07-28 VITALS
OXYGEN SATURATION: 95 % | HEIGHT: 66 IN | SYSTOLIC BLOOD PRESSURE: 134 MMHG | WEIGHT: 239.8 LBS | RESPIRATION RATE: 18 BRPM | HEART RATE: 89 BPM | TEMPERATURE: 97.5 F | DIASTOLIC BLOOD PRESSURE: 68 MMHG | BODY MASS INDEX: 38.54 KG/M2

## 2020-07-28 LAB
CREATININE URINE: 108.7 MG/DL (ref 28–259)
MICROALBUMIN UR-MCNC: 13.3 MG/DL
MICROALBUMIN/CREAT UR-RTO: 122.4 MG/G (ref 0–30)

## 2020-07-28 PROCEDURE — 2022F DILAT RTA XM EVC RTNOPTHY: CPT | Performed by: INTERNAL MEDICINE

## 2020-07-28 PROCEDURE — 1036F TOBACCO NON-USER: CPT | Performed by: INTERNAL MEDICINE

## 2020-07-28 PROCEDURE — 3046F HEMOGLOBIN A1C LEVEL >9.0%: CPT | Performed by: INTERNAL MEDICINE

## 2020-07-28 PROCEDURE — 4040F PNEUMOC VAC/ADMIN/RCVD: CPT | Performed by: INTERNAL MEDICINE

## 2020-07-28 PROCEDURE — G8427 DOCREV CUR MEDS BY ELIG CLIN: HCPCS | Performed by: INTERNAL MEDICINE

## 2020-07-28 PROCEDURE — G8399 PT W/DXA RESULTS DOCUMENT: HCPCS | Performed by: INTERNAL MEDICINE

## 2020-07-28 PROCEDURE — 1123F ACP DISCUSS/DSCN MKR DOCD: CPT | Performed by: INTERNAL MEDICINE

## 2020-07-28 PROCEDURE — 1090F PRES/ABSN URINE INCON ASSESS: CPT | Performed by: INTERNAL MEDICINE

## 2020-07-28 PROCEDURE — 3017F COLORECTAL CA SCREEN DOC REV: CPT | Performed by: INTERNAL MEDICINE

## 2020-07-28 PROCEDURE — 99214 OFFICE O/P EST MOD 30 MIN: CPT | Performed by: INTERNAL MEDICINE

## 2020-07-28 PROCEDURE — G8417 CALC BMI ABV UP PARAM F/U: HCPCS | Performed by: INTERNAL MEDICINE

## 2020-07-28 RX ORDER — FAMOTIDINE 20 MG/1
20 TABLET, FILM COATED ORAL DAILY
Qty: 90 TABLET | Refills: 1 | Status: SHIPPED | OUTPATIENT
Start: 2020-07-28 | End: 2021-01-14

## 2020-07-28 ASSESSMENT — ENCOUNTER SYMPTOMS
SHORTNESS OF BREATH: 0
ABDOMINAL PAIN: 0
VOMITING: 0
COUGH: 0
EYE REDNESS: 1
RHINORRHEA: 0
NAUSEA: 0
CHEST TIGHTNESS: 0
EYE ITCHING: 1
SORE THROAT: 0
BLOOD IN STOOL: 0
TROUBLE SWALLOWING: 0
WHEEZING: 0
DIARRHEA: 0
SINUS PRESSURE: 0

## 2020-07-28 NOTE — PATIENT INSTRUCTIONS
-Continue same medications  -Increase Trulicity to 6.6GZ/2.8DH subcutaneously once a week  -Avoid anti-inflammatories, aspirin, and fish oil for 7 days prior to surgery  -Tylenol 650mg 3 times daily if needed for pain  -Nothing to eat or drink after midnight prior to surgery  -Follow up with Endocrinology

## 2020-07-28 NOTE — PROGRESS NOTES
Kwabena Vazquez   YOB: 1950    Date of Visit:  7/28/2020    Chief Complaint   Patient presents with    Pre-op Exam     8-6-20, dx: cataract senile, cataract lens implant phaco right eye, Kae Fitzpatrick MD, [phone]       HPI  Pt presents for a preoperative history and physical exam. Pt is scheduled for a Phacoemulsification and cataract Lens implant right eye to be done on 8/6/20 by Dr. Kae Fitzpatrick for senile cataract.     Diabetes Mellitus Type 2:   Current QQOOWNTLCAF: MHRXDU 04 XALQL bid, Trulicity 0.75mg q week, and Humalog sliding scale before meals.   Diet: Patient decreases carbohydrates sometimes  Home blood sugar records: patient tests 1 time(s) per day fasting and it averages   Any episodes of hypoglycemia? no  Eye exam current (within one year): no  Current exercise: none     Hypertension:    Current Medications: Losartan-HCTZ 100-25mg po q day  Diet: low salt  Current exercise: none     Hyperlipidemia:  Current Medication: Rosuvastatin 20mg po q day   Diet: Patient tries to decrease fat and cholesterol  Current exercise: none     Laryngopharyngeal reflux:  Current medication: Famotidine 20 mg po q day  Pt denies heartburn and reflux. Pt decrease caffeine, spicy foods, and tomato based foods.     Vitamin D deficiency:  Current Medication: Vitamin D 50,000 IU po q week      Review of Systems   Constitutional: Negative for appetite change, chills, fatigue and fever. HENT: Negative for congestion, ear pain, postnasal drip, rhinorrhea, sinus pressure, sneezing, sore throat and trouble swallowing. Eyes: Positive for redness, itching and visual disturbance. Negative for photophobia. Pt is seeing Ophthalmology   Respiratory: Negative for cough, chest tightness, shortness of breath and wheezing. Cardiovascular: Negative for chest pain, palpitations and leg swelling.    Gastrointestinal: Negative for abdominal pain, blood in stool, constipation, diarrhea, nausea and UNKNOWN TO PATIENT Yellow and orange capped eye drops for BP in eye      Insulin Pen Needle (B-D UF III MINI PEN NEEDLES) 31G X 5 MM MISC 1 each by Does not apply route 2 times daily 100 each 6    brompheniramine-pseudoephedrine-DM (BROMFED DM) 2-30-10 MG/5ML syrup Take 5 mLs by mouth 4 times daily as needed for Congestion or Cough 120 mL 0    ACCU-CHEK SOFTCLIX LANCETS MISC USE FOUR TIMES DAILY 300 each 6    blood glucose test strips (ACCU-CHEK ANJU PLUS) strip TEST BLOOD SUGAR THREE TIMES DAILY 300 strip 2    hydrocortisone 2.5 % cream APPLY EXTERNALLY TO THE AFFECTED AREA TWICE DAILY 30 g 0    OMEGA-3 FATTY ACIDS PO Omega-3 Fatty Acids Oral orallyActive      Blood Glucose Monitoring Suppl (ACCU-CHEK ANJU PLUS) W/DEVICE KIT Use to monitor blood sugars 1 kit 0    ibuprofen (ADVIL;MOTRIN) 800 MG tablet Take 1 tablet by mouth 3 times daily as needed for Pain 60 tablet 0         Allergies   Allergen Reactions    Januvia [Sitagliptin Phosphate] Swelling     Side effects    Actos [Pioglitazone] Diarrhea    Avandia [Rosiglitazone] Diarrhea and Nausea Only    Dye [Iodides] Rash    Jardiance [Empagliflozin] Diarrhea    Metformin Diarrhea     Nausea         Social History     Tobacco Use    Smoking status: Never Smoker    Smokeless tobacco: Never Used   Substance Use Topics    Alcohol use: No       Family History   Problem Relation Age of Onset    Diabetes Sister     Heart Disease Sister 77    Diabetes Brother         had amputation    High Blood Pressure Brother     Cancer Father        Immunization History   Administered Date(s) Administered    Influenza Vaccine, unspecified formulation 10/02/2015, 12/01/2016    Influenza, High Dose (Fluzone 65 yrs and older) 10/02/2015, 12/01/2016, 12/18/2018, 12/10/2019    Pneumococcal Conjugate 13-valent ([id number]) 12/01/2016    Pneumococcal Polysaccharide ([id number]) 07/10/2015    Tdap (Boostrix, Adacel) 08/18/2010       Vitals:    07/28/20 1404   BP: 134/68   Pulse: 89   Resp: 18   Temp: 97.5 °F (36.4 °C)   SpO2: 95%   Weight: 239 lb 12.8 oz (108.8 kg)   Height: 5' 6\" (1.676 m)     Body mass index is 38.7 kg/m². Physical Exam  Nursing note reviewed. Constitutional:       General: She is not in acute distress. Appearance: Normal appearance. She is well-developed. HENT:      Head: Normocephalic and atraumatic. Right Ear: Tympanic membrane and ear canal normal.      Left Ear: Tympanic membrane and ear canal normal.      Mouth/Throat:      Pharynx: Oropharynx is clear. Eyes:      General: Lids are normal.      Extraocular Movements: Extraocular movements intact. Conjunctiva/sclera: Conjunctivae normal.      Pupils: Pupils are equal, round, and reactive to light. Neck:      Musculoskeletal: Neck supple. Thyroid: No thyromegaly. Vascular: No carotid bruit. Cardiovascular:      Rate and Rhythm: Normal rate and regular rhythm. Heart sounds: Normal heart sounds, S1 normal and S2 normal. No murmur. No friction rub. No gallop. Pulmonary:      Effort: Pulmonary effort is normal. No respiratory distress. Breath sounds: Normal breath sounds. No wheezing, rhonchi or rales. Abdominal:      General: Bowel sounds are normal. There is no distension. Palpations: Abdomen is soft. Tenderness: There is no abdominal tenderness. There is no rebound. Musculoskeletal:      Right lower leg: No edema. Left lower leg: No edema. Lymphadenopathy:      Head:      Right side of head: No submandibular adenopathy. Left side of head: No submandibular adenopathy. Skin:     Comments: No foot ulcers   Neurological:      Mental Status: She is alert and oriented to person, place, and time. Cranial Nerves: No cranial nerve deficit. Deep Tendon Reflexes: Reflexes are normal and symmetric.       Comments: Bilateral foot monofilament exam normal   Psychiatric:         Mood and Affect: Mood normal.           Lab Review Orders Only on 07/24/2020   Component Date Value    Hemoglobin A1C 07/24/2020 10.0     eAG 07/24/2020 240.3    Orders Only on 04/23/2020   Component Date Value    Vit D, 25-Hydroxy 04/23/2020 29.0*    Cholesterol, Total 04/23/2020 117     Triglycerides 04/23/2020 133     HDL 04/23/2020 33*    LDL Calculated 04/23/2020 57     VLDL Cholesterol Calcula* 04/23/2020 27     Sodium 04/23/2020 144     Potassium 04/23/2020 3.7     Chloride 04/23/2020 100     CO2 04/23/2020 30     Anion Gap 04/23/2020 14     Glucose 04/23/2020 77     BUN 04/23/2020 19     CREATININE 04/23/2020 1.1     GFR Non- 04/23/2020 49*    GFR  04/23/2020 59*    Calcium 04/23/2020 9.2     Total Protein 04/23/2020 7.0     Alb 04/23/2020 3.9     Albumin/Globulin Ratio 04/23/2020 1.3     Total Bilirubin 04/23/2020 0.3     Alkaline Phosphatase 04/23/2020 102     ALT 04/23/2020 22     AST 04/23/2020 23     Globulin 04/23/2020 3.1        No results found for this visit on 07/28/20. Assessment/Plan     1. Senile cataract of right eye, unspecified age-related cataract type  -Preoperative history and physical done   -Patient is in satisfactory condition for a Phacoemulsification and cataract Lens implant right eye to be done on 8/6/20 by Dr. Marcos Anne    2. Preoperative examination  -Preoperative history and physical done   -Patient is in satisfactory condition for a Phacoemulsification and cataract Lens implant right eye to be done on 8/6/20 by Dr. Tiff Ball same medications  -Avoid anti-inflammatories, aspirin, and fish oil for 7 days prior to surgery  -Tylenol 650mg 3 times daily if needed for pain  -Nothing to eat or drink after midnight prior to surgery    3.  Uncontrolled type 2 diabetes mellitus with complication, with long-term current use of insulin (HCC)  - Hemoglobin A1c of 10.0% shows diabetes is uncontrolled  - Continue same medications  - Increase Dulaglutide 1.5 MG/0.5ML SOPN; Inject 1.5 mg into the skin once a week  Dispense: 2 mL; Refill: 5  -Limit carbohydrates to 45 grams with meals and 15 grams with snacks  -monitor blood sugars  -Regular aerobic exercise  - Microalbumin / Creatinine Urine Ratio  -  DIABETES FOOT EXAM  -Follow up with Endocrinology    4. Essential hypertension  -stable  -Continue same medications  -Low sodium diet  -Regular aerobic exercise    5. Mixed hyperlipidemia  -Continue same medications  -Low fat, low cholesterol diet  -Regular aerobic exercise    6. Laryngopharyngeal reflux disease  -stable  -Continue same medications  - famotidine (PEPCID) 20 MG tablet; Take 1 tablet by mouth daily  Dispense: 90 tablet; Refill: 1  -Decrease caffeine, avoid spicy foods, avoid tomato based foods  -Eat small meals instead of large meals  -Wait 2-3 hours after eating before lying down    7. Vitamin D deficiency  -Continue same medications      Discussed medications with patient, who voiced understanding of their use and indications. All questions answered. Return in about 3 months (around 10/28/2020) for diabetes.

## 2020-07-30 ENCOUNTER — OFFICE VISIT (OUTPATIENT)
Dept: ENDOCRINOLOGY | Age: 70
End: 2020-07-30
Payer: MEDICARE

## 2020-07-30 VITALS
BODY MASS INDEX: 32.37 KG/M2 | OXYGEN SATURATION: 98 % | WEIGHT: 239 LBS | HEART RATE: 88 BPM | RESPIRATION RATE: 14 BRPM | HEIGHT: 72 IN | SYSTOLIC BLOOD PRESSURE: 155 MMHG | DIASTOLIC BLOOD PRESSURE: 74 MMHG

## 2020-07-30 PROCEDURE — G8399 PT W/DXA RESULTS DOCUMENT: HCPCS | Performed by: INTERNAL MEDICINE

## 2020-07-30 PROCEDURE — 1036F TOBACCO NON-USER: CPT | Performed by: INTERNAL MEDICINE

## 2020-07-30 PROCEDURE — 1123F ACP DISCUSS/DSCN MKR DOCD: CPT | Performed by: INTERNAL MEDICINE

## 2020-07-30 PROCEDURE — G8427 DOCREV CUR MEDS BY ELIG CLIN: HCPCS | Performed by: INTERNAL MEDICINE

## 2020-07-30 PROCEDURE — 99214 OFFICE O/P EST MOD 30 MIN: CPT | Performed by: INTERNAL MEDICINE

## 2020-07-30 PROCEDURE — 3017F COLORECTAL CA SCREEN DOC REV: CPT | Performed by: INTERNAL MEDICINE

## 2020-07-30 PROCEDURE — 1090F PRES/ABSN URINE INCON ASSESS: CPT | Performed by: INTERNAL MEDICINE

## 2020-07-30 PROCEDURE — 3046F HEMOGLOBIN A1C LEVEL >9.0%: CPT | Performed by: INTERNAL MEDICINE

## 2020-07-30 PROCEDURE — 2022F DILAT RTA XM EVC RTNOPTHY: CPT | Performed by: INTERNAL MEDICINE

## 2020-07-30 PROCEDURE — G8417 CALC BMI ABV UP PARAM F/U: HCPCS | Performed by: INTERNAL MEDICINE

## 2020-07-30 PROCEDURE — 4040F PNEUMOC VAC/ADMIN/RCVD: CPT | Performed by: INTERNAL MEDICINE

## 2020-07-30 ASSESSMENT — ENCOUNTER SYMPTOMS
PHOTOPHOBIA: 0
ABDOMINAL PAIN: 0
BLURRED VISION: 0
NAUSEA: 0
DOUBLE VISION: 0
BACK PAIN: 0
HEARTBURN: 0
DIARRHEA: 0
ORTHOPNEA: 0
PHOTOPHOBIA: 0
CONSTIPATION: 0
CONSTIPATION: 0
HEMOPTYSIS: 0
COUGH: 0

## 2020-07-30 NOTE — PROGRESS NOTES
hydrocortisone 2.5 % cream APPLY EXTERNALLY TO THE AFFECTED AREA TWICE DAILY 30 g 0    OMEGA-3 FATTY ACIDS PO Omega-3 Fatty Acids Oral orallyActive      Blood Glucose Monitoring Suppl (ACCU-CHEK ANJU PLUS) W/DEVICE KIT Use to monitor blood sugars 1 kit 0    ibuprofen (ADVIL;MOTRIN) 800 MG tablet Take 1 tablet by mouth 3 times daily as needed for Pain 60 tablet 0         Vitals:    20 1030   BP: (!) 164/83   Pulse: 88   Resp: 14   SpO2: 98%   Weight: 239 lb (108.4 kg)   Height: 6' (1.829 m)     Body mass index is 32.41 kg/m². Wt Readings from Last 3 Encounters:   20 239 lb (108.4 kg)   20 239 lb 12.8 oz (108.8 kg)   20 236 lb 6.4 oz (107.2 kg)     BP Readings from Last 3 Encounters:   20 (!) 164/83   20 134/68   20 136/62        Past Medical History:   Diagnosis Date    Dizziness     Hearing loss     Hyperlipidemia     Hypertension     Laryngopharyngeal reflux disease 2019    Tinnitus     Type II or unspecified type diabetes mellitus without mention of complication, not stated as uncontrolled     Urinary, incontinence, stress female 2020    Vitamin D deficiency 2019     Past Surgical History:   Procedure Laterality Date     SECTION      x 1     TONSILLECTOMY       Family History   Problem Relation Age of Onset    Diabetes Sister     Heart Disease Sister 77    Diabetes Brother         had amputation    High Blood Pressure Brother     Cancer Father      Social History     Tobacco Use   Smoking Status Never Smoker   Smokeless Tobacco Never Used      Social History     Substance and Sexual Activity   Alcohol Use No       HPI      Rashard Huntley is a 79 y.o. female who is here for management of uncontrolled DM. PCP :  Jeison Ty MD    Patient has a PMH of Type 2 DM, hypertension, hyperlipidemia, obesity     Diagnosed with Diabetes Mellitus type 2 > 10 yrs,  Course has been variable .   She has been non-compliant as she mg/dL Final    Microalbumin Creatinine Ratio 07/28/2020 122.4* 0.0 - 30.0 mg/g Final   Orders Only on 07/24/2020   Component Date Value Ref Range Status    Hemoglobin A1C 07/24/2020 10.0  See comment % Final    Comment: Comment:  Diagnosis of Diabetes: > or = 6.5%  Increased risk of diabetes (Prediabetes): 5.7-6.4%  Glycemic Control: Nonpregnant Adults: <7.0%                    Pregnant: <6.0%        eAG 07/24/2020 240.3  mg/dL Final   Orders Only on 04/23/2020   Component Date Value Ref Range Status    Vit D, 25-Hydroxy 04/23/2020 29.0* >=30 ng/mL Final    Comment: <=20 ng/mL. ........... Travis Kirks Deficient  21-29 ng/mL. ......... Travis Kirks Insufficient  >=30 ng/mL. ........ Travis Kirks Sufficient      Cholesterol, Total 04/23/2020 117  0 - 199 mg/dL Final    Triglycerides 04/23/2020 133  0 - 150 mg/dL Final    HDL 04/23/2020 33* 40 - 60 mg/dL Final    LDL Calculated 04/23/2020 57  <100 mg/dL Final    VLDL Cholesterol Calculated 04/23/2020 27  Not Established mg/dL Final    Sodium 04/23/2020 144  136 - 145 mmol/L Final    Potassium 04/23/2020 3.7  3.5 - 5.1 mmol/L Final    Chloride 04/23/2020 100  99 - 110 mmol/L Final    CO2 04/23/2020 30  21 - 32 mmol/L Final    Anion Gap 04/23/2020 14  3 - 16 Final    Glucose 04/23/2020 77  70 - 99 mg/dL Final    BUN 04/23/2020 19  7 - 20 mg/dL Final    CREATININE 04/23/2020 1.1  0.6 - 1.2 mg/dL Final    GFR Non- 04/23/2020 49* >60 Final    Comment: >60 mL/min/1.73m2 EGFR, calc. for ages 25 and older using the  MDRD formula (not corrected for weight), is valid for stable  renal function.  GFR  04/23/2020 59* >60 Final    Comment: Chronic Kidney Disease: less than 60 ml/min/1.73 sq.m. Kidney Failure: less than 15 ml/min/1.73 sq.m. Results valid for patients 18 years and older.       Calcium 04/23/2020 9.2  8.3 - 10.6 mg/dL Final    Total Protein 04/23/2020 7.0  6.4 - 8.2 g/dL Final    Alb 04/23/2020 3.9  3.4 - 5.0 g/dL Final    Albumin/Globulin

## 2020-08-14 ENCOUNTER — TELEPHONE (OUTPATIENT)
Dept: ENDOCRINOLOGY | Age: 70
End: 2020-08-14

## 2020-08-14 RX ORDER — BLOOD SUGAR DIAGNOSTIC
STRIP MISCELLANEOUS
Qty: 300 STRIP | Refills: 2 | Status: SHIPPED | OUTPATIENT
Start: 2020-08-14 | End: 2021-05-17 | Stop reason: SDUPTHER

## 2020-08-14 RX ORDER — CALCIUM CARB/VITAMIN D3/VIT K1 500-100-40
TABLET,CHEWABLE ORAL
Qty: 200 EACH | Refills: 2 | Status: SHIPPED | OUTPATIENT
Start: 2020-08-14 | End: 2022-01-31 | Stop reason: SDUPTHER

## 2020-08-14 RX ORDER — INSULIN LISPRO 100 [IU]/ML
INJECTION, SOLUTION INTRAVENOUS; SUBCUTANEOUS
Qty: 30 ML | Refills: 3 | Status: SHIPPED | OUTPATIENT
Start: 2020-08-14 | End: 2020-11-17 | Stop reason: SDUPTHER

## 2020-08-28 NOTE — TELEPHONE ENCOUNTER
Pt needs a refill of her Ibuprofen 800 mg for her back and legs. She has neuropathy. She has not needed to have this filled for awhile. Now she is on her last two pills.     LOV 7/28/20

## 2020-08-28 NOTE — TELEPHONE ENCOUNTER
Medication:   Requested Prescriptions     Pending Prescriptions Disp Refills    ibuprofen (ADVIL;MOTRIN) 800 MG tablet 60 tablet 0     Sig: Take 1 tablet by mouth 3 times daily as needed for Pain       Last Filled:  1/5/17    Patient Phone Number: [phone] (home)     Last appt: 7/28/2020   Next appt: 9/15/2020    Last BMP:   Lab Results   Component Value Date     04/23/2020    K 3.7 04/23/2020     04/23/2020    CO2 30 04/23/2020    ANIONGAP 14 04/23/2020    GLUCOSE 77 04/23/2020    BUN 19 04/23/2020    CREATININE 1.1 04/23/2020    LABGLOM 49 04/23/2020    GFRAA 59 04/23/2020    GFRAA >60 04/04/2013    CALCIUM 9.2 04/23/2020      Last CMP:   Lab Results   Component Value Date     04/23/2020    K 3.7 04/23/2020     04/23/2020    CO2 30 04/23/2020    ANIONGAP 14 04/23/2020    GLUCOSE 77 04/23/2020    BUN 19 04/23/2020    CREATININE 1.1 04/23/2020    LABGLOM 49 04/23/2020    GFRAA 59 04/23/2020    GFRAA >60 04/04/2013    PROT 7.0 04/23/2020    PROT 7.1 01/23/2012    LABALBU 3.9 04/23/2020    AGRATIO 1.3 04/23/2020    BILITOT 0.3 04/23/2020    ALKPHOS 102 04/23/2020    ALT 22 04/23/2020    AST 23 04/23/2020    GLOB 3.1 04/23/2020     Last Renal Function:   Lab Results   Component Value Date     04/23/2020    K 3.7 04/23/2020     04/23/2020    CO2 30 04/23/2020    GLUCOSE 77 04/23/2020    BUN 19 04/23/2020    CREATININE 1.1 04/23/2020    LABALBU 3.9 04/23/2020    CALCIUM 9.2 04/23/2020    GFR 60 04/04/2013    GFRAA 59 04/23/2020    GFRAA >60 04/04/2013     Last Labs DM:   Lab Results   Component Value Date    LABA1C 10.0 07/24/2020     Last Lipid:   Lab Results   Component Value Date    CHOL 117 04/23/2020    TRIG 133 04/23/2020    HDL 33 04/23/2020    HDL 43 01/23/2012    LDLCALC 57 04/23/2020     Last PSA: No results found for: PSA  Last Thyroid:   Lab Results   Component Value Date    TSH 1.01 08/27/2018       Last OARRS: No flowsheet data found.     Preferred Pharmacy: [facility] [address] [facility], [address] [place] [address] Drive  [address] [facility] [address] [facility] [address] [address]  Phone: [phone] Fax: [phone]

## 2020-08-29 RX ORDER — IBUPROFEN 800 MG/1
800 TABLET ORAL 3 TIMES DAILY PRN
Qty: 60 TABLET | Refills: 3 | Status: SHIPPED | OUTPATIENT
Start: 2020-08-29 | End: 2020-12-10 | Stop reason: ALTCHOICE

## 2020-08-31 RX ORDER — LOSARTAN POTASSIUM AND HYDROCHLOROTHIAZIDE 25; 100 MG/1; MG/1
1 TABLET ORAL DAILY
Qty: 90 TABLET | Refills: 1 | Status: SHIPPED | OUTPATIENT
Start: 2020-08-31 | End: 2021-02-25

## 2020-08-31 RX ORDER — ROSUVASTATIN CALCIUM 20 MG/1
20 TABLET, COATED ORAL DAILY
Qty: 90 TABLET | Refills: 1 | Status: SHIPPED | OUTPATIENT
Start: 2020-08-31 | End: 2021-02-25

## 2020-08-31 NOTE — TELEPHONE ENCOUNTER
Medication:   Requested Prescriptions     Pending Prescriptions Disp Refills    rosuvastatin (CRESTOR) 20 MG tablet 90 tablet 1    losartan-hydroCHLOROthiazide (HYZAAR) 100-25 MG per tablet 90 tablet 1     Last Filled: 12.31.19, 12.31.19    Last appt: 7/28/2020   Next appt: 9/15/2020    Last OARRS: No flowsheet data found.

## 2020-09-11 LAB
HB: SOURCE: NORMAL
INDICATION FOR TESTING: NORMAL
SARS-COV-2: NEGATIVE

## 2020-09-15 ENCOUNTER — OFFICE VISIT (OUTPATIENT)
Dept: PRIMARY CARE CLINIC | Age: 70
End: 2020-09-15
Payer: MEDICARE

## 2020-09-15 VITALS
OXYGEN SATURATION: 98 % | TEMPERATURE: 97.2 F | BODY MASS INDEX: 36.96 KG/M2 | SYSTOLIC BLOOD PRESSURE: 144 MMHG | DIASTOLIC BLOOD PRESSURE: 76 MMHG | HEART RATE: 80 BPM | HEIGHT: 66 IN | RESPIRATION RATE: 16 BRPM | WEIGHT: 230 LBS

## 2020-09-15 PROCEDURE — 3046F HEMOGLOBIN A1C LEVEL >9.0%: CPT | Performed by: INTERNAL MEDICINE

## 2020-09-15 PROCEDURE — 3017F COLORECTAL CA SCREEN DOC REV: CPT | Performed by: INTERNAL MEDICINE

## 2020-09-15 PROCEDURE — 99214 OFFICE O/P EST MOD 30 MIN: CPT | Performed by: INTERNAL MEDICINE

## 2020-09-15 PROCEDURE — 2022F DILAT RTA XM EVC RTNOPTHY: CPT | Performed by: INTERNAL MEDICINE

## 2020-09-15 PROCEDURE — 1090F PRES/ABSN URINE INCON ASSESS: CPT | Performed by: INTERNAL MEDICINE

## 2020-09-15 PROCEDURE — G8427 DOCREV CUR MEDS BY ELIG CLIN: HCPCS | Performed by: INTERNAL MEDICINE

## 2020-09-15 PROCEDURE — 4040F PNEUMOC VAC/ADMIN/RCVD: CPT | Performed by: INTERNAL MEDICINE

## 2020-09-15 PROCEDURE — 1036F TOBACCO NON-USER: CPT | Performed by: INTERNAL MEDICINE

## 2020-09-15 PROCEDURE — 1123F ACP DISCUSS/DSCN MKR DOCD: CPT | Performed by: INTERNAL MEDICINE

## 2020-09-15 PROCEDURE — G8399 PT W/DXA RESULTS DOCUMENT: HCPCS | Performed by: INTERNAL MEDICINE

## 2020-09-15 PROCEDURE — G8417 CALC BMI ABV UP PARAM F/U: HCPCS | Performed by: INTERNAL MEDICINE

## 2020-09-15 RX ORDER — INSULIN GLARGINE 100 [IU]/ML
45 INJECTION, SOLUTION SUBCUTANEOUS 2 TIMES DAILY
COMMUNITY
Start: 2020-09-15 | End: 2020-10-26

## 2020-09-15 ASSESSMENT — ENCOUNTER SYMPTOMS
TROUBLE SWALLOWING: 0
SHORTNESS OF BREATH: 0
NAUSEA: 0
WHEEZING: 0
SORE THROAT: 0
SINUS PRESSURE: 0
CONSTIPATION: 0
DIARRHEA: 0
VOMITING: 0
BLOOD IN STOOL: 0
RHINORRHEA: 0
ABDOMINAL PAIN: 0
CHEST TIGHTNESS: 0
COUGH: 0

## 2020-09-15 NOTE — PROGRESS NOTES
Daneen Hatchet   YOB: 1950    Date of Visit:  9/15/2020    Chief Complaint   Patient presents with    Pre-op Exam     cataract 9/17/20 right eye Milka Philip MD       HPI  Pt presents for a preoperative history and physical exam. Pt is scheduled for a Phacoemulsification and Cataract Lens Implant Right eye to be done on 9/17/20 by Dr. Addi Mercado for senile cataract. Diabetes Mellitus Type 2:   Current Medications: Lantus 45 units bid, Trulicity 1.5 mg q week, and Humalog sliding scale before meals.   Diet: Patient decreases carbohydrates sometimes  Home blood sugar records: patient tests 1 time(s) per day fasting and it averages   Any episodes of hypoglycemia? no  Eye exam current (within one year):yes on 1/20/20  Current exercise: none     Hypertension:    Current Medications: Losartan-HCTZ 100-25mg po q day  Diet: low salt  Current exercise: none     Hyperlipidemia:  Current Medication: Rosuvastatin 20mg po q day   Diet: low fat, low cholesterol  Current exercise: none     Laryngopharyngeal reflux:  Current medication: Famotidine 20 mg po q day  Pt denies heartburn and reflux. Pt decreases caffeine, spicy foods, and tomato based foods.     Vitamin D deficiency:  Current Medication: Vitamin D 50,000 IU po q week  Pt denies fatigue and myalgias. Review of Systems   Constitutional: Negative for appetite change, chills, fatigue and fever. HENT: Negative for congestion, ear pain, postnasal drip, rhinorrhea, sinus pressure, sneezing, sore throat and trouble swallowing. Eyes: Positive for visual disturbance (with night driving). Respiratory: Negative for cough, chest tightness, shortness of breath and wheezing. Cardiovascular: Negative for chest pain, palpitations and leg swelling. Gastrointestinal: Negative for abdominal pain, blood in stool, constipation, diarrhea, nausea and vomiting. Genitourinary: Negative for dysuria, frequency and hematuria. Musculoskeletal: Negative for arthralgias and myalgias. Skin: Negative for rash and wound. Neurological: Negative for dizziness, tremors, seizures, syncope, facial asymmetry, weakness, light-headedness, numbness and headaches. Hematological: Negative for adenopathy. Does not bruise/bleed easily. Psychiatric/Behavioral: Negative for dysphoric mood and sleep disturbance. The patient is not nervous/anxious. Past Medical History:   Diagnosis Date    Dizziness     Hearing loss     Hyperlipidemia     Hypertension     Laryngopharyngeal reflux disease 2019    Tinnitus     Type II or unspecified type diabetes mellitus without mention of complication, not stated as uncontrolled     Urinary, incontinence, stress female 2020    Vitamin D deficiency 2019       Past Surgical History:   Procedure Laterality Date     SECTION      x 1     TONSILLECTOMY         Outpatient Medications Marked as Taking for the 9/15/20 encounter (Office Visit) with Carey Harry MD   Medication Sig Dispense Refill    insulin glargine (LANTUS) 100 UNIT/ML injection vial 45 Units 2 times daily      rosuvastatin (CRESTOR) 20 MG tablet Take 1 tablet by mouth daily 90 tablet 1    losartan-hydroCHLOROthiazide (HYZAAR) 100-25 MG per tablet Take 1 tablet by mouth daily 90 tablet 1    ibuprofen (ADVIL;MOTRIN) 800 MG tablet Take 1 tablet by mouth 3 times daily as needed for Pain 60 tablet 3    blood glucose test strips (ACCU-CHEK ANJU PLUS) strip TEST BLOOD SUGAR THREE TIMES DAILY 300 strip 2    insulin lispro, 1 Unit Dial, (HUMALOG KWIKPEN) 100 UNIT/ML SOPN Inject 6 units TID with meals +SSI 30 mL 3    Insulin Syringe-Needle U-100 (INSULIN SYRINGE 1CC/31GX5/16\") 31G X 5/16\" 1 ML MISC Use five times daily.  200 each 2    famotidine (PEPCID) 20 MG tablet Take 1 tablet by mouth daily 90 tablet 1    Dulaglutide 1.5 MG/0.5ML SOPN Inject 1.5 mg into the skin once a week 2 mL 5    vitamin D (ERGOCALCIFEROL) 1.25 MG (81025 UT) CAPS capsule TAKE ONE CAPSULE BY MOUTH ONCE A WEEK 12 capsule 1    UNKNOWN TO PATIENT Yellow and orange capped eye drops for BP in eye      Insulin Pen Needle (B-D UF III MINI PEN NEEDLES) 31G X 5 MM MISC 1 each by Does not apply route 2 times daily 100 each 6    ACCU-CHEK SOFTCLIX LANCETS MISC USE FOUR TIMES DAILY 300 each 6    Blood Glucose Monitoring Suppl (ACCU-CHEK ANJU PLUS) W/DEVICE KIT Use to monitor blood sugars 1 kit 0         Allergies   Allergen Reactions    Januvia [Sitagliptin Phosphate] Swelling     Side effects    Actos [Pioglitazone] Diarrhea    Avandia [Rosiglitazone] Diarrhea and Nausea Only    Dye [Iodides] Rash    Jardiance [Empagliflozin] Diarrhea    Metformin Diarrhea     Nausea         Social History     Tobacco Use    Smoking status: Never Smoker    Smokeless tobacco: Never Used   Substance Use Topics    Alcohol use: No       Family History   Problem Relation Age of Onset    Diabetes Sister     Heart Disease Sister 77    Diabetes Brother         had amputation    High Blood Pressure Brother     Cancer Father        Immunization History   Administered Date(s) Administered    Influenza Vaccine, unspecified formulation 10/02/2015, 12/01/2016    Influenza, High Dose (Fluzone 65 yrs and older) 10/02/2015, 12/01/2016, 12/18/2018, 12/10/2019    Pneumococcal Conjugate 13-valent ([id number]) 12/01/2016    Pneumococcal Polysaccharide ([id number]) 07/10/2015    Tdap (Boostrix, Adacel) 08/18/2010       Vitals:    09/15/20 1310 09/15/20 1326 09/15/20 1425   BP: (!) 158/72 (!) 148/73 (!) 144/76   Site: Left Upper Arm Left Upper Arm Left Upper Arm   Position: Sitting Sitting Sitting   Cuff Size: Medium Adult Medium Adult    Pulse: 80     Resp: 16     Temp: 97.2 °F (36.2 °C)     TempSrc: Infrared     SpO2: 98%     Weight: 230 lb (104.3 kg)     Height: 5' 6\" (1.676 m)       Body mass index is 37.12 kg/m². Physical Exam  Nursing note reviewed. Constitutional:       General: She is not in acute distress. Appearance: Normal appearance. She is well-developed. HENT:      Head: Normocephalic and atraumatic. Right Ear: Tympanic membrane and ear canal normal.      Left Ear: Tympanic membrane and ear canal normal.      Mouth/Throat:      Pharynx: Oropharynx is clear. Eyes:      General: Lids are normal.      Extraocular Movements: Extraocular movements intact. Conjunctiva/sclera: Conjunctivae normal.      Pupils: Pupils are equal, round, and reactive to light. Neck:      Musculoskeletal: Neck supple. Thyroid: No thyromegaly. Vascular: No carotid bruit. Cardiovascular:      Rate and Rhythm: Normal rate and regular rhythm. Heart sounds: Normal heart sounds, S1 normal and S2 normal. No murmur. No friction rub. No gallop. Pulmonary:      Effort: Pulmonary effort is normal. No respiratory distress. Breath sounds: Normal breath sounds. No wheezing, rhonchi or rales. Abdominal:      General: Bowel sounds are normal. There is no distension. Palpations: Abdomen is soft. Tenderness: There is no abdominal tenderness. Musculoskeletal:      Right lower leg: No edema. Left lower leg: No edema. Lymphadenopathy:      Head:      Right side of head: No submandibular adenopathy. Left side of head: No submandibular adenopathy. Neurological:      Mental Status: She is alert and oriented to person, place, and time. Cranial Nerves: No cranial nerve deficit. Deep Tendon Reflexes: Reflexes are normal and symmetric.    Psychiatric:         Mood and Affect: Mood normal.           Lab Review   Office Visit on 07/28/2020   Component Date Value    Microalbumin, Random Uri* 07/28/2020 13.30*    Creatinine, Ur 07/28/2020 108.7     Microalbumin Creatinine * 07/28/2020 122.4*   Orders Only on 07/24/2020   Component Date Value    Hemoglobin A1C 07/24/2020 10.0     eAG 07/24/2020 240.3    Orders Only on

## 2020-09-16 ENCOUNTER — TELEPHONE (OUTPATIENT)
Dept: ADMINISTRATIVE | Age: 70
End: 2020-09-16

## 2020-09-22 ENCOUNTER — TELEPHONE (OUTPATIENT)
Dept: ENDOCRINOLOGY | Age: 70
End: 2020-09-22

## 2020-09-22 NOTE — TELEPHONE ENCOUNTER
She should reduce her Lantus further to 35 units twice daily. She can hold her insulin if her sugar is less than 150.

## 2020-09-22 NOTE — TELEPHONE ENCOUNTER
Spoke with pt and pt is taking her insulin as prescribed. Pt stated last night she did not take any lantus due to her sugar was 125. Pt wants to know when and when not to take her insulin if BG is in range?

## 2020-09-23 NOTE — TELEPHONE ENCOUNTER
Patient left a voicemail stating she was informed to reduce her lantus but now wants to know what to do about her humalog

## 2020-09-23 NOTE — TELEPHONE ENCOUNTER
Called pt and l/m advising per Dr. Dolores Ngo: She should reduce her Lantus further to 35 units twice daily. She can hold her insulin if her sugar is less than 150.  FM 9/23/2020 @ 1238pm.

## 2020-09-24 ENCOUNTER — TELEPHONE (OUTPATIENT)
Dept: ENDOCRINOLOGY | Age: 70
End: 2020-09-24

## 2020-09-24 NOTE — TELEPHONE ENCOUNTER
Spoke with pt and advised per Dr. Shahida Bravo: She should take Humalog 10 minutes before meals. She can have a snack 2-3 hours after her meal. Eat meals 4-5 hours apart.      Please schedule a visit with Gloria Jackson to have a detailed education on diet and insulin. Orders placed. FM 9/24/2020.

## 2020-10-13 ENCOUNTER — TELEPHONE (OUTPATIENT)
Dept: PRIMARY CARE CLINIC | Age: 70
End: 2020-10-13

## 2020-10-13 NOTE — TELEPHONE ENCOUNTER
----- Message from Sherwood tracie sent at 10/13/2020  8:45 AM EDT -----  Subject: Message to Provider    QUESTIONS  Information for Provider? pt req call back when orders are placed for lab   work that provider stated she needed done prior to visit  ---------------------------------------------------------------------------  --------------  [address]  What is the best way for the office to contact you? OK to leave message on   voicemail  Preferred Call Back Phone Number? [phone]  ---------------------------------------------------------------------------  --------------  SCRIPT ANSWERS  Relationship to Patient?  Self

## 2020-10-13 NOTE — TELEPHONE ENCOUNTER
----- Message from [place] sent at 10/13/2020  8:45 AM EDT -----  Subject: Message to Provider    QUESTIONS  Information for Provider? pt req call back when orders are placed for lab   work that provider stated she needed done prior to visit  ---------------------------------------------------------------------------  --------------  [address]  What is the best way for the office to contact you? OK to leave message on   voicemail  Preferred Call Back Phone Number? [phone]  ---------------------------------------------------------------------------  --------------  SCRIPT ANSWERS  Relationship to Patient?  Self

## 2020-10-13 NOTE — TELEPHONE ENCOUNTER
Call patient. Her lab orders are in [address] [place] [address] Rd. She needs to fast 8-10 hours prior to the labs. She can have the labs done 2-3 days prior to next visit.

## 2020-10-26 ENCOUNTER — TELEPHONE (OUTPATIENT)
Dept: PRIMARY CARE CLINIC | Age: 70
End: 2020-10-26

## 2020-10-26 RX ORDER — INSULIN GLARGINE 100 [IU]/ML
35 INJECTION, SOLUTION SUBCUTANEOUS 2 TIMES DAILY
Qty: 3 VIAL | Refills: 2 | Status: SHIPPED | OUTPATIENT
Start: 2020-10-26 | End: 2021-01-11

## 2020-10-26 NOTE — TELEPHONE ENCOUNTER
----- Message from Luke Chavez sent at 10/26/2020  2:14 PM EDT -----  Subject: Message to Provider    QUESTIONS  Information for Provider? Please call back as patient would like to   schedule virtual visit instead of coming in office on 10/29/20 at 1:45pm.   Patient will keep appt if not able to change   ---------------------------------------------------------------------------  --------------  [address]  What is the best way for the office to contact you? OK to leave message on   voicemail  Preferred Call Back Phone Number? [phone]  ---------------------------------------------------------------------------  --------------  SCRIPT ANSWERS  Relationship to Patient?  Self

## 2020-10-27 DIAGNOSIS — E78.2 MIXED HYPERLIPIDEMIA: ICD-10-CM

## 2020-10-27 DIAGNOSIS — I10 ESSENTIAL HYPERTENSION: ICD-10-CM

## 2020-10-27 DIAGNOSIS — E55.9 VITAMIN D DEFICIENCY: ICD-10-CM

## 2020-10-27 LAB
A/G RATIO: 1.1 (ref 1.1–2.2)
ALBUMIN SERPL-MCNC: 3.5 G/DL (ref 3.4–5)
ALP BLD-CCNC: 92 U/L (ref 40–129)
ALT SERPL-CCNC: 17 U/L (ref 10–40)
ANION GAP SERPL CALCULATED.3IONS-SCNC: 9 MMOL/L (ref 3–16)
AST SERPL-CCNC: 19 U/L (ref 15–37)
BILIRUB SERPL-MCNC: 0.3 MG/DL (ref 0–1)
BUN BLDV-MCNC: 23 MG/DL (ref 7–20)
CALCIUM SERPL-MCNC: 9.2 MG/DL (ref 8.3–10.6)
CHLORIDE BLD-SCNC: 97 MMOL/L (ref 99–110)
CHOLESTEROL, TOTAL: 112 MG/DL (ref 0–199)
CO2: 32 MMOL/L (ref 21–32)
CREAT SERPL-MCNC: 1.1 MG/DL (ref 0.6–1.2)
ESTIMATED AVERAGE GLUCOSE: 177.2 MG/DL
GFR AFRICAN AMERICAN: 59
GFR NON-AFRICAN AMERICAN: 49
GLOBULIN: 3.3 G/DL
GLUCOSE BLD-MCNC: 154 MG/DL (ref 70–99)
HBA1C MFR BLD: 7.8 %
HDLC SERPL-MCNC: 36 MG/DL (ref 40–60)
LDL CHOLESTEROL CALCULATED: 50 MG/DL
POTASSIUM SERPL-SCNC: 4.2 MMOL/L (ref 3.5–5.1)
SODIUM BLD-SCNC: 138 MMOL/L (ref 136–145)
TOTAL PROTEIN: 6.8 G/DL (ref 6.4–8.2)
TRIGL SERPL-MCNC: 128 MG/DL (ref 0–150)
VITAMIN D 25-HYDROXY: 51.6 NG/ML
VLDLC SERPL CALC-MCNC: 26 MG/DL

## 2020-10-29 ENCOUNTER — VIRTUAL VISIT (OUTPATIENT)
Dept: PRIMARY CARE CLINIC | Age: 70
End: 2020-10-29
Payer: MEDICARE

## 2020-10-29 PROBLEM — E66.01 MORBIDLY OBESE (HCC): Status: ACTIVE | Noted: 2020-10-29

## 2020-10-29 PROCEDURE — 99213 OFFICE O/P EST LOW 20 MIN: CPT | Performed by: INTERNAL MEDICINE

## 2020-10-29 PROCEDURE — G8399 PT W/DXA RESULTS DOCUMENT: HCPCS | Performed by: INTERNAL MEDICINE

## 2020-10-29 PROCEDURE — G8427 DOCREV CUR MEDS BY ELIG CLIN: HCPCS | Performed by: INTERNAL MEDICINE

## 2020-10-29 PROCEDURE — 1090F PRES/ABSN URINE INCON ASSESS: CPT | Performed by: INTERNAL MEDICINE

## 2020-10-29 PROCEDURE — 4040F PNEUMOC VAC/ADMIN/RCVD: CPT | Performed by: INTERNAL MEDICINE

## 2020-10-29 PROCEDURE — 2022F DILAT RTA XM EVC RTNOPTHY: CPT | Performed by: INTERNAL MEDICINE

## 2020-10-29 PROCEDURE — 3051F HG A1C>EQUAL 7.0%<8.0%: CPT | Performed by: INTERNAL MEDICINE

## 2020-10-29 PROCEDURE — 1123F ACP DISCUSS/DSCN MKR DOCD: CPT | Performed by: INTERNAL MEDICINE

## 2020-10-29 PROCEDURE — 3017F COLORECTAL CA SCREEN DOC REV: CPT | Performed by: INTERNAL MEDICINE

## 2020-10-29 ASSESSMENT — ENCOUNTER SYMPTOMS
NAUSEA: 0
SHORTNESS OF BREATH: 0
VOMITING: 0
ABDOMINAL PAIN: 0

## 2020-10-29 NOTE — PROGRESS NOTES
10/29/2020    TELEHEALTH EVALUATION -- Audio/Visual (During RLXSQ-82 public health emergency)    Chief Complaint   Patient presents with    Diabetes       HPI:    Yesika Zhang (:  1950) has requested an audio/video evaluation for the following concern(s):    Diabetes Mellitus Type 2:   Current Medications: Lantus 35 units bid, Trulicity 1.5mg q week, and Humalog 6 units pre-meals. Pt states she is taking her medication at the same time daily. Diet: Patient states is eating better. Pt states she cut back on pop. Pt states she decreases bread, rice, and potatoes. Pt states she doesn't do well decreasing sweets and gets cravings for sweets. Home blood sugar records: patient tests 3 times per day. Pt states her fasting averages 152, pre-lunch , and pre-dinner   Any episodes of hypoglycemia? no  Eye exam current (within one year): yes  Current exercise: Patient states she just joined a boxing exercise class    Urinary problem:  Pt c/o malodorous urine x 2 days. Pt states her urine is dark. Pt denies urinary frequency, urinary urgency, and hematuria. Pt states she hadn't been drinking a lot of water and has increased her water and started drinking cranberry juice. Review of Systems   Constitutional: Negative for chills, fatigue and fever. Eyes: Negative for visual disturbance. Respiratory: Negative for shortness of breath. Cardiovascular: Negative for chest pain. Gastrointestinal: Negative for abdominal pain, nausea and vomiting. Genitourinary: Negative for decreased urine volume, difficulty urinating, dysuria, flank pain, frequency, hematuria and urgency. Musculoskeletal: Negative for back pain. Skin: Negative for wound. Neurological: Negative for dizziness, light-headedness, numbness and headaches. Prior to Visit Medications    Medication Sig Taking?  Authorizing Provider   insulin glargine (LANTUS) 100 UNIT/ML injection vial Inject 35 Units into the skin 2 times daily Yes Bárbara Rincon MD   rosuvastatin (CRESTOR) 20 MG tablet Take 1 tablet by mouth daily Yes Dashawn Briones MD   losartan-hydroCHLOROthiazide (HYZAAR) 100-25 MG per tablet Take 1 tablet by mouth daily Yes Dashawn Briones MD   ibuprofen (ADVIL;MOTRIN) 800 MG tablet Take 1 tablet by mouth 3 times daily as needed for Pain Yes Dashawn Briones MD   blood glucose test strips (ACCU-CHEK ANJU PLUS) strip TEST BLOOD SUGAR THREE TIMES DAILY Yes Bárbara Rincon MD   insulin lispro, 1 Unit Dial, (HUMALOG KWIKPEN) 100 UNIT/ML SOPN Inject 6 units TID with meals +SSI Yes Bárbara Rincon MD   Insulin Syringe-Needle U-100 (INSULIN SYRINGE 1CC/31GX5/16\") 31G X 5/16\" 1 ML MISC Use five times daily.  Yes Bárbara Rincon MD   famotidine (PEPCID) 20 MG tablet Take 1 tablet by mouth daily Yes Dashawn Briones MD   Dulaglutide 1.5 MG/0.5ML SOPN Inject 1.5 mg into the skin once a week Yes Dashawn Briones MD   vitamin D (ERGOCALCIFEROL) 1.25 MG (96175 UT) CAPS capsule TAKE ONE CAPSULE BY MOUTH ONCE A WEEK Yes Dashawn Briones MD   UNKNOWN TO PATIENT Yellow and orange capped eye drops for BP in eye Yes Historical Provider, MD   Insulin Pen Needle (B-D UF III MINI PEN NEEDLES) 31G X 5 MM MISC 1 each by Does not apply route 2 times daily Yes Hortensia Wilson MD   ACCU-CHEK SOFTCLIX LANCETS MISC USE FOUR TIMES DAILY Yes Bárbara Rincon MD   Blood Glucose Monitoring Suppl (ACCU-CHEK ANJU PLUS) W/DEVICE KIT Use to monitor blood sugars Yes Dashawn Briones MD       Social History     Tobacco Use    Smoking status: Never Smoker    Smokeless tobacco: Never Used   Substance Use Topics    Alcohol use: No    Drug use: No        Allergies   Allergen Reactions    Januvia [Sitagliptin Phosphate] Swelling     Side effects    Actos [Pioglitazone] Diarrhea    Avandia [Rosiglitazone] Diarrhea and Nausea Only    Dye [Iodides] Rash    Jardiance [Empagliflozin] Diarrhea    Metformin Diarrhea     Nausea PHYSICAL EXAMINATION:  [ INSTRUCTIONS:  \"[x]\" Indicates a positive item  \"[]\" Indicates a negative item  -- DELETE ALL ITEMS NOT EXAMINED]  Vital Signs: (As obtained by patient/caregiver or practitioner observation)    Blood pressure-  Heart rate-    Respiratory rate-    Temperature-  Pulse oximetry-     Patient-Reported Vitals 10/29/2020   Patient-Reported Weight 230   Patient-Reported Height 5'6\"        Constitutional: [x] Appears well-developed and well-nourished [x] No apparent distress      [] Abnormal-   Mental status  [x] Alert and awake  [x] Oriented to person/place/time [x]Able to follow commands      Eyes:  EOM    [x]  Normal  [] Abnormal-  Sclera  [x]  Normal  [] Abnormal -         Discharge [x]  None visible  [] Abnormal -    HENT:   [x] Normocephalic, atraumatic.   [] Abnormal   [x] Mouth/Throat: Mucous membranes are moist.     External Ears [x] Normal  [] Abnormal-     Neck: [x] No visualized mass     Pulmonary/Chest: [x] Respiratory effort normal.  [x] No visualized signs of difficulty breathing or respiratory distress        [] Abnormal-      Musculoskeletal:   [] Normal gait with no signs of ataxia         [x] Normal range of motion of neck        [] Abnormal-       Neurological:        [x] No Facial Asymmetry (Cranial nerve 7 motor function) (limited exam to video visit)          [] No gaze palsy        [] Abnormal-         Skin:        [x] No significant exanthematous lesions or discoloration noted on facial skin         [] Abnormal-            Psychiatric:       [x] Normal Affect [] No Hallucinations        [] Abnormal-     Other pertinent observable physical exam findings-       Lab Review   Orders Only on 10/27/2020   Component Date Value    Vit D, 25-Hydroxy 10/27/2020 51.6     Sodium 10/27/2020 138     Potassium 10/27/2020 4.2     Chloride 10/27/2020 97*    CO2 10/27/2020 32     Anion Gap 10/27/2020 9     Glucose 10/27/2020 154*    BUN 10/27/2020 23*    CREATININE 10/27/2020 1.1  GFR Non- 10/27/2020 49*    GFR  10/27/2020 59*    Calcium 10/27/2020 9.2     Total Protein 10/27/2020 6.8     Alb 10/27/2020 3.5     Albumin/Globulin Ratio 10/27/2020 1.1     Total Bilirubin 10/27/2020 0.3     Alkaline Phosphatase 10/27/2020 92     ALT 10/27/2020 17     AST 10/27/2020 19     Globulin 10/27/2020 3.3     Cholesterol, Total 10/27/2020 112     Triglycerides 10/27/2020 128     HDL 10/27/2020 36*    LDL Calculated 10/27/2020 50     VLDL Cholesterol Calcula* 10/27/2020 26     Hemoglobin A1C 10/27/2020 7.8     eAG 10/27/2020 177.2    Office Visit on 07/28/2020   Component Date Value    Microalbumin, Random Uri* 07/28/2020 13.30*    Creatinine, Ur 07/28/2020 108.7     Microalbumin Creatinine * 07/28/2020 122.4*   Orders Only on 07/24/2020   Component Date Value    Hemoglobin A1C 07/24/2020 10.0     eAG 07/24/2020 240.3        ASSESSMENT/PLAN:  1. Uncontrolled type 2 diabetes mellitus with complication, with long-term current use of insulin (HCC)  - Hemoglobin A1c of 7.8% shows improvement  -Continue same medications  -Limit carbohydrates to 45 grams with meals and 15 grams with snacks  -monitor blood sugars  -Regular aerobic exercise  -Follow up with Endocrinology as scheduled    2. Microalbuminuria  - Referral to [facility]) Sabine Fox MD, Nephrology, [facility]    3. Morbidly obese ([facility] [address]  -discussed  -BMI is 37.12  -patient plans to try Weight Watcher's    4. Malodorous urine  - Urinalysis; Future  - Culture, Urine; Future   -increase water intake      Return in about 7 weeks (around 12/15/2020) for Medicare AWVWilfredo Trujillo is a 79 y.o. female being evaluated by a Virtual Visit (video visit) encounter to address concerns as mentioned above. A caregiver was present when appropriate.  Due to this being a TeleHealth encounter (During [facility]- public health emergency), evaluation of the following organ systems was limited: Vitals/Constitutional/EENT/Resp/CV/GI//MS/Neuro/Skin/Heme-Lymph-Imm. Pursuant to the emergency declaration under the [address] [place] [address] and the [employer] Act, this Virtual Visit was conducted with patient's (and/or legal guardian's) consent, to reduce the patient's risk of exposure to COVID-19 and provide necessary medical care. The patient (and/or legal guardian) has also been advised to contact this office for worsening conditions or problems, and seek emergency medical treatment and/or call 911 if deemed necessary. Patient identification was verified at the start of the visit: Yes    Total time spent on this encounter: Not billed by time    Services were provided through a video synchronous discussion virtually to substitute for in-person clinic visit. Patient and provider were located at their individual homes. --Latonia Terry MD on 10/30/2020 at 5:01 PM    An electronic signature was used to authenticate this note.

## 2020-10-30 ASSESSMENT — ENCOUNTER SYMPTOMS: BACK PAIN: 0

## 2020-11-02 ENCOUNTER — TELEPHONE (OUTPATIENT)
Dept: PRIMARY CARE CLINIC | Age: 70
End: 2020-11-02

## 2020-11-02 NOTE — TELEPHONE ENCOUNTER
Samson PUENTES [facility] Clinical Staff               Subject: Message to Provider     QUESTIONS   Information for Provider? patient has a sore on her scalp in the back of   her head . patient would like to know if she should be seen or if Dr. Dereje Mtz can give her an RX .   ---------------------------------------------------------------------------   --------------   [address]   What is the best way for the office to contact you? OK to leave message on   voicemail   Preferred Call Back Phone Number? [phone]   ---------------------------------------------------------------------------   --------------   SCRIPT ANSWERS   Relationship to Patient?  Self

## 2020-11-03 ENCOUNTER — VIRTUAL VISIT (OUTPATIENT)
Dept: ENDOCRINOLOGY | Age: 70
End: 2020-11-03
Payer: MEDICARE

## 2020-11-03 PROCEDURE — 99441 PR PHYS/QHP TELEPHONE EVALUATION 5-10 MIN: CPT | Performed by: INTERNAL MEDICINE

## 2020-11-03 PROCEDURE — 97803 MED NUTRITION INDIV SUBSEQ: CPT | Performed by: DIETITIAN, REGISTERED

## 2020-11-03 ASSESSMENT — ENCOUNTER SYMPTOMS
COUGH: 0
DOUBLE VISION: 0
BACK PAIN: 0
ORTHOPNEA: 0
BLURRED VISION: 0
HEMOPTYSIS: 0
ABDOMINAL PAIN: 0
HEARTBURN: 0
PHOTOPHOBIA: 0
CONSTIPATION: 0
NAUSEA: 0
DIARRHEA: 0

## 2020-11-03 NOTE — PROGRESS NOTES
Medical Nutrition Therapy for Diabetes    Janet Sanchez  November 3, 2020      Patient Care Team:  Bal Lombardo MD as PCP - General (Internal Medicine)  Bal Lombardo MD as PCP - [facility] Provider  Lazaro Garcia MD as Consulting Physician (Ophthalmology)  Breanna Paulino MD as Consulting Physician (Gastroenterology)  Endy Rosen DPM as Consulting Physician (Podiatry)  Kaylen Jo RD, SAWYER as Diabetic Educator (Dietitian)  Roosevelt Groves MD as Consulting Physician (Otolaryngology)    Reason for visit: Type 2 Diabetes    ASSESSMENT/PLAN:   NUTRITION DIAGNOSIS   Identified patient by name and date of birth. Patient is meeting this telephone appointment at home. I am at [facility] office. Patient is the sole participant on the phone call. Pursuant to the emergency declaration under the [address] [facility] waiver authority and the [employer] and [employer] General Act this Phone Visit was insisted, with patient's consent, to reduce the patient's risk of exposure to COVID-19 and provide continuity of care for an established patient. #1 Problem: Altered Nutrition-Related Laboratory Values (NC-2.2)  Related to: Endocrine/Diabetes   As Evidenced by: Elevated Plasma glucose and/or HgbA1c levels         #2 Problem:  Inconsistent Carbohydrate and Fiber Intake  Related to: Food- and nutrition-related knowledge deficit concerning appropriate amount of carbohydrate and fiber intake  As evidenced by: patient food recall      #3 Problem: Excessive Carbohydrate Intake (NI-5.8. 2)  Related to: Food-and nutrition-related knowledge deficit concerning appropriate amount of carbohydrate intake  As evidenced by: Estimated carbohydrate intake that is consistently more than the recommended amounts    NUTRITION INTERVENTION  Nutrition Prescription: 3 meals, include carbohydrate each meal, take Humalog   Each tablet by mouth daily 90 tablet 1    Dulaglutide 1.5 MG/0.5ML SOPN Inject 1.5 mg into the skin once a week 2 mL 5    vitamin D (ERGOCALCIFEROL) 1.25 MG (77209 UT) CAPS capsule TAKE ONE CAPSULE BY MOUTH ONCE A WEEK 12 capsule 1    UNKNOWN TO PATIENT Yellow and orange capped eye drops for BP in eye      Insulin Pen Needle (B-D UF III MINI PEN NEEDLES) 31G X 5 MM MISC 1 each by Does not apply route 2 times daily 100 each 6    ACCU-CHEK SOFTCLIX LANCETS MISC USE FOUR TIMES DAILY 300 each 6    Blood Glucose Monitoring Suppl (ACCU-CHEK ANJU PLUS) W/DEVICE KIT Use to monitor blood sugars 1 kit 0     No current facility-administered medications for this visit.           NUTRITION ASSESSMENT    Biochemical Data:    Lab Results   Component Value Date    LABA1C 7.8 10/27/2020     Lab Results   Component Value Date    .2 10/27/2020       Lab Results   Component Value Date    CHOL 112 10/27/2020    CHOL 117 04/23/2020    CHOL 216 (H) 08/26/2019     Lab Results   Component Value Date    TRIG 128 10/27/2020    TRIG 133 04/23/2020    TRIG 155 (H) 08/26/2019     Lab Results   Component Value Date    HDL 36 (L) 10/27/2020    HDL 33 (L) 04/23/2020    HDL 36 (L) 08/26/2019     Lab Results   Component Value Date    LDLCALC 50 10/27/2020    LDLCALC 57 04/23/2020    LDLCALC 149 (H) 08/26/2019     Lab Results   Component Value Date    LABVLDL 26 10/27/2020    LABVLDL 27 04/23/2020    LABVLDL 31 08/26/2019     No results found for: [facility]    Lab Results   Component Value Date    WBC 8.7 08/26/2019    HGB 11.2 (L) 08/26/2019    HCT 32.5 (L) 08/26/2019    MCV 90.4 08/26/2019     08/26/2019       Lab Results   Component Value Date    CREATININE 1.1 10/27/2020    BUN 23 (H) 10/27/2020     10/27/2020    K 4.2 10/27/2020    CL 97 (L) 10/27/2020    CO2 32 10/27/2020       Diabetes Medications:   Knows name and dose of prescribed medications   Knows prescribed schedule for medications  Recent change in medication type/dosage:   Stores  medications properly  Comments:     Monitoring:   Has BG meter:   Testing frequency:   Recent results:   Hypoglycemia? Anthropometric Measurements: Wt:   Wt Readings from Last 3 Encounters:   09/15/20 230 lb (104.3 kg)   07/30/20 239 lb (108.4 kg)   07/28/20 239 lb 12.8 oz (108.8 kg)      BMI:   BMI Readings from Last 3 Encounters:   09/15/20 37.12 kg/m²   07/30/20 32.41 kg/m²   07/28/20 38.70 kg/m²     Patient's stated goal weight:   7% Weight loss goal weight:     Physical Activity History:   Physical activity:   Frequency of activity:   Duration of activity:   Obstacles to activity:     Sleep:     Food and Nutrition History:   Nutrition Awareness/Previous DSMES: yes,  Diabetes Education Classes  Number of people in household: 1  Frequency of Meals Eaten away from home:1-2 daily  Food Availability Problems  Within the past 12 months, have you worried that your food would run out before you got money to buy more? No  Within the past 12 months, has the food you bought not lasted till the end of the month and you didn't have money to get more?  No  Beverage consumption: cranberry juice - AM ~8oz, diet pop occasionally, water - 2-3 bottles,   Alcohol consumption:   Usual Food consumption:    10:30-11:30 am 1st meal Cereal (oats), sausage and pancake stick - 2                                Apple or banana    9-10 PM dinner  Barriers:   -          Follow Up Plan:     Referring Provider: Marlena Herrmann MD  Time spent with patient: 30 minutes

## 2020-11-03 NOTE — PROGRESS NOTES
239 lb 12.8 oz (108.8 kg)     BP Readings from Last 3 Encounters:   09/15/20 (!) 144/76   20 (!) 155/74   20 134/68        Past Medical History:   Diagnosis Date    Dizziness     Hearing loss     Hyperlipidemia     Hypertension     Laryngopharyngeal reflux disease 2019    Morbidly obese ([address] 10/29/2020    Tinnitus     Type II or unspecified type diabetes mellitus without mention of complication, not stated as uncontrolled     Urinary, incontinence, stress female 2020    Vitamin D deficiency 2019     Past Surgical History:   Procedure Laterality Date     SECTION      x 1     EYE SURGERY      right eye- cataract     TONSILLECTOMY       Family History   Problem Relation Age of Onset    Diabetes Sister     Heart Disease Sister 77    Diabetes Brother         had amputation    High Blood Pressure Brother     Cancer Father      Social History     Tobacco Use   Smoking Status Never Smoker   Smokeless Tobacco Never Used      Social History     Substance and Sexual Activity   Alcohol Use No       HPI      Maura Whitmore is a 79 y.o. female who is here for management of uncontrolled DM. PCP :  Kirk Tao MD  Due to the COVID-19 restrictions on close contact interactions the patient's visit was conducted via phone in lieu of a face to face visit. Location for patient : home  Physician : home     Pursuant to the emergency declaration under the [address] [place] [address] and the [employer] Act, this Virtual  Visit was conducted, with patient's consent, to reduce the patient's risk of exposure to COVID-19 and provide necessary care. Patient has a PMH of Type 2 DM, hypertension, hyperlipidemia, obesity     Diagnosed with Diabetes Mellitus type 2 > 10 yrs,  Course has been variable . She has been non-compliant as she wanted to control her DM without food. Microvascular complications: has retinopathy s/p Anti-VEGF shot ( [facility])   Nephropathy : has microalbuminuria. Has Peripheral neuropathy    Home regimen:   Lantus 35 units BID  Humalog 6 units TID with meals. Trulicity 1.5 mg weekly. Previous medications: Actos ( leg swelling), metformin    Blood glucose trend   fasting 120-150     P. M 100-200      Diet: Eats 3 meals/day. Nutrition education: Yes  Exercise: Pool exercises  Has arthritis. Hyperlipidemia:  She has mixed hyperlipidemia. She Crestor 20 mg daily. Hypertension:  Hyzaar      Review of Systems   Constitutional: Positive for malaise/fatigue. Negative for chills, diaphoresis, fever and weight loss. HENT: Negative for ear discharge, ear pain, hearing loss, nosebleeds and tinnitus. Eyes: Negative for blurred vision, double vision and photophobia. Respiratory: Negative for cough and hemoptysis. Cardiovascular: Negative for chest pain, palpitations and orthopnea. Gastrointestinal: Negative for abdominal pain, constipation, diarrhea, heartburn and nausea. Genitourinary: Negative for dysuria, flank pain, frequency, hematuria and urgency. Musculoskeletal: Negative for back pain, falls, joint pain, myalgias and neck pain. Skin: Negative for itching and rash. Neurological: Negative for dizziness, tingling, tremors, sensory change, speech change, focal weakness, seizures, loss of consciousness and headaches. Endo/Heme/Allergies: Negative for environmental allergies and polydipsia. Does not bruise/bleed easily. Psychiatric/Behavioral: Negative for depression, hallucinations, memory loss, substance abuse and suicidal ideas. The patient is not nervous/anxious and does not have insomnia. Orders Only on 10/27/2020   Component Date Value Ref Range Status    Vit D, 25-Hydroxy 10/27/2020 51.6  >=30 ng/mL Final    Comment: <=20 ng/mL. ........... Waldemar Larson Deficient  21-29 ng/mL. ......... Waldemar Staplesore Insufficient  >=30

## 2020-11-17 RX ORDER — INSULIN LISPRO 100 [IU]/ML
INJECTION, SOLUTION INTRAVENOUS; SUBCUTANEOUS
Qty: 30 ML | Refills: 3 | Status: SHIPPED | OUTPATIENT
Start: 2020-11-17 | End: 2021-01-11

## 2020-11-17 NOTE — TELEPHONE ENCOUNTER
Pt called stating her [facility] pharmacy does not have the refill of her Humalog. Pt will need a new script sent. Pt would like to know when this is sent so she can make sure her pharmacy has it as she does not think her pharmacy keeps up with her meds correctly.

## 2020-11-17 NOTE — TELEPHONE ENCOUNTER
LOV: 11/3/2020    NOV: 12/15/2020    DOSE: 6 units + SSI    Frequency: TID    Pharmacy as Pended:     Per LOV Note: Continue  humalog  6 units TID with meals +  SSI    Per Last PHONE NOTE:     Lab Results   Component Value Date    LABA1C 7.8 10/27/2020

## 2020-12-10 DIAGNOSIS — I10 ESSENTIAL HYPERTENSION: ICD-10-CM

## 2020-12-10 DIAGNOSIS — N18.31 STAGE 3A CHRONIC KIDNEY DISEASE (HCC): ICD-10-CM

## 2020-12-10 LAB
ANION GAP SERPL CALCULATED.3IONS-SCNC: 11 MMOL/L (ref 3–16)
BACTERIA: ABNORMAL /HPF
BILIRUBIN URINE: NEGATIVE
BLOOD, URINE: NEGATIVE
BUN BLDV-MCNC: 21 MG/DL (ref 7–20)
CALCIUM SERPL-MCNC: 9.1 MG/DL (ref 8.3–10.6)
CHLORIDE BLD-SCNC: 99 MMOL/L (ref 99–110)
CLARITY: CLEAR
CO2: 29 MMOL/L (ref 21–32)
COLOR: YELLOW
CREAT SERPL-MCNC: 1 MG/DL (ref 0.6–1.2)
CREATININE URINE: 115.4 MG/DL (ref 28–259)
EPITHELIAL CELLS, UA: 1 /HPF (ref 0–5)
GFR AFRICAN AMERICAN: >60
GFR NON-AFRICAN AMERICAN: 55
GLUCOSE BLD-MCNC: 231 MG/DL (ref 70–99)
GLUCOSE URINE: NEGATIVE MG/DL
HCT VFR BLD CALC: 32.4 % (ref 36–48)
HEMOGLOBIN: 11 G/DL (ref 12–16)
HYALINE CASTS: 0 /LPF (ref 0–8)
KETONES, URINE: NEGATIVE MG/DL
LEUKOCYTE ESTERASE, URINE: ABNORMAL
MCH RBC QN AUTO: 30.9 PG (ref 26–34)
MCHC RBC AUTO-ENTMCNC: 34 G/DL (ref 31–36)
MCV RBC AUTO: 90.8 FL (ref 80–100)
MICROSCOPIC EXAMINATION: YES
NITRITE, URINE: POSITIVE
PARATHYROID HORMONE INTACT: 71.7 PG/ML (ref 14–72)
PDW BLD-RTO: 13.1 % (ref 12.4–15.4)
PH UA: 6.5 (ref 5–8)
PLATELET # BLD: 393 K/UL (ref 135–450)
PMV BLD AUTO: 6.9 FL (ref 5–10.5)
POTASSIUM SERPL-SCNC: 3.9 MMOL/L (ref 3.5–5.1)
PROTEIN PROTEIN: 44 MG/DL
PROTEIN UA: 30 MG/DL
PROTEIN/CREAT RATIO: 0.4 MG/DL
RBC # BLD: 3.57 M/UL (ref 4–5.2)
RBC UA: 1 /HPF (ref 0–4)
SODIUM BLD-SCNC: 139 MMOL/L (ref 136–145)
SPECIFIC GRAVITY UA: 1.02 (ref 1–1.03)
URINE TYPE: ABNORMAL
UROBILINOGEN, URINE: 0.2 E.U./DL
VITAMIN D 25-HYDROXY: 47.8 NG/ML
WBC # BLD: 10.7 K/UL (ref 4–11)
WBC UA: 41 /HPF (ref 0–5)

## 2020-12-11 ENCOUNTER — TELEPHONE (OUTPATIENT)
Dept: PRIMARY CARE CLINIC | Age: 70
End: 2020-12-11

## 2020-12-11 LAB
ALBUMIN SERPL-MCNC: 3.1 G/DL (ref 3.1–4.9)
ALPHA-1-GLOBULIN: 0.3 G/DL (ref 0.2–0.4)
ALPHA-2-GLOBULIN: 1.1 G/DL (ref 0.4–1.1)
BETA GLOBULIN: 1.4 G/DL (ref 0.9–1.6)
GAMMA GLOBULIN: 1.2 G/DL (ref 0.6–1.8)
SPE/IFE INTERPRETATION: NORMAL
TOTAL PROTEIN: 7 G/DL (ref 6.4–8.2)

## 2020-12-11 NOTE — TELEPHONE ENCOUNTER
----- Message from Jeaneth Lamar sent at 12/11/2020 11:47 AM EST -----  Subject: Message to Provider    QUESTIONS  Information for Provider? Patients wants to know if amlodipine is okay to   take with existing medication and would like to be called about urinalysis   results  ---------------------------------------------------------------------------  --------------  CALL BACK INFO  What is the best way for the office to contact you? OK to leave message on   voicemail  Do not leave any message   patient will call back for answer  Preferred Call Back Phone Number? [phone]  ---------------------------------------------------------------------------  --------------  SCRIPT ANSWERS  Relationship to Patient?  Self

## 2020-12-11 NOTE — TELEPHONE ENCOUNTER
----- Message from Alysa Misha sent at 12/11/2020 11:47 AM EST -----  Subject: Message to Provider    QUESTIONS  Information for Provider? Patients wants to know if amlodipine is okay to   take with existing medication and would like to be called about urinalysis   results  ---------------------------------------------------------------------------  --------------  CALL BACK INFO  What is the best way for the office to contact you? OK to leave message on   voicemail  Do not leave any message   patient will call back for answer  Preferred Call Back Phone Number? [phone]  ---------------------------------------------------------------------------  --------------  SCRIPT ANSWERS  Relationship to Patient?  Self

## 2020-12-11 NOTE — TELEPHONE ENCOUNTER
----- Message from Trav Rodriguez sent at 12/11/2020 11:47 AM EST -----  Subject: Message to Provider    QUESTIONS  Information for Provider? Patients wants to know if amlodipine is okay to   take with existing medication and would like to be called about urinalysis   results  ---------------------------------------------------------------------------  --------------  CALL BACK INFO  What is the best way for the office to contact you? OK to leave message on   voicemail  Do not leave any message   patient will call back for answer  Preferred Call Back Phone Number? [phone]  ---------------------------------------------------------------------------  --------------  SCRIPT ANSWERS  Relationship to Patient?  Self

## 2020-12-15 ENCOUNTER — VIRTUAL VISIT (OUTPATIENT)
Dept: PRIMARY CARE CLINIC | Age: 70
End: 2020-12-15
Payer: MEDICARE

## 2020-12-15 DIAGNOSIS — R82.90 MALODOROUS URINE: ICD-10-CM

## 2020-12-15 DIAGNOSIS — N39.0 URINARY TRACT INFECTION WITHOUT HEMATURIA, SITE UNSPECIFIED: ICD-10-CM

## 2020-12-15 LAB
BACTERIA: ABNORMAL /HPF
BILIRUBIN URINE: NEGATIVE
BLOOD, URINE: NEGATIVE
CLARITY: ABNORMAL
COLOR: YELLOW
EPITHELIAL CELLS, UA: 8 /HPF (ref 0–5)
GLUCOSE URINE: NEGATIVE MG/DL
HYALINE CASTS: 0 /LPF (ref 0–8)
KETONES, URINE: NEGATIVE MG/DL
LEUKOCYTE ESTERASE, URINE: ABNORMAL
MICROSCOPIC EXAMINATION: YES
NITRITE, URINE: POSITIVE
PH UA: 6.5 (ref 5–8)
PROTEIN UA: 30 MG/DL
RBC UA: 7 /HPF (ref 0–4)
SPECIFIC GRAVITY UA: 1.02 (ref 1–1.03)
URINE TYPE: ABNORMAL
UROBILINOGEN, URINE: 0.2 E.U./DL
WBC UA: 32 /HPF (ref 0–5)

## 2020-12-15 PROCEDURE — 4040F PNEUMOC VAC/ADMIN/RCVD: CPT | Performed by: INTERNAL MEDICINE

## 2020-12-15 PROCEDURE — 1123F ACP DISCUSS/DSCN MKR DOCD: CPT | Performed by: INTERNAL MEDICINE

## 2020-12-15 PROCEDURE — 3017F COLORECTAL CA SCREEN DOC REV: CPT | Performed by: INTERNAL MEDICINE

## 2020-12-15 PROCEDURE — G8484 FLU IMMUNIZE NO ADMIN: HCPCS | Performed by: INTERNAL MEDICINE

## 2020-12-15 PROCEDURE — G0439 PPPS, SUBSEQ VISIT: HCPCS | Performed by: INTERNAL MEDICINE

## 2020-12-15 RX ORDER — CIPROFLOXACIN 250 MG/1
250 TABLET, FILM COATED ORAL 2 TIMES DAILY
Qty: 14 TABLET | Refills: 0 | Status: SHIPPED | OUTPATIENT
Start: 2020-12-15 | End: 2020-12-22

## 2020-12-15 ASSESSMENT — PATIENT HEALTH QUESTIONNAIRE - PHQ9
SUM OF ALL RESPONSES TO PHQ QUESTIONS 1-9: 0
2. FEELING DOWN, DEPRESSED OR HOPELESS: 0
SUM OF ALL RESPONSES TO PHQ QUESTIONS 1-9: 0
SUM OF ALL RESPONSES TO PHQ9 QUESTIONS 1 & 2: 0
SUM OF ALL RESPONSES TO PHQ QUESTIONS 1-9: 0
1. LITTLE INTEREST OR PLEASURE IN DOING THINGS: 0

## 2020-12-15 ASSESSMENT — LIFESTYLE VARIABLES: HOW OFTEN DO YOU HAVE A DRINK CONTAINING ALCOHOL: 0

## 2020-12-15 NOTE — PROGRESS NOTES
Medicare Annual Wellness Visit  Name: Verena Motta Date: 12/15/2020   MRN: [id number] Sex: Female   Age: 79 y.o. Ethnicity: Non-/Non    : 1950 Race: Mary Carmen Encarnacion is here for Medicare AWV    Screenings for behavioral, psychosocial and functional/safety risks, and cognitive dysfunction are all negative except as indicated below. These results, as well as other patient data from the [address] form, are documented in Flowsheets linked to this Encounter. Patient c/o UTI symptoms of malodorous urine and urinary frequency. Patient denies urinary urgency, hematuria, abdominal pain, and fever. Allergies   Allergen Reactions    Januvia [Sitagliptin Phosphate] Swelling     Side effects    Actos [Pioglitazone] Diarrhea    Avandia [Rosiglitazone] Diarrhea and Nausea Only    Dye [Iodides] Rash    Jardiance [Empagliflozin] Diarrhea    Metformin Diarrhea     Nausea         Prior to Visit Medications    Medication Sig Taking? Authorizing Provider   amLODIPine (NORVASC) 10 MG tablet Take 1 tablet by mouth nightly Yes Nicky León MD   insulin lispro, 1 Unit Dial, (HUMALOG KWIKPEN) 100 UNIT/ML SOPN Inject 6 units TID with meals +SSI Yes Matthew Beaver MD   insulin glargine (LANTUS) 100 UNIT/ML injection vial Inject 35 Units into the skin 2 times daily Yes Matthew Beaver MD   rosuvastatin (CRESTOR) 20 MG tablet Take 1 tablet by mouth daily Yes Zohreh Neville MD   losartan-hydroCHLOROthiazide (HYZAAR) 100-25 MG per tablet Take 1 tablet by mouth daily Yes Zohreh Neville MD   blood glucose test strips (ACCU-CHEK ANJU PLUS) strip TEST BLOOD SUGAR THREE TIMES DAILY Yes Matthew Beaver MD   Insulin Syringe-Needle U-100 (INSULIN SYRINGE 1CC/31GX5/16\") 31G X 5/16\" 1 ML MISC Use five times daily.  Yes Matthew Beaver MD   famotidine (PEPCID) 20 MG tablet Take 1 tablet by mouth daily Yes Zohreh Neville MD Dulaglutide 1.5 MG/0.5ML SOPN Inject 1.5 mg into the skin once a week Yes Brenda Harris MD   vitamin D (ERGOCALCIFEROL) 1.25 MG (70329 UT) CAPS capsule TAKE ONE CAPSULE BY MOUTH ONCE A WEEK Yes Brenda Harris MD   UNKNOWN TO PATIENT Yellow and orange capped eye drops for BP in eye Yes Historical Provider, MD   Insulin Pen Needle (B-D UF III MINI PEN NEEDLES) 31G X 5 MM MISC 1 each by Does not apply route 2 times daily Yes Anabell Majano MD   ACCU-CHEK SOFTCLIX LANCETS MISC USE FOUR TIMES DAILY Yes Tete Martin MD   Blood Glucose Monitoring Suppl (ACCU-CHEK ANJU PLUS) W/DEVICE KIT Use to monitor blood sugars Yes Brenda Harris MD   verapamil (VERELAN PM) 100 MG CP24 extended release capsule Take 1 capsule by mouth daily  Brenda Harris MD         Past Medical History:   Diagnosis Date    Dizziness     Hearing loss     Hyperlipidemia     Hypertension     Laryngopharyngeal reflux disease 2019    Morbidly obese ([address] 10/29/2020    Tinnitus     Type II or unspecified type diabetes mellitus without mention of complication, not stated as uncontrolled     Urinary, incontinence, stress female 2020    Vitamin D deficiency 2019       Past Surgical History:   Procedure Laterality Date     SECTION      x 1     EYE SURGERY      right eye- cataract     TONSILLECTOMY           Family History   Problem Relation Age of Onset    Diabetes Sister     Heart Disease Sister 77    Diabetes Brother         had amputation    High Blood Pressure Brother     Cancer Father        CareTeam (Including outside providers/suppliers regularly involved in providing care):   Patient Care Team:  Brenda Harris MD as PCP - General (Internal Medicine)  Brenda Harris MD as PCP - [facility] Empaneled Provider  Sergio Shepherd MD as Consulting Physician (Ophthalmology)  Avril Catherine MD as Consulting Physician (Gastroenterology) Arnoldo Middleton DPM as Consulting Physician (Podiatry)  Maria L Martinez RD, SAWYER as Diabetic Educator (Dietitian)  Bri Burger MD as Consulting Physician (Otolaryngology)    Wt Readings from Last 3 Encounters:   12/10/20 229 lb 12.8 oz (104.2 kg)   09/15/20 230 lb (104.3 kg)   07/30/20 239 lb (108.4 kg)     Patient-Reported Vitals 11/3/2020   Patient-Reported Weight 234lbs   Patient-Reported Height -      There is no height or weight on file to calculate BMI. Based upon direct observation of the patient, evaluation of cognition reveals recent and remote memory intact. General Appearance: alert and oriented to person, place and time, well-developed and well-nourished, in no acute distress  Head: normocephalic and atraumatic  Eyes: extraocular eye movements intact, no scleral icterus  ENT:external ears normal bilaterally  Neck: neck without mass  Pulmonary: normal air movement, no respiratory distress  Neurologic: speech normal    Patient's complete Health Risk Assessment and screening values have been reviewed and are found in Flowsheets. The following problems were reviewed today and where indicated follow up appointments were made and/or referrals ordered. Positive Risk Factor Screenings with Interventions:      Cognitive: Words recalled: 2 Words Recalled  Total Score Interpretation: Positive Mini-Cog  Cognitive Impairment Interventions:  · Patient declines any further evaluation/treatment for cognitive impairment   · Patient she was distracted listening to TV while she was being asked questions. Patient states she  remembered the first letter but couldn't remember the 3rd word. ·          General Health and ACP:  General  In general, how would you say your health is?: Good  In the past 7 days, have you experienced any of the following?  New or Increased Pain, New or Increased Fatigue, Loneliness, Social Isolation, Stress or Anger?: None of These Do you get the social and emotional support that you need?: Yes  Do you have a Living Will?: (!) No  Advance Directives     Power of [employer] Will ACP-Advance Directive ACP-Power of     Not on File Not on File Not on File Not on File      General Health Risk Interventions:  · No Living Will: Advance Care Planning addressed with patient today    Health Habits/Nutrition:  Health Habits/Nutrition  Do you exercise for at least 20 minutes 2-3 times per week?: (!) No  Have you lost any weight without trying in the past 3 months?: No  Do you eat fewer than 2 meals per day?: No  Have you seen a dentist within the past year?: Yes     Health Habits/Nutrition Interventions:  · Inadequate physical activity:  patient agrees to increase physical activity as follows: Patient states she plans to buy and exercise bike and also do sit ups.      Safety:  Safety  Do you have working smoke detectors?: Yes  Have all throw rugs been removed or fastened?: (!) No  Do you have non-slip mats or surfaces in all bathtubs/showers?: Yes  Do all of your stairways have a railing or banister?: Yes  Are your doorways, halls and stairs free of clutter?: Yes  Do you always fasten your seatbelt when you are in a car?: Yes  Safety Interventions:  · Home safety tips provided        Personalized Preventive Plan   Current Health Maintenance Status  Immunization History   Administered Date(s) Administered    Influenza Vaccine, unspecified formulation 10/02/2015, 12/01/2016    Influenza, High Dose (Fluzone 65 yrs and older) 10/02/2015, 12/01/2016, 12/18/2018, 12/10/2019    Pneumococcal Conjugate 13-valent ([id number]) 12/01/2016    Pneumococcal Polysaccharide ([id number]) 07/10/2015    Tdap (Boostrix, Adacel) 08/18/2010        Health Maintenance   Topic Date Due    Annual Wellness Visit (AWV)  06/20/2019    DTaP/Tdap/Td vaccine (2 - Td) 08/18/2020    Flu vaccine (1) 09/01/2020    Diabetic retinal exam  01/20/2021  Diabetic foot exam  07/28/2021    Diabetic microalbuminuria test  07/28/2021    Breast cancer screen  08/09/2021    A1C test (Diabetic or Prediabetic)  10/27/2021    Lipid screen  10/27/2021    Potassium monitoring  12/10/2021    Creatinine monitoring  12/10/2021    Colon cancer screen colonoscopy  07/20/2028    DEXA (modify frequency per FRAX score)  Completed    Shingles Vaccine  Completed    Pneumococcal 65+ years Vaccine  Completed    Hepatitis C screen  Completed    Hepatitis A vaccine  Aged Out    Hib vaccine  Aged Out    Meningococcal (ACWY) vaccine  Aged Out     Recommendations for [employer] Due: see orders and patient instructions/AVS.  . Recommended screening schedule for the next 5-10 years is provided to the patient in written form: see Patient Instructions/AVS        Ashlee Mitchell was seen today for medicare aw. Diagnoses and all orders for this visit:    1. Routine general medical examination at a health care facility  - Medicare AWV done    2. Urinary tract infection without hematuria, site unspecified  - urinalysis done on 12/10/20 suggest a urinary tract infection  - Culture, Urine; Future  - ciprofloxacin (CIPRO) 250 MG tablet; Take 1 tablet by mouth 2 times daily for 7 days  Dispense: 14 tablet; Refill: 0  -increase water intake      Return in 3 months (on 3/15/2021) for hypertension, hyperlipidemia, vitamin D deficiency, and laryngopharyngeal reflux.

## 2020-12-15 NOTE — PATIENT INSTRUCTIONS
Learning About Living Perroy  What is a living will? A living will, also called a declaration, is a legal form. It tells your family and your doctor your wishes when you can't speak for yourself. It's used by the health professionals who will treat you as you near the end of your life or if you get seriously hurt or ill. If you put your wishes in writing, your loved ones and others will know what kind of care you want. They won't need to guess. This can ease your mind and be helpful to others. And you can change or cancel your living will at any time. A living will is not the same as an estate or property will. An estate will explains what you want to happen with your money and property after you die. How do you use it? A living will is used to describe the kinds of treatment or life support you want as you near the end of your life or if you get seriously hurt or ill. Keep these facts in mind about living solorio. · Your living will is used only if you can't speak or make decisions for yourself. Most often, one or more doctors must certify that you can't speak or decide for yourself before your living will takes effect. · If you get better and can speak for yourself again, you can accept or refuse any treatment. It doesn't matter what you said in your living will. · Some states may limit your right to refuse treatment in certain cases. For example, you may need to clearly state in your living will that you don't want artificial hydration and nutrition, such as being fed through a tube. Is a living will a legal document? A living will is a legal document. Each state has its own laws about living solorio. And a living will may be called something else in your state. Here are some things to know about living solorio. · You don't need an  to complete a living will. But legal advice can be helpful if your state's laws are unclear. It can also help if your health history is complicated or your family can't agree on what should be in your living will. · You can change your living will at any time. Some people find that their wishes about end-of-life care change as their health changes. If you make big changes to your living will, complete a new form. · If you move to another state, make sure that your living will is legal in the state where you now live. In most cases, doctors will respect your wishes even if you have a form from a different state. · You might use a universal form that has been approved by many states. This kind of form can sometimes be filled out and stored online. Your digital copy will then be available wherever you have a connection to the internet. The doctors and nurses who need to treat you can find it right away. · Your state may offer an online registry. This is another place where you can store your living will online. · It's a good idea to get your living will notarized. This means using a person called a  to watch two people sign, or witness, your living will. What should you know when you create a living will? Here are some questions to ask yourself as you make your living will:  · Do you know enough about life support methods that might be used? If not, talk to your doctor so you know what might be done if you can't breathe on your own, your heart stops, or you can't swallow. · What things would you still want to be able to do after you receive life-support methods? Would you want to be able to walk? To speak? To eat on your own? To live without the help of machines? · Do you want certain Denominational practices performed if you become very ill? · If you have a choice, where do you want to be cared for? In your home? At a hospital or nursing home? After reviewing your medical record and screening and assessments performed today your provider may have ordered immunizations, labs, imaging, and/or referrals for you. A list of these orders (if applicable) as well as your Preventive Care list are included within your After Visit Summary for your review. Other Preventive Recommendations:    · A preventive eye exam performed by an eye specialist is recommended every 1-2 years to screen for glaucoma; cataracts, macular degeneration, and other eye disorders. · A preventive dental visit is recommended every 6 months. · Try to get at least 150 minutes of exercise per week or 10,000 steps per day on a pedometer . · Order or download the FREE \"Exercise & Physical Activity: Your Everyday Guide\" from [employer]. Call 5-913.799.4758 or search [employer] online. · You need 6205-6703 mg of calcium and 1419-9557 IU of vitamin D per day. It is possible to meet your calcium requirement with diet alone, but a vitamin D supplement is usually necessary to meet this goal.  · When exposed to the sun, use a sunscreen that protects against both UVA and UVB radiation with an SPF of 30 or greater. Reapply every 2 to 3 hours or after sweating, drying off with a towel, or swimming. · Always wear a seat belt when traveling in a car. Always wear a helmet when riding a bicycle or motorcycle. Patient Education        Preventing Falls: Care Instructions  Your Care Instructions     Getting around your home safely can be a challenge if you have injuries or health problems that make it easy for you to fall. Loose rugs and furniture in walkways are among the dangers for many older people who have problems walking or who have poor eyesight. People who have conditions such as arthritis, osteoporosis, or dementia also have to be careful not to fall. You can make your home safer with a few simple measures. Follow-up care is a key part of your treatment and safety. Be sure to make and go to all appointments, and call your doctor if you are having problems. It's also a good idea to know your test results and keep a list of the medicines you take. How can you care for yourself at home? Taking care of yourself  · You may get dizzy if you do not drink enough water. To prevent dehydration, drink plenty of fluids, enough so that your urine is light yellow or clear like water. Choose water and other caffeine-free clear liquids. If you have kidney, heart, or liver disease and have to limit fluids, talk with your doctor before you increase the amount of fluids you drink. · Exercise regularly to improve your strength, muscle tone, and balance. Walk if you can. Swimming may be a good choice if you cannot walk easily. · Have your vision and hearing checked each year or any time you notice a change. If you have trouble seeing and hearing, you might not be able to avoid objects and could lose your balance. · Know the side effects of the medicines you take. Ask your doctor or pharmacist whether the medicines you take can affect your balance. Sleeping pills or sedatives can affect your balance. · Limit the amount of alcohol you drink. Alcohol can impair your balance and other senses. · Ask your doctor whether calluses or corns on your feet need to be removed. If you wear loose-fitting shoes because of calluses or corns, you can lose your balance and fall. · Talk to your doctor if you have numbness in your feet. Preventing falls at home  · Remove raised doorway thresholds, throw rugs, and clutter. Repair loose carpet or raised areas in the floor. · Move furniture and electrical cords to keep them out of walking paths. · Use nonskid floor wax, and wipe up spills right away, especially on ceramic tile floors. · If you use a walker or cane, put rubber tips on it. If you use crutches, clean the bottoms of them regularly with an abrasive pad, such as steel wool. · Keep your house well lit, especially [place], and outside walkways. Use night-lights in areas such as hallways and bathrooms. Add extra light switches or use remote switches (such as switches that go on or off when you clap your hands) to make it easier to turn lights on if you have to get up during the night. · Install sturdy handrails on stairways. · Move items in your cabinets so that the things you use a lot are on the lower shelves (about waist level). · Keep a cordless phone and a flashlight with new batteries by your bed. If possible, put a phone in each of the main rooms of your house, or carry a cell phone in case you fall and cannot reach a phone. Or, you can wear a device around your neck or wrist. You push a button that sends a signal for help. · Wear low-heeled shoes that fit well and give your feet good support. Use footwear with nonskid soles. Check the heels and soles of your shoes for wear. Repair or replace worn heels or soles. · Do not wear socks without shoes on wood floors. · Walk on the grass when the sidewalks are slippery. If you live in an area that gets snow and ice in the winter, sprinkle salt on slippery steps and sidewalks. Preventing falls in the bath  · Install grab bars and nonskid mats inside and outside your shower or tub and near the toilet and sinks. · Use shower chairs and bath benches. · Use a hand-held shower head that will allow you to sit while showering. · Get into a tub or shower by putting the weaker leg in first. Get out of a tub or shower with your strong side first.  · Repair loose toilet seats and consider installing a raised toilet seat to make getting on and off the toilet easier. · Keep your bathroom door unlocked while you are in the shower. Where can you learn more? Go to [email or web address] and sign in to your MyChart account. Enter [phone] [id number] [phone] in the [facility] box to learn more about \"Preventing Falls: Care Instructions. \"     If you do not have an account, please click on the \"Sign Up Now\" link. Current as of: April 15, 2020               Content Version: 12.6  © 2334-2944 [employer]. Care instructions adapted under license by [facility] ([facility]). If you have questions about a medical condition or this instruction, always ask your healthcare professional. [place] [address] any warranty or liability for your use of this information.

## 2020-12-17 LAB
ORGANISM: ABNORMAL
URINE CULTURE, ROUTINE: ABNORMAL

## 2020-12-18 RX ORDER — CIPROFLOXACIN 250 MG/1
250 TABLET, FILM COATED ORAL 2 TIMES DAILY
Qty: 14 TABLET | Refills: 0 | Status: SHIPPED | OUTPATIENT
Start: 2020-12-18 | End: 2020-12-25

## 2020-12-19 ENCOUNTER — NURSE TRIAGE (OUTPATIENT)
Dept: OTHER | Facility: CLINIC | Age: 70
End: 2020-12-19

## 2020-12-19 NOTE — TELEPHONE ENCOUNTER
Started on BP medications amplodapine  takes once a day her BP was high a week ago and her Kidney MD added amlodipine besylate and started with headaches and a popping sounds in ears        Patient called pre-service center [facility] with red flag complaint. Brief description of triage: new medication side effects     Triage indicates for patient to call PCP if open or call pharmacist in 24 hours and page MD on call to advise on medications     Care advice provided, patient verbalizes understanding; denies any other questions or concerns; instructed to call back for any new or worsening symptoms. Writer provided warm transfer to [facility] for appointment scheduling. Attention Provider: Thank you for allowing me to participate in the care of your patient. The patient was connected to triage in response to information provided to the [facility]. Please do not respond through this encounter as the response is not directed to a shared pool. Reason for Disposition   [1] Caller has NON-URGENT medication question about med that PCP prescribed AND [2] triager unable to answer question    Answer Assessment - Initial Assessment Questions  1. NAME of MEDICATION: \"What medicine are you calling about? \"      Losartan and amlodipine     2. QUESTION: Tiffanie Yao is your question? \"      Does it cause side effects of popping ears and headache     3. PRESCRIBING HCP: \"Who prescribed it? \" Reason: if prescribed by specialist, call should be referred to that group. Primary care ordered the losartan and nephrologist ordered amlodipine she has been on losartan for years with no problems and has new onset of headache and popping ears      4. SYMPTOMS: \"Do you have any symptoms? \"      Popping in ears and headache     5. SEVERITY: If symptoms are present, ask \"Are they mild, moderate or severe? \"      Severe     6. PREGNANCY:  \"Is there any chance that you are pregnant? \" \"When was your last menstrual period? \" Na    Protocols used: MEDICATION QUESTION CALL-ADULT-AH

## 2020-12-20 ENCOUNTER — TELEPHONE (OUTPATIENT)
Dept: PRIMARY CARE CLINIC | Age: 70
End: 2020-12-20

## 2020-12-20 DIAGNOSIS — I10 ESSENTIAL HYPERTENSION: Primary | ICD-10-CM

## 2020-12-20 RX ORDER — VERAPAMIL HYDROCHLORIDE 100 MG/1
1 CAPSULE, EXTENDED RELEASE ORAL DAILY
Qty: 30 CAPSULE | Refills: 0 | Status: SHIPPED | OUTPATIENT
Start: 2020-12-20 | End: 2021-01-14

## 2020-12-21 RX ORDER — VERAPAMIL HYDROCHLORIDE 100 MG/1
1 CAPSULE, EXTENDED RELEASE ORAL DAILY
Qty: 90 CAPSULE | OUTPATIENT
Start: 2020-12-21

## 2020-12-21 NOTE — TELEPHONE ENCOUNTER
Medication:   Requested Prescriptions     Pending Prescriptions Disp Refills    verapamil (VERELAN PM) 100 MG CP24 extended release capsule [Pharmacy Med Name: VERAPAMIL ER 100MG CAPSULES (24 HR)] 90 capsule      Sig: TAKE 1 CAPSULE BY MOUTH DAILY     Last Filled: 12.20.20    Last appt: 12/15/2020   Next appt: Visit date not found    Last OARRS: No flowsheet data found.

## 2020-12-22 PROBLEM — N39.0 URINARY TRACT INFECTION WITHOUT HEMATURIA: Status: ACTIVE | Noted: 2020-12-22

## 2020-12-23 ENCOUNTER — TELEPHONE (OUTPATIENT)
Dept: PRIMARY CARE CLINIC | Age: 70
End: 2020-12-23

## 2020-12-23 NOTE — TELEPHONE ENCOUNTER
Call patient. Her blood pressure readings are good. She needs to continue the Verapamil 100mg once daily and Losartan-HCTZ 100-25mg once daily.

## 2021-01-07 DIAGNOSIS — E55.9 VITAMIN D DEFICIENCY: ICD-10-CM

## 2021-01-07 RX ORDER — ERGOCALCIFEROL 1.25 MG/1
CAPSULE ORAL
Qty: 12 CAPSULE | Refills: 1 | Status: SHIPPED | OUTPATIENT
Start: 2021-01-07 | End: 2021-07-07

## 2021-01-07 NOTE — TELEPHONE ENCOUNTER
Medication:   Requested Prescriptions     Pending Prescriptions Disp Refills    vitamin D (ERGOCALCIFEROL) 1.25 MG (47824 UT) CAPS capsule [Pharmacy Med Name: VITAMIN D2 50,000IU (ERGO) CAP RX] 12 capsule 1     Sig: TAKE 1 CAPSULE BY MOUTH 1 TIME A WEEK     Last Filled:  6.26.20    Last appt: 12/15/2020   Next appt: Visit date not found    Last OARRS: No flowsheet data found.

## 2021-01-11 ENCOUNTER — TELEPHONE (OUTPATIENT)
Dept: ENDOCRINOLOGY | Age: 71
End: 2021-01-11

## 2021-01-11 RX ORDER — INSULIN GLARGINE 100 [IU]/ML
28 INJECTION, SOLUTION SUBCUTANEOUS 2 TIMES DAILY
Qty: 3 VIAL | Refills: 2
Start: 2021-01-11 | End: 2021-07-15 | Stop reason: SDUPTHER

## 2021-01-11 RX ORDER — INSULIN LISPRO 100 [IU]/ML
INJECTION, SOLUTION INTRAVENOUS; SUBCUTANEOUS
Qty: 30 ML | Refills: 3
Start: 2021-01-11 | End: 2022-01-31 | Stop reason: DRUGHIGH

## 2021-01-11 NOTE — TELEPHONE ENCOUNTER
Patient left a voicemail stating her blood sugars have been running 190-240 and she needs to know what to do. .      Former [facility] patient following up with [employer]

## 2021-01-11 NOTE — TELEPHONE ENCOUNTER
I spoke with patient. Because of hypoglycemic episodes, her Lantus dose was decreased by Dr. Lynne Aguirre from 35 units twice a day to 25 units twice a day. I advised to increase Lantus to 28 units twice a day. Patient stated that before she was on Humalog sliding scale, and later it was changed to 6 units before meals without sliding scale. I advised to increase Humalog to 7 units before meals. Patient is stating that currently she was not told to do insulin sliding scale. She will call me in 3 days with the blood glucose levels and I will make adjustments again.

## 2021-01-12 ENCOUNTER — TELEPHONE (OUTPATIENT)
Dept: ENDOCRINOLOGY | Age: 71
End: 2021-01-12

## 2021-01-12 NOTE — TELEPHONE ENCOUNTER
Kelsy Rodanthe called stating dereje did not receive her refills for Lantus vials and Humalog Bevely Ramp. I called dereje and gave verbal for both. RX were placed as adjust sig under refill which is why it did not go through.

## 2021-01-13 ENCOUNTER — TELEPHONE (OUTPATIENT)
Dept: ENDOCRINOLOGY | Age: 71
End: 2021-01-13

## 2021-01-14 DIAGNOSIS — K21.9 LARYNGOPHARYNGEAL REFLUX DISEASE: ICD-10-CM

## 2021-01-14 DIAGNOSIS — I10 ESSENTIAL HYPERTENSION: ICD-10-CM

## 2021-01-14 RX ORDER — VERAPAMIL HYDROCHLORIDE 100 MG/1
1 CAPSULE, EXTENDED RELEASE ORAL DAILY
Qty: 90 CAPSULE | Refills: 0 | Status: SHIPPED | OUTPATIENT
Start: 2021-01-14 | End: 2021-04-22

## 2021-01-14 RX ORDER — FAMOTIDINE 20 MG/1
20 TABLET, FILM COATED ORAL DAILY
Qty: 90 TABLET | Refills: 1 | Status: SHIPPED | OUTPATIENT
Start: 2021-01-14 | End: 2021-08-03

## 2021-01-14 NOTE — TELEPHONE ENCOUNTER
Left patient a message earlier today after receiving call and called again regarding her blood glucose. Left a message to let me know her blood glucose numbers and also I will call her again tomorrow.

## 2021-01-14 NOTE — TELEPHONE ENCOUNTER
Medication:   Requested Prescriptions     Pending Prescriptions Disp Refills    verapamil (VERELAN PM) 100 MG CP24 extended release capsule [Pharmacy Med Name: VERAPAMIL ER 100MG CAPSULES (24 HR)] 30 capsule 0     Sig: TAKE 1 CAPSULE BY MOUTH DAILY    famotidine (PEPCID) 20 MG tablet [Pharmacy Med Name: FAMOTIDINE 20MG TABLETS] 90 tablet 1     Sig: TAKE 1 TABLET BY MOUTH DAILY       Last Filled:      Patient Phone Number: [phone] (home)     Last appt: 12/15/2020   Next appt: Visit date not found    Last BMP:   Lab Results   Component Value Date     12/10/2020    K 3.9 12/10/2020    CL 99 12/10/2020    CO2 29 12/10/2020    ANIONGAP 11 12/10/2020    GLUCOSE 231 12/10/2020    BUN 21 12/10/2020    CREATININE 1.0 12/10/2020    LABGLOM 55 12/10/2020    GFRAA >60 12/10/2020    GFRAA >60 04/04/2013    CALCIUM 9.1 12/10/2020      Last CMP:   Lab Results   Component Value Date     12/10/2020    K 3.9 12/10/2020    CL 99 12/10/2020    CO2 29 12/10/2020    ANIONGAP 11 12/10/2020    GLUCOSE 231 12/10/2020    BUN 21 12/10/2020    CREATININE 1.0 12/10/2020    LABGLOM 55 12/10/2020    GFRAA >60 12/10/2020    GFRAA >60 04/04/2013    PROT 7.0 12/10/2020    PROT 7.1 01/23/2012    LABALBU 3.1 12/10/2020    AGRATIO 1.1 10/27/2020    BILITOT 0.3 10/27/2020    ALKPHOS 92 10/27/2020    ALT 17 10/27/2020    AST 19 10/27/2020    GLOB 3.3 10/27/2020     Last Renal Function:   Lab Results   Component Value Date     12/10/2020    K 3.9 12/10/2020    CL 99 12/10/2020    CO2 29 12/10/2020    GLUCOSE 231 12/10/2020    BUN 21 12/10/2020    CREATININE 1.0 12/10/2020    LABALBU 3.1 12/10/2020    CALCIUM 9.1 12/10/2020    GFR 60 04/04/2013    GFRAA >60 12/10/2020    GFRAA >60 04/04/2013       Last OARRS: No flowsheet data found.     Preferred Pharmacy:   [facility] [address] [facility], [address] [place] [address]  Phone: [phone] Fax: [phone]

## 2021-01-20 RX ORDER — DULAGLUTIDE 1.5 MG/.5ML
INJECTION, SOLUTION SUBCUTANEOUS
Qty: 2 ML | Refills: 5 | Status: SHIPPED | OUTPATIENT
Start: 2021-01-20 | End: 2021-07-15 | Stop reason: SDUPTHER

## 2021-01-20 NOTE — TELEPHONE ENCOUNTER
Medication:   Requested Prescriptions     Pending Prescriptions Disp Refills    TRULICITY 1.5 SP/0.6KW SOPN [Pharmacy Med Name: Francisco Hercules 8.7TP/2.4DJ SDP 0.5ML] 2 mL 5     Sig: ADMINISTER 0.5 ML(1.5MG) UNDER THE SKIN 1 TIME A WEEK       Last Filled:      Patient Phone Number: [phone] (home)     Last appt: 12/15/2020   Next appt: Visit date not found    Last Labs DM:   Lab Results   Component Value Date    LABA1C 7.8 10/27/2020       Last OARRS: No flowsheet data found.     Preferred Pharmacy:   [address] [address]  Phone: [phone] Fax: [phone]

## 2021-01-27 ENCOUNTER — TELEPHONE (OUTPATIENT)
Dept: PRIMARY CARE CLINIC | Age: 71
End: 2021-01-27

## 2021-02-01 NOTE — TELEPHONE ENCOUNTER
Spoke with patient on 12/20/20 regarding blood pressure. Rx for Verapamil 100mg po q day sent to her pharmacy.

## 2021-02-22 ENCOUNTER — TELEPHONE (OUTPATIENT)
Dept: PRIMARY CARE CLINIC | Age: 71
End: 2021-02-22

## 2021-02-22 DIAGNOSIS — R11.0 NAUSEA: Primary | ICD-10-CM

## 2021-02-22 RX ORDER — PROMETHAZINE HYDROCHLORIDE 25 MG/1
25 TABLET ORAL EVERY 6 HOURS PRN
Qty: 12 TABLET | Refills: 0 | Status: SHIPPED | OUTPATIENT
Start: 2021-02-22 | End: 2022-03-03

## 2021-02-23 NOTE — TELEPHONE ENCOUNTER
Spoke with patient after hours. Patient states she changed her diet and started drinking herbal tea to clean her system out on Monday- Wednesday of last week. Patient states on Thursday she ate [employer] cajun food and chocolate candy. Patient states on Friday morning 2/19/21 she started with diarrhea that was watery. Patient states she called the On call Doctor on Saturday and was told to double up on Immodium. Patient states she increased the Immodium on Sunday which helped the diarrhea. Patient states her diarrhea resolved overnight. Patient states this morning her stomach aching. Patient states she took 2 more Immodium to make sure she wouldn't have diarrhea and Pepto Bismol. Patient states she has been nauseated all day. Patient denies vomiting, fever, urinary symptoms, and abdominal pain. Patient wants something for nausea. Patient states she ate banana, oatmeal, and toast for breakfast. Patient states she bought an [employer] roasted turkey with swiss cheese for lunch but it was too salty and she only ate a couple of bites. Patient states she has been drinking Gingerale which helped Sent Rx for Promethazine to patient's pharmacy. Patient needs to be seen if no improvement. Patient to stop detox teas.

## 2021-02-24 DIAGNOSIS — E78.2 MIXED HYPERLIPIDEMIA: ICD-10-CM

## 2021-02-24 DIAGNOSIS — I10 ESSENTIAL HYPERTENSION: ICD-10-CM

## 2021-02-25 RX ORDER — ROSUVASTATIN CALCIUM 20 MG/1
20 TABLET, COATED ORAL DAILY
Qty: 90 TABLET | Refills: 1 | Status: SHIPPED | OUTPATIENT
Start: 2021-02-25 | End: 2021-09-07

## 2021-02-25 RX ORDER — LOSARTAN POTASSIUM AND HYDROCHLOROTHIAZIDE 25; 100 MG/1; MG/1
1 TABLET ORAL DAILY
Qty: 90 TABLET | Refills: 1 | Status: SHIPPED | OUTPATIENT
Start: 2021-02-25 | End: 2021-09-07

## 2021-02-25 NOTE — TELEPHONE ENCOUNTER
Medication:   Requested Prescriptions     Pending Prescriptions Disp Refills    losartan-hydroCHLOROthiazide (HYZAAR) 100-25 MG per tablet [Pharmacy Med Name: LOSARTAN/HCTZ 100/25MG TABLETS] 90 tablet 1     Sig: TAKE 1 TABLET BY MOUTH DAILY    rosuvastatin (CRESTOR) 20 MG tablet [Pharmacy Med Name: ROSUVASTATIN 20MG TABLETS] 90 tablet 1     Sig: TAKE 1 TABLET BY MOUTH DAILY     Last Filled: 8.31.20    Last appt: 12/15/2020   Next appt: Visit date not found    Last Lipid:   Lab Results   Component Value Date    CHOL 112 10/27/2020    TRIG 128 10/27/2020    HDL 36 10/27/2020    HDL 43 01/23/2012    LDLCALC 50 10/27/2020

## 2021-03-01 ENCOUNTER — TELEPHONE (OUTPATIENT)
Dept: PRIMARY CARE CLINIC | Age: 71
End: 2021-03-01

## 2021-03-01 NOTE — TELEPHONE ENCOUNTER
Patient is wanting your opinion on which covid vaccine would be best fit for her medical conditions. She is nervous and wants to speak with you personally.

## 2021-03-05 NOTE — TELEPHONE ENCOUNTER
Spoke with patient. Told her that either vaccine Moderna or Pfizer that is available at the time of her appointment is ok for her to get.

## 2021-03-17 ENCOUNTER — OFFICE VISIT (OUTPATIENT)
Dept: ENDOCRINOLOGY | Age: 71
End: 2021-03-17
Payer: MEDICARE

## 2021-03-17 VITALS
TEMPERATURE: 98 F | RESPIRATION RATE: 14 BRPM | WEIGHT: 223 LBS | HEIGHT: 66 IN | SYSTOLIC BLOOD PRESSURE: 128 MMHG | OXYGEN SATURATION: 96 % | BODY MASS INDEX: 35.84 KG/M2 | DIASTOLIC BLOOD PRESSURE: 61 MMHG | HEART RATE: 77 BPM

## 2021-03-17 DIAGNOSIS — E66.01 CLASS 2 SEVERE OBESITY WITH SERIOUS COMORBIDITY AND BODY MASS INDEX (BMI) OF 35.0 TO 35.9 IN ADULT, UNSPECIFIED OBESITY TYPE (HCC): ICD-10-CM

## 2021-03-17 DIAGNOSIS — I10 ESSENTIAL HYPERTENSION: ICD-10-CM

## 2021-03-17 DIAGNOSIS — E78.2 MIXED HYPERLIPIDEMIA: ICD-10-CM

## 2021-03-17 DIAGNOSIS — E55.9 VITAMIN D DEFICIENCY: ICD-10-CM

## 2021-03-17 PROCEDURE — 3046F HEMOGLOBIN A1C LEVEL >9.0%: CPT | Performed by: INTERNAL MEDICINE

## 2021-03-17 PROCEDURE — 4040F PNEUMOC VAC/ADMIN/RCVD: CPT | Performed by: INTERNAL MEDICINE

## 2021-03-17 PROCEDURE — 1036F TOBACCO NON-USER: CPT | Performed by: INTERNAL MEDICINE

## 2021-03-17 PROCEDURE — G8399 PT W/DXA RESULTS DOCUMENT: HCPCS | Performed by: INTERNAL MEDICINE

## 2021-03-17 PROCEDURE — G8427 DOCREV CUR MEDS BY ELIG CLIN: HCPCS | Performed by: INTERNAL MEDICINE

## 2021-03-17 PROCEDURE — 3017F COLORECTAL CA SCREEN DOC REV: CPT | Performed by: INTERNAL MEDICINE

## 2021-03-17 PROCEDURE — 2022F DILAT RTA XM EVC RTNOPTHY: CPT | Performed by: INTERNAL MEDICINE

## 2021-03-17 PROCEDURE — 1090F PRES/ABSN URINE INCON ASSESS: CPT | Performed by: INTERNAL MEDICINE

## 2021-03-17 PROCEDURE — G8417 CALC BMI ABV UP PARAM F/U: HCPCS | Performed by: INTERNAL MEDICINE

## 2021-03-17 PROCEDURE — G8484 FLU IMMUNIZE NO ADMIN: HCPCS | Performed by: INTERNAL MEDICINE

## 2021-03-17 PROCEDURE — 99215 OFFICE O/P EST HI 40 MIN: CPT | Performed by: INTERNAL MEDICINE

## 2021-03-17 PROCEDURE — 1123F ACP DISCUSS/DSCN MKR DOCD: CPT | Performed by: INTERNAL MEDICINE

## 2021-03-17 RX ORDER — UREA 200 MG/G
GEL TOPICAL PRN
COMMUNITY
Start: 2020-12-03

## 2021-03-17 NOTE — PROGRESS NOTES
Yohana Estrella is a 79 y.o. female who is being evaluated for Type 2 diabetes mellitus. H.    Current symptoms/problems include hperglycemia and are worsening. Type 2 diabetes mellitus, uncontrolled, with neuropathy (HCC) [E11.40, E11.65]    Diagnosed with Type 2 diabetes mellitus in . Comorbid conditions: Hypertension, hyperlipidemia, obesity, Neuropathy, Nephropathy and Retinopathy    Current diabetic medications include: Lantus 28 units twice daily, Humalog 7 units 3 times daily with meals, Trulicity 1.5 mg weekly  Correction scale of Humalo-200-2 units, 201-250-4 units, 251-300-6 units, >301 -units 8 units   Intolerance to diabetes medications: Yes Actos-leg swelling, Metformin     Weight trend: stable  Prior visit with dietician: yes  Current diet: on average, 2 meals per day  Current exercise: no     Current monitoring regimen: home blood tests - 4 times daily  Has brought blood glucose log/meter:  No  Home blood sugar records: fasting range: 100-130 and postprandial range: 100-130  Any episodes of hypoglycemia? No  Hypoglycemia frequency and time(s):    Does patient have Glucagon emergency kit? No  Does patient have rapid acting carbohydrate? No  Does patient wear a medic alert bracelet or necklace? No      2. Uncontrolled type 2 diabetes mellitus with diabetic nephropathy (HCC)  No urination problems    3. Type 2 diabetes, uncontrolled, with retinopathy ([address]  No vision changes    4. Vitamin D deficiency  No fatigue    5. Mixed hyperlipidemia  No muscle pain    6. Essential hypertension  No headaches    7.  Class 2 severe obesity with serious comorbidity and body mass index (BMI) of 35.0 to 35.9 in adult, unspecified obesity type ([address]  Eats healthy      Past Medical History:   Diagnosis Date    Dizziness     Hearing loss     Hyperlipidemia     Hypertension     Laryngopharyngeal reflux disease 2019    Morbidly obese ([address] 10/29/2020    Tinnitus     Type II or unspecified type diabetes mellitus without mention of complication, not stated as uncontrolled     Uncontrolled type 2 diabetes mellitus with diabetic nephropathy ([facility] 3/17/2021    Urinary, incontinence, stress female 2020    Vitamin D deficiency 2019      Patient Active Problem List   Diagnosis    HTN (hypertension)    Hyperlipidemia    Diabetes mellitus ([address]    Non-compliance with treatment    Diabetes mellitus type 2, uncontrolled ([address]    Bilateral impacted cerumen    Vitamin D deficiency    Laryngopharyngeal reflux disease    Cataract of both eyes    Age-related cataract of right eye    Urinary, incontinence, stress female    Uncontrolled type 2 diabetes mellitus with complication, with long-term current use of insulin ([address]    Morbidly obese ([address]    Urinary tract infection without hematuria    Type 2 diabetes mellitus, uncontrolled, with neuropathy ([address]    Uncontrolled type 2 diabetes mellitus with diabetic nephropathy (HCC)    Type 2 diabetes, uncontrolled, with retinopathy ([address]    Hypertension    Class 2 obesity with body mass index (BMI) of 37.0 to 37.9 in adult     Past Surgical History:   Procedure Laterality Date     SECTION      x 1     EYE SURGERY      right eye- cataract     TONSILLECTOMY       Social History     Socioeconomic History    Marital status: Single     Spouse name: Not on file    Number of children: Not on file    Years of education: Not on file    Highest education level: Not on file   Occupational History    Occupation:  at [employer] resource strain: Not on file    Food insecurity     Worry: Not on file     Inability: Not on file   [employer] needs     Medical: Not on file     Non-medical: Not on file   Tobacco Use    Smoking status: Never Smoker    Smokeless tobacco: Never Used   Substance and Sexual Activity    Alcohol use: No    Drug use: No    Sexual activity: Not Currently Lifestyle    Physical activity     Days per week: Not on file     Minutes per session: Not on file    Stress: Not on file   Relationships    Social connections     Talks on phone: Not on file     Gets together: Not on file     Attends Spiritism service: Not on file     Active member of club or organization: Not on file     Attends meetings of clubs or organizations: Not on file     Relationship status: Not on file    Intimate partner violence     Fear of current or ex partner: Not on file     Emotionally abused: Not on file     Physically abused: Not on file     Forced sexual activity: Not on file   Other Topics Concern    Not on file   Social History Narrative    Not on file     Family History   Problem Relation Age of Onset    Diabetes Sister     Heart Disease Sister 77    Diabetes Brother         had amputation    High Blood Pressure Brother     Cancer Father      Current Outpatient Medications   Medication Sig Dispense Refill    ciclopirox (LOPROX) 0.77 % cream APPLY TO GREAT TOES DAILY      urea (CARMOL) 20 % cream Apply topically as needed      GABAPENTIN PO Take by mouth nightly      losartan-hydroCHLOROthiazide (HYZAAR) 100-25 MG per tablet TAKE 1 TABLET BY MOUTH DAILY 90 tablet 1    rosuvastatin (CRESTOR) 20 MG tablet TAKE 1 TABLET BY MOUTH DAILY 90 tablet 1    promethazine (PHENERGAN) 25 MG tablet Take 1 tablet by mouth every 6 hours as needed for Nausea 12 tablet 0    TRULICITY 1.5 LO/4.2JX SOPN ADMINISTER 0.5 ML(1.5MG) UNDER THE SKIN 1 TIME A WEEK 2 mL 5    verapamil (VERELAN PM) 100 MG CP24 extended release capsule TAKE 1 CAPSULE BY MOUTH DAILY 90 capsule 0    famotidine (PEPCID) 20 MG tablet TAKE 1 TABLET BY MOUTH DAILY 90 tablet 1    insulin lispro, 1 Unit Dial, (HUMALOG KWIKPEN) 100 UNIT/ML SOPN Inject 7 units TID with meals +SSI 30 mL 3    insulin glargine (LANTUS) 100 UNIT/ML injection vial Inject 28 Units into the skin 2 times daily 3 vial 2    vitamin D (ERGOCALCIFEROL) 1.25 MG (66965 UT) CAPS capsule TAKE 1 CAPSULE BY MOUTH 1 TIME A WEEK 12 capsule 1    blood glucose test strips (ACCU-CHEK ANJU PLUS) strip TEST BLOOD SUGAR THREE TIMES DAILY 300 strip 2    Insulin Syringe-Needle U-100 (INSULIN SYRINGE 1CC/31GX5/16\") 31G X 5/16\" 1 ML MISC Use five times daily. 200 each 2    UNKNOWN TO PATIENT Yellow and orange capped eye drops for BP in eye      Insulin Pen Needle (B-D UF III MINI PEN NEEDLES) 31G X 5 MM MISC 1 each by Does not apply route 2 times daily 100 each 6    ACCU-CHEK SOFTCLIX LANCETS MISC USE FOUR TIMES DAILY 300 each 6    Blood Glucose Monitoring Suppl (ACCU-CHEK ANJU PLUS) W/DEVICE KIT Use to monitor blood sugars 1 kit 0     No current facility-administered medications for this visit.       Allergies   Allergen Reactions    Januvia [Sitagliptin Phosphate] Swelling     Side effects    Actos [Pioglitazone] Diarrhea    Avandia [Rosiglitazone] Diarrhea and Nausea Only    Dye [Iodides] Rash    Jardiance [Empagliflozin] Diarrhea    Metformin Diarrhea     Nausea       Family Status   Relation Name Status    Sister  Alive    Brother  Alive    Mother   at age 28        unknown    Father   at age 39        lung       Lab Review:    Lab Results   Component Value Date    WBC 10.7 12/10/2020    HGB 11.0 12/10/2020    HCT 32.4 12/10/2020    MCV 90.8 12/10/2020     12/10/2020     Lab Results   Component Value Date     12/10/2020    K 3.9 12/10/2020    CL 99 12/10/2020    CO2 29 12/10/2020    BUN 21 12/10/2020    CREATININE 1.0 12/10/2020    GLUCOSE 231 12/10/2020    CALCIUM 9.1 12/10/2020    PROT 7.0 12/10/2020    PROT 7.1 2012    LABALBU 3.1 12/10/2020    BILITOT 0.3 10/27/2020    ALKPHOS 92 10/27/2020    AST 19 10/27/2020    ALT 17 10/27/2020    LABGLOM 55 12/10/2020    GFRAA >60 12/10/2020    GFRAA >60 2013    AGRATIO 1.1 10/27/2020    GLOB 3.3 10/27/2020     Lab Results   Component Value Date    TSH 1.01 2018     Lab Results   Component Value Date    LABA1C 7.8 10/27/2020     Lab Results   Component Value Date    .2 10/27/2020     Lab Results   Component Value Date    CHOL 112 10/27/2020     Lab Results   Component Value Date    TRIG 128 10/27/2020     Lab Results   Component Value Date    HDL 36 10/27/2020    HDL 43 01/23/2012     Lab Results   Component Value Date    LDLCALC 50 10/27/2020     Lab Results   Component Value Date    LABVLDL 26 10/27/2020     No results found for: [facility]  Lab Results   Component Value Date    LABMICR YES 12/15/2020     Lab Results   Component Value Date    VITD25 47.8 12/10/2020        Review of Systems:  Constitutional: no fatigue, no fever, no recent weight gain, no recent weight loss, no changes in appetite  Eyes: no eye pain, no change in vision, no eye redness, no eye irritation, no double vision  Ears, nose, throat: no nasal congestion, no sore throat, no earache, no decrease in hearing, no hoarseness, no dry mouth, no sinus problems, no difficulty swallowing, no neck lumps, no dental problems, no mouth sores, no ringing in ears  Pulmonary: no shortness of breath, no wheezing, no dyspnea on exertion, no cough  Cardiovascular: no chest pain, no lower extremity edema, no orthopnea, no intermittent leg claudication, no palpitations  Gastrointestinal: no abdominal pain, no nausea, no vomiting, no diarrhea, no constipation, no dysphagia, no heartburn, no bloating  Genitourinary: no dysuria, no urinary incontinence, no urinary hesitancy, no urinary frequency, no feelings of urinary urgency, no nocturia  Musculoskeletal: no joint swelling, no joint stiffness, no joint pain, no muscle cramps, no muscle pain, no bone pain  Integument/Breast: no hair loss, no skin rashes, no skin lesions, no itching, no dry skin  Neurological: no numbness, no tingling, no weakness, no confusion, no headaches, no dizziness, no fainting, no tremors, no decrease in memory, no balance problems  Psychiatric: no anxiety, no depression, no insomnia  Hematologic/Lymphatic: no tendency for easy bleeding, no swollen lymph nodes, no tendency for easy bruising  Immunology: no seasonal allergies, no frequent infections, no frequent illnesses  Endocrine: no temperature intolerance    /61   Pulse 77   Temp 98 °F (36.7 °C)   Resp 14   Ht 5' 6\" (1.676 m)   Wt 223 lb (101.2 kg)   SpO2 96%   BMI 35.99 kg/m²    Wt Readings from Last 3 Encounters:   03/17/21 223 lb (101.2 kg)   12/10/20 229 lb 12.8 oz (104.2 kg)   09/15/20 230 lb (104.3 kg)     Body mass index is 35.99 kg/m².       OBJECTIVE:  Constitutional: no acute distress, well appearing and well nourished  Psychiatric: oriented to person, place and time, judgement and insight and normal, recent and remote memory and intact and mood and affect are normal  Skin: skin and subcutaneous tissue is normal without mass, normal turgor  Head and Face: examination of head and face revealed no abnormalities  Eyes: no lid or conjunctival swelling, erythema or discharge, pupils are normal, equal, round, reactive to light  Ears/Nose: external inspection of ears and nose revealed no abnormalities, hearing is grossly normal  Oropharynx/Mouth/Face: lips, tongue and gums are normal with no lesions, the voice quality was normal  Neck: neck is supple and symmetric, with midline trachea and no masses, thyroid is normal  Lymphatics: normal cervical lymph nodes, normal supraclavicular nodes  Pulmonary: no increased work of breathing or signs of respiratory distress, lungs are clear to auscultation  Cardiovascular: normal heart rate and rhythm, normal S1 and S2, no murmurs and pedal pulses and 2+ bilaterally, No edema  Abdomen: abdomen is soft, non-tender with no masses  Musculoskeletal: normal gait and station and exam of the digits and nails are normal  Neurological: normal coordination and normal general cortical function    Visual inspection:  Deformity/amputation: present - hammer toes  Skin lesions/pre-ulcerative calluses: absent  Edema: right- negative, left- negative    Sensory exam:  Monofilament sensation: normal  (minimum of 5 random plantar locations tested, avoiding callused areas - > 1 area with absence of sensation is + for neuropathy)    Plus at least one of the following:  Pulses: normal  Proprioception: Intact  Vibration (128 Hz): Impaired    ASSESSMENT/PLAN:  1. Type 2 diabetes mellitus, uncontrolled, with neuropathy ([facility]  Send blood glucose records for review  Current diabetic medications include: Lantus 28 units twice daily, Humalog 7 units 3 times daily with meals, Trulicity 1.5 mg weekly  Correction scale of Humalo-200-2 units, 201-250-4 units, 251-300-6 units, >301 -units 8 units   - GABAPENTIN PO; Take by mouth nightly  -  DIABETES FOOT EXAM  - Hemoglobin A1C; Future  - Lipid Panel; Future  - Microalbumin / Creatinine Urine Ratio; Future  - C-Peptide; Future  - Comprehensive Metabolic Panel; Future  - [facility] Individual Diabetes Education (Non Care Coord Patient), [facility]    2. Uncontrolled type 2 diabetes mellitus with diabetic nephropathy ([facility]  Continue monitoring  - Hemoglobin A1C; Future  - Lipid Panel; Future  - Microalbumin / Creatinine Urine Ratio; Future  - C-Peptide; Future  - Comprehensive Metabolic Panel; Future    3. Type 2 diabetes, uncontrolled, with retinopathy ([facility]  Hemoglobin A1c 7.8  Follow with ophthalmology  - Hemoglobin A1C; Future  - Lipid Panel; Future  - Microalbumin / Creatinine Urine Ratio; Future  - C-Peptide; Future  - Comprehensive Metabolic Panel; Future    4. Vitamin D deficiency  25OH vitamin D 47  Vitamin D 50,000 international units weekly  - Vitamin D 25 Hydroxy; Future    5. Mixed hyperlipidemia  Rosuvastatin 20 mg daily  - Lipid Panel; Future    6. Essential hypertension  Verapamil, losartan/hydrochlorthiazide    7.  Class 2 severe obesity with serious comorbidity and body mass index (BMI) of 35.0 to 35.9 in adult,

## 2021-03-22 ENCOUNTER — HOSPITAL ENCOUNTER (OUTPATIENT)
Dept: ULTRASOUND IMAGING | Age: 71
Discharge: HOME | End: 2021-03-22
Payer: MEDICARE

## 2021-03-22 DIAGNOSIS — N18.31 STAGE 3A CHRONIC KIDNEY DISEASE (HCC): ICD-10-CM

## 2021-03-22 DIAGNOSIS — E55.9 VITAMIN D DEFICIENCY: ICD-10-CM

## 2021-03-22 DIAGNOSIS — I10 ESSENTIAL HYPERTENSION: ICD-10-CM

## 2021-03-22 DIAGNOSIS — E78.2 MIXED HYPERLIPIDEMIA: ICD-10-CM

## 2021-03-22 LAB
A/G RATIO: 1.2 (ref 1.1–2.2)
ALBUMIN SERPL-MCNC: 4 G/DL (ref 3.4–5)
ALP BLD-CCNC: 102 U/L (ref 40–129)
ALT SERPL-CCNC: 22 U/L (ref 10–40)
ANION GAP SERPL CALCULATED.3IONS-SCNC: 11 MMOL/L (ref 3–16)
AST SERPL-CCNC: 24 U/L (ref 15–37)
BILIRUB SERPL-MCNC: <0.2 MG/DL (ref 0–1)
BUN BLDV-MCNC: 19 MG/DL (ref 7–20)
CALCIUM SERPL-MCNC: 9.1 MG/DL (ref 8.3–10.6)
CHLORIDE BLD-SCNC: 101 MMOL/L (ref 99–110)
CHOLESTEROL, TOTAL: 112 MG/DL (ref 0–199)
CO2: 30 MMOL/L (ref 21–32)
CREAT SERPL-MCNC: 1.3 MG/DL (ref 0.6–1.2)
CREATININE URINE: 153.8 MG/DL (ref 28–259)
GFR AFRICAN AMERICAN: 49
GFR NON-AFRICAN AMERICAN: 40
GLOBULIN: 3.4 G/DL
GLUCOSE BLD-MCNC: 98 MG/DL (ref 70–99)
HDLC SERPL-MCNC: 43 MG/DL (ref 40–60)
LDL CHOLESTEROL CALCULATED: 46 MG/DL
MICROALBUMIN UR-MCNC: 12.5 MG/DL
MICROALBUMIN/CREAT UR-RTO: 81.3 MG/G (ref 0–30)
POTASSIUM SERPL-SCNC: 4.3 MMOL/L (ref 3.5–5.1)
SODIUM BLD-SCNC: 142 MMOL/L (ref 136–145)
T4 FREE: 1.2 NG/DL (ref 0.9–1.8)
TOTAL PROTEIN: 7.4 G/DL (ref 6.4–8.2)
TRIGL SERPL-MCNC: 117 MG/DL (ref 0–150)
TSH SERPL DL<=0.05 MIU/L-ACNC: 3.48 UIU/ML (ref 0.27–4.2)
VITAMIN D 25-HYDROXY: 47.2 NG/ML
VLDLC SERPL CALC-MCNC: 23 MG/DL

## 2021-03-22 PROCEDURE — 76770 US EXAM ABDO BACK WALL COMP: CPT

## 2021-03-23 ENCOUNTER — VIRTUAL VISIT (OUTPATIENT)
Dept: PRIMARY CARE CLINIC | Age: 71
End: 2021-03-23
Payer: MEDICARE

## 2021-03-23 DIAGNOSIS — R42 DIZZINESS: Primary | ICD-10-CM

## 2021-03-23 DIAGNOSIS — I10 ESSENTIAL HYPERTENSION: ICD-10-CM

## 2021-03-23 LAB
ESTIMATED AVERAGE GLUCOSE: 191.5 MG/DL
HBA1C MFR BLD: 8.3 %

## 2021-03-23 PROCEDURE — G8427 DOCREV CUR MEDS BY ELIG CLIN: HCPCS | Performed by: INTERNAL MEDICINE

## 2021-03-23 PROCEDURE — 1090F PRES/ABSN URINE INCON ASSESS: CPT | Performed by: INTERNAL MEDICINE

## 2021-03-23 PROCEDURE — G8399 PT W/DXA RESULTS DOCUMENT: HCPCS | Performed by: INTERNAL MEDICINE

## 2021-03-23 PROCEDURE — 1123F ACP DISCUSS/DSCN MKR DOCD: CPT | Performed by: INTERNAL MEDICINE

## 2021-03-23 PROCEDURE — 4040F PNEUMOC VAC/ADMIN/RCVD: CPT | Performed by: INTERNAL MEDICINE

## 2021-03-23 PROCEDURE — 99213 OFFICE O/P EST LOW 20 MIN: CPT | Performed by: INTERNAL MEDICINE

## 2021-03-23 PROCEDURE — 3017F COLORECTAL CA SCREEN DOC REV: CPT | Performed by: INTERNAL MEDICINE

## 2021-03-23 RX ORDER — AMLODIPINE BESYLATE 10 MG/1
TABLET ORAL
COMMUNITY
Start: 2021-03-05 | End: 2021-03-23 | Stop reason: ALTCHOICE

## 2021-03-23 RX ORDER — WEIGH SCALE
MISCELLANEOUS MISCELLANEOUS
Qty: 1 EACH | Refills: 0 | Status: SHIPPED | OUTPATIENT
Start: 2021-03-23

## 2021-03-23 ASSESSMENT — PATIENT HEALTH QUESTIONNAIRE - PHQ9: SUM OF ALL RESPONSES TO PHQ QUESTIONS 1-9: 0

## 2021-03-23 NOTE — PROGRESS NOTES
3/23/2021    TELEHEALTH EVALUATION -- Audio/Visual (During [id number]-16 public health emergency)    Chief Complaint   Patient presents with    Dizziness     Patient states she is just not herself, she thinks it is related to BP meds       HPI:    Ochoa Pierre (:  1950) has requested an audio/video evaluation for the following concern(s):    Patient c/o dizziness 3-4 weeks. Patient feels lightheaded. Patient thinks it may be her BP medication. Patient states her blood pressure was taken recently at Endocrinologist. Patient's BP was 128/61 on 3/17/21. Patient states she tries to drink 48 ounces of water per day and 3 cups of tea per day. Patient takes Losartan- HCTZ 100-25mg po q day and Verapamil 100mg po q day for hypertension. Patient states she doesn't add salt. Patient is not exercising. Patient states Nephrology prescribed Amlodipine in December. Patient states she wasn't aware he prescribed it so she never took it. Patient has appointment with Nephrology on 3/25/21. Review of Systems   Constitutional: Negative for chills and fever. HENT: Negative for congestion, postnasal drip, rhinorrhea and sore throat. Eyes: Negative for visual disturbance. Respiratory: Negative for cough, chest tightness and shortness of breath. Cardiovascular: Negative for chest pain, palpitations and leg swelling. Gastrointestinal: Negative for abdominal pain, constipation, diarrhea, nausea and vomiting. Genitourinary: Negative for dysuria, frequency and hematuria. Neurological: Positive for dizziness and light-headedness. Negative for syncope and headaches. Prior to Visit Medications    Medication Sig Taking?  Authorizing Provider   ciclopirox (LOPROX) 0.77 % cream APPLY TO GREAT TOES DAILY Yes Historical Provider, MD   urea (CARMOL) 20 % cream Apply topically as needed Yes Historical Provider, MD   GABAPENTIN PO Take by mouth nightly Yes Historical Provider, MD losartan-hydroCHLOROthiazide (HYZAAR) 100-25 MG per tablet TAKE 1 TABLET BY MOUTH DAILY Yes Damaris Romo MD   rosuvastatin (CRESTOR) 20 MG tablet TAKE 1 TABLET BY MOUTH DAILY Yes Damaris Romo MD   promethazine (PHENERGAN) 25 MG tablet Take 1 tablet by mouth every 6 hours as needed for Nausea Yes Damaris Romo MD   TRULICITY 1.5 PA/4.0TC SOPN ADMINISTER 0.5 ML(1.5MG) UNDER THE SKIN 1 TIME A WEEK Yes Damaris Romo MD   verapamil (VERELAN PM) 100 MG CP24 extended release capsule TAKE 1 CAPSULE BY MOUTH DAILY Yes Damaris Romo MD   famotidine (PEPCID) 20 MG tablet TAKE 1 TABLET BY MOUTH DAILY Yes Damaris Romo MD   insulin lispro, 1 Unit Dial, (HUMALOG KWIKPEN) 100 UNIT/ML SOPN Inject 7 units TID with meals +SSI Yes Reena Woodall MD   insulin glargine (LANTUS) 100 UNIT/ML injection vial Inject 28 Units into the skin 2 times daily Yes Reena Woodall MD   vitamin D (ERGOCALCIFEROL) 1.25 MG (50142 UT) CAPS capsule TAKE 1 CAPSULE BY MOUTH 1 TIME A WEEK Yes Damaris Romo MD   blood glucose test strips (ACCU-CHEK ANJU PLUS) strip TEST BLOOD SUGAR THREE TIMES DAILY Yes Hubert Urena MD   Insulin Syringe-Needle U-100 (INSULIN SYRINGE 1CC/31GX5/16\") 31G X 5/16\" 1 ML MISC Use five times daily.  Yes Hubert Urena MD   UNKNOWN TO PATIENT Yellow and orange capped eye drops for BP in eye Yes Historical Provider, MD   Insulin Pen Needle (B-D UF III MINI PEN NEEDLES) 31G X 5 MM MISC 1 each by Does not apply route 2 times daily Yes Erin Chopra MD   ACCU-CHEK SOFTCLIX LANCETS MISC USE FOUR TIMES DAILY Yes Hubert Urena MD   Blood Glucose Monitoring Suppl (ACCU-CHEK ANJU PLUS) W/DEVICE KIT Use to monitor blood sugars Yes Damaris Romo MD   Blood Pressure Monitoring (BLOOD PRESSURE MONITOR/L CUFF) MISC Use to monitor blood pressure  Damaris Romo MD       Social History     Tobacco Use    Smoking status: Never Smoker    Smokeless tobacco: Never Used   Substance Use Topics    Alcohol use: No    Drug use: No        Allergies   Allergen Reactions    Januvia [Sitagliptin Phosphate] Swelling     Side effects    Actos [Pioglitazone] Diarrhea    Avandia [Rosiglitazone] Diarrhea and Nausea Only    Dye [Iodides] Rash    Jardiance [Empagliflozin] Diarrhea    Metformin Diarrhea     Nausea     ,   Past Medical History:   Diagnosis Date    Dizziness     Hearing loss     Hyperlipidemia     Hypertension     Laryngopharyngeal reflux disease 11/13/2019    Morbidly obese ([facility] 10/29/2020    Tinnitus     Type II or unspecified type diabetes mellitus without mention of complication, not stated as uncontrolled     Uncontrolled type 2 diabetes mellitus with diabetic nephropathy ([facility] 3/17/2021    Urinary, incontinence, stress female 4/28/2020    Vitamin D deficiency 11/13/2019       PHYSICAL EXAMINATION:  [ INSTRUCTIONS:  \"[x]\" Indicates a positive item  \"[]\" Indicates a negative item  -- DELETE ALL ITEMS NOT EXAMINED]  Vital Signs: (As obtained by patient/caregiver or practitioner observation)    Blood pressure-  Heart rate-    Respiratory rate-    Temperature-  Pulse oximetry-   Patient-Reported Vitals 11/3/2020   Patient-Reported Weight 234lbs   Patient-Reported Height -          Constitutional: [x] Appears well-developed and well-nourished [x] No apparent distress      [] Abnormal-   Mental status  [x] Alert and awake  [x] Oriented to person/place/time [x]Able to follow commands      Eyes:  EOM    [x]  Normal  [] Abnormal-  Sclera  [x]  Normal  [] Abnormal -         Discharge [x]  None visible  [] Abnormal -    HENT:   [x] Normocephalic, atraumatic.   [] Abnormal   [] Mouth/Throat: Mucous membranes are moist.     External Ears [x] Normal  [] Abnormal-     Neck: [x] No visualized mass     Pulmonary/Chest: [x] Respiratory effort normal.  [x] No visualized signs of difficulty breathing or respiratory distress        [] Abnormal-      Musculoskeletal:   [x] Normal gait with no signs of ataxia         [] Normal range of motion of neck        [] Abnormal-       Neurological:        [x] No Facial Asymmetry (Cranial nerve 7 motor function) (limited exam to video visit)          [] No gaze palsy        [] Abnormal-         Skin:        [x] No significant exanthematous lesions or discoloration noted on facial skin         [] Abnormal-            Psychiatric:       [x] Normal Affect [] No Hallucinations        [] Abnormal-     Other pertinent observable physical exam findings-     Lab Review   Orders Only on 03/22/2021   Component Date Value    Vit D, 25-Hydroxy 03/22/2021 47.2     Sodium 03/22/2021 142     Potassium 03/22/2021 4.3     Chloride 03/22/2021 101     CO2 03/22/2021 30     Anion Gap 03/22/2021 11     Glucose 03/22/2021 98     BUN 03/22/2021 19     CREATININE 03/22/2021 1.3*    GFR Non- 03/22/2021 40*    GFR  03/22/2021 49*    Calcium 03/22/2021 9.1     Total Protein 03/22/2021 7.4     Albumin 03/22/2021 4.0     Albumin/Globulin Ratio 03/22/2021 1.2     Total Bilirubin 03/22/2021 <0.2     Alkaline Phosphatase 03/22/2021 102     ALT 03/22/2021 22     AST 03/22/2021 24     Globulin 03/22/2021 3.4     C-Peptide 03/22/2021 0.7*    Cholesterol, Total 03/22/2021 112     Triglycerides 03/22/2021 117     HDL 03/22/2021 43     LDL Calculated 03/22/2021 46     VLDL Cholesterol Calcula* 03/22/2021 23     Hemoglobin A1C 03/22/2021 8.3     eAG 03/22/2021 191.5     TSH 03/22/2021 3.48     T4 Free 03/22/2021 1.2     Microalbumin, Random Uri* 03/22/2021 12.50*    Creatinine, Ur 03/22/2021 153.8     Microalbumin Creatinine * 03/22/2021 81.3*         ASSESSMENT/PLAN:  1. Dizziness  -Decrease Losartan-HCTZ 100-25mg to 1/2 tablet once daily  -stay hydrated    2.  Essential hypertension  -Decrease Losartan-HCTZ 100-25mg to 1/2 tablet once daily  -Continue Verapamil ER 100mg once daily  - Blood Pressure Monitoring (BLOOD PRESSURE MONITOR/L CUFF) MISC; Use to monitor blood pressure  Dispense: 1 each; Refill: 0      Return in about 1 week (around 3/30/2021) for dizziness and hypertension. Geronimo Guerra, was evaluated through a synchronous (real-time) audio-video encounter. The patient (or guardian if applicable) is aware that this is a billable service. Verbal consent to proceed has been obtained within the past 12 months. The visit was conducted pursuant to the emergency declaration under the [address] [place] [address] and the [employer]. Patient identification was verified, and a caregiver was present when appropriate. The patient was located in a state where the provider was credentialed to provide care. Total time spent on this encounter: Not billed by time    --Amos Gagnon MD on 3/30/2021 at 9:18 PM    An electronic signature was used to authenticate this note.

## 2021-03-24 LAB — C-PEPTIDE: 0.7 NG/ML (ref 1.1–4.4)

## 2021-03-30 ASSESSMENT — ENCOUNTER SYMPTOMS
VOMITING: 0
SHORTNESS OF BREATH: 0
COUGH: 0
RHINORRHEA: 0
CHEST TIGHTNESS: 0
SORE THROAT: 0
DIARRHEA: 0
CONSTIPATION: 0
ABDOMINAL PAIN: 0
NAUSEA: 0

## 2021-03-31 NOTE — PATIENT INSTRUCTIONS
1. Dizziness  -Decrease Losartan-HCTZ 100-25mg to 1/2 tablet once daily  -stay hydrated    2. Essential hypertension  -Decrease Losartan-HCTZ 100-25mg to 1/2 tablet once daily  -Continue Verapamil ER 100mg once daily  - Blood Pressure Monitoring (BLOOD PRESSURE MONITOR/L CUFF) MISC; Use to monitor blood pressure  Dispense: 1 each;  Refill: 0

## 2021-04-01 ENCOUNTER — TELEPHONE (OUTPATIENT)
Dept: PRIMARY CARE CLINIC | Age: 71
End: 2021-04-01

## 2021-04-01 ENCOUNTER — OFFICE VISIT (OUTPATIENT)
Dept: PRIMARY CARE CLINIC | Age: 71
End: 2021-04-01
Payer: MEDICARE

## 2021-04-01 VITALS
WEIGHT: 222.6 LBS | RESPIRATION RATE: 16 BRPM | BODY MASS INDEX: 35.93 KG/M2 | HEART RATE: 88 BPM | DIASTOLIC BLOOD PRESSURE: 64 MMHG | OXYGEN SATURATION: 99 % | SYSTOLIC BLOOD PRESSURE: 134 MMHG | TEMPERATURE: 97.2 F

## 2021-04-01 DIAGNOSIS — I10 ESSENTIAL HYPERTENSION: Primary | ICD-10-CM

## 2021-04-01 DIAGNOSIS — N18.31 STAGE 3A CHRONIC KIDNEY DISEASE (HCC): ICD-10-CM

## 2021-04-01 DIAGNOSIS — R80.9 MICROALBUMINURIA: ICD-10-CM

## 2021-04-01 DIAGNOSIS — R42 DIZZINESS: ICD-10-CM

## 2021-04-01 PROCEDURE — G8427 DOCREV CUR MEDS BY ELIG CLIN: HCPCS | Performed by: INTERNAL MEDICINE

## 2021-04-01 PROCEDURE — 3017F COLORECTAL CA SCREEN DOC REV: CPT | Performed by: INTERNAL MEDICINE

## 2021-04-01 PROCEDURE — G8417 CALC BMI ABV UP PARAM F/U: HCPCS | Performed by: INTERNAL MEDICINE

## 2021-04-01 PROCEDURE — 4040F PNEUMOC VAC/ADMIN/RCVD: CPT | Performed by: INTERNAL MEDICINE

## 2021-04-01 PROCEDURE — 1036F TOBACCO NON-USER: CPT | Performed by: INTERNAL MEDICINE

## 2021-04-01 PROCEDURE — G8399 PT W/DXA RESULTS DOCUMENT: HCPCS | Performed by: INTERNAL MEDICINE

## 2021-04-01 PROCEDURE — 99214 OFFICE O/P EST MOD 30 MIN: CPT | Performed by: INTERNAL MEDICINE

## 2021-04-01 PROCEDURE — 1123F ACP DISCUSS/DSCN MKR DOCD: CPT | Performed by: INTERNAL MEDICINE

## 2021-04-01 PROCEDURE — 1090F PRES/ABSN URINE INCON ASSESS: CPT | Performed by: INTERNAL MEDICINE

## 2021-04-01 NOTE — PATIENT INSTRUCTIONS
1. Essential hypertension  - blood pressure is normal in the office  -Continue same medications  -Low sodium diet  -Regular aerobic exercise    2. Dizziness  -better  -stay hydrated    3. chronic kidney disease  -Avoid NSAID's such as otc Ibuprofen, Advil, Motrin, Naprosyn, and Aleve as well as prescription NSAID's  - Referral to  Rosalva Mckinnon MD, Nephrology, [facility]    4.  Microalbuminuria  -Referral to  [facility] - Treva Hughes MD, Nephrology, [facility]

## 2021-04-01 NOTE — PROGRESS NOTES
[place]   Date ofBirth:  1950    Date of Visit:  4/1/2021    Chief Complaint   Patient presents with    Hypertension    Dizziness    Chronic Kidney Disease       HPI  Hypertension- Patient takes Losartan-HCTZ 100-25mg 1/2 tablet po q day and Verapamil ER 100mg po q day. Patient states her blood pressure on Monday was high 194/91 and last night 134/70's. Patient states she does not add salt to her food. Patient is not exercising. Dizziness- Patient states her dizziness is better with the decrease in losartan-HCTZ. Patient has increased her hydration. Chronic kidney disease-Patient states she would like a referral to another Nephrologist. Patient has had recent labs. Patient decreases NSAID's. Patient's kidney function is slightly worse. Review of Systems   Constitutional: Negative for chills, fatigue and fever. HENT: Negative for congestion, postnasal drip, rhinorrhea, sinus pressure and sore throat. Eyes: Negative for visual disturbance. Respiratory: Negative for cough, chest tightness, shortness of breath and wheezing. Cardiovascular: Negative for chest pain, palpitations and leg swelling. Gastrointestinal: Negative for abdominal pain, blood in stool, constipation, diarrhea, nausea and vomiting. Genitourinary: Negative for dysuria, frequency and hematuria. Musculoskeletal: Negative for arthralgias and myalgias. Skin: Negative for rash. Neurological: Negative for dizziness, tremors, syncope, weakness, light-headedness, numbness and headaches. Psychiatric/Behavioral: Negative for dysphoric mood and sleep disturbance. The patient is not nervous/anxious.         Allergies   Allergen Reactions    Januvia [Sitagliptin Phosphate] Swelling     Side effects    Actos [Pioglitazone] Diarrhea    Avandia [Rosiglitazone] Diarrhea and Nausea Only    Dye [Iodides] Rash    Jardiance [Empagliflozin] Diarrhea    Metformin Diarrhea     Nausea       Outpatient Medications Marked as Taking for the 4/1/21 encounter (Office Visit) with Ana Griffin MD   Medication Sig Dispense Refill    Blood Pressure Monitoring (BLOOD PRESSURE MONITOR/L CUFF) MISC Use to monitor blood pressure 1 each 0    ciclopirox (LOPROX) 0.77 % cream APPLY TO GREAT TOES DAILY      urea (CARMOL) 20 % cream Apply topically as needed      GABAPENTIN PO Take by mouth nightly      losartan-hydroCHLOROthiazide (HYZAAR) 100-25 MG per tablet TAKE 1 TABLET BY MOUTH DAILY 90 tablet 1    rosuvastatin (CRESTOR) 20 MG tablet TAKE 1 TABLET BY MOUTH DAILY 90 tablet 1    promethazine (PHENERGAN) 25 MG tablet Take 1 tablet by mouth every 6 hours as needed for Nausea 12 tablet 0    TRULICITY 1.5 NM/7.3AC SOPN ADMINISTER 0.5 ML(1.5MG) UNDER THE SKIN 1 TIME A WEEK 2 mL 5    verapamil (VERELAN PM) 100 MG CP24 extended release capsule TAKE 1 CAPSULE BY MOUTH DAILY 90 capsule 0    famotidine (PEPCID) 20 MG tablet TAKE 1 TABLET BY MOUTH DAILY 90 tablet 1    insulin lispro, 1 Unit Dial, (HUMALOG KWIKPEN) 100 UNIT/ML SOPN Inject 7 units TID with meals +SSI 30 mL 3    insulin glargine (LANTUS) 100 UNIT/ML injection vial Inject 28 Units into the skin 2 times daily 3 vial 2    vitamin D (ERGOCALCIFEROL) 1.25 MG (59717 UT) CAPS capsule TAKE 1 CAPSULE BY MOUTH 1 TIME A WEEK 12 capsule 1    blood glucose test strips (ACCU-CHEK ANJU PLUS) strip TEST BLOOD SUGAR THREE TIMES DAILY 300 strip 2    Insulin Syringe-Needle U-100 (INSULIN SYRINGE 1CC/31GX5/16\") 31G X 5/16\" 1 ML MISC Use five times daily.  200 each 2    UNKNOWN TO PATIENT Yellow and orange capped eye drops for BP in eye      Insulin Pen Needle (B-D UF III MINI PEN NEEDLES) 31G X 5 MM MISC 1 each by Does not apply route 2 times daily 100 each 6    ACCU-CHEK SOFTCLIX LANCETS MISC USE FOUR TIMES DAILY 300 each 6    Blood Glucose Monitoring Suppl (ACCU-CHEK ANJU PLUS) W/DEVICE KIT Use to monitor blood sugars 1 kit 0         Vitals:    04/01/21 1140   BP: 134/64   Pulse: 88   Resp: 16   Temp: 97.2 °F (36.2 °C)   SpO2: 99%   Weight: 222 lb 9.6 oz (101 kg)     Body mass index is 35.93 kg/m². Physical Exam  Nursing note reviewed. Constitutional:       General: She is not in acute distress. Appearance: Normal appearance. She is well-developed. Eyes:      General: Lids are normal.      Extraocular Movements: Extraocular movements intact. Conjunctiva/sclera: Conjunctivae normal.      Pupils: Pupils are equal, round, and reactive to light. Neck:      Musculoskeletal: Neck supple. Thyroid: No thyromegaly. Vascular: No carotid bruit. Cardiovascular:      Rate and Rhythm: Normal rate and regular rhythm. Heart sounds: Normal heart sounds, S1 normal and S2 normal. No murmur. No friction rub. No gallop. Pulmonary:      Effort: Pulmonary effort is normal. No respiratory distress. Breath sounds: Normal breath sounds. No wheezing, rhonchi or rales. Abdominal:      General: Bowel sounds are normal. There is no distension. Palpations: Abdomen is soft. Tenderness: There is no abdominal tenderness. Musculoskeletal:      Right lower leg: No edema. Left lower leg: No edema. Lymphadenopathy:      Head:      Right side of head: No submandibular adenopathy. Left side of head: No submandibular adenopathy. Neurological:      Mental Status: She is alert and oriented to person, place, and time. Psychiatric:         Mood and Affect: Mood normal.           No results found for this visit on 04/01/21.   Lab Review   Orders Only on 03/22/2021   Component Date Value    Vit D, 25-Hydroxy 03/22/2021 47.2     Sodium 03/22/2021 142     Potassium 03/22/2021 4.3     Chloride 03/22/2021 101     CO2 03/22/2021 30     Anion Gap 03/22/2021 11     Glucose 03/22/2021 98     BUN 03/22/2021 19     CREATININE 03/22/2021 1.3*    GFR Non- 03/22/2021 40*    GFR  03/22/2021 49*    Calcium 03/22/2021 9.1  Total Protein 03/22/2021 7.4     Albumin 03/22/2021 4.0     Albumin/Globulin Ratio 03/22/2021 1.2     Total Bilirubin 03/22/2021 <0.2     Alkaline Phosphatase 03/22/2021 102     ALT 03/22/2021 22     AST 03/22/2021 24     Globulin 03/22/2021 3.4     C-Peptide 03/22/2021 0.7*    Cholesterol, Total 03/22/2021 112     Triglycerides 03/22/2021 117     HDL 03/22/2021 43     LDL Calculated 03/22/2021 46     VLDL Cholesterol Calcula* 03/22/2021 23     Hemoglobin A1C 03/22/2021 8.3     eAG 03/22/2021 191.5     TSH 03/22/2021 3.48     T4 Free 03/22/2021 1.2     Microalbumin, Random Uri* 03/22/2021 12.50*    Creatinine, Ur 03/22/2021 153.8     Microalbumin Creatinine * 03/22/2021 81.3*   Orders Only on 12/15/2020   Component Date Value    Organism 12/15/2020 Escherichia coli*    Urine Culture, Routine 12/15/2020 >100,000 CFU/ml     Color, UA 12/15/2020 YELLOW     Clarity, UA 12/15/2020 CLOUDY*    Glucose, Ur 12/15/2020 Negative     Bilirubin Urine 12/15/2020 Negative     Ketones, Urine 12/15/2020 Negative     Specific Gravity, UA 12/15/2020 1.018     Blood, Urine 12/15/2020 Negative     pH, UA 12/15/2020 6.5     Protein, UA 12/15/2020 30*    Urobilinogen, Urine 12/15/2020 0.2     Nitrite, Urine 12/15/2020 POSITIVE*    Leukocyte Esterase, Urine 12/15/2020 SMALL*    Microscopic Examination 12/15/2020 YES     Urine Type 12/15/2020 Cleancatch     Bacteria, UA 12/15/2020 4+*    Hyaline Casts, UA 12/15/2020 0     WBC, UA 12/15/2020 32*    RBC, UA 12/15/2020 7*    Epithelial Cells, UA 12/15/2020 8*   Orders Only on 12/10/2020   Component Date Value    Sodium 12/10/2020 139     Potassium 12/10/2020 3.9     Chloride 12/10/2020 99     CO2 12/10/2020 29     Anion Gap 12/10/2020 11     Glucose 12/10/2020 231*    BUN 12/10/2020 21*    CREATININE 12/10/2020 1.0     GFR Non- 12/10/2020 55*    GFR  12/10/2020 >60     Calcium 12/10/2020 9.1     Protein, Ur 12/10/2020 44.00*    Creatinine, Ur 12/10/2020 115.4     Protein/Creat Ratio 12/10/2020 0.4     PTH 12/10/2020 71.7     WBC 12/10/2020 10.7     RBC 12/10/2020 3.57*    Hemoglobin 12/10/2020 11.0*    Hematocrit 12/10/2020 32.4*    MCV 12/10/2020 90.8     MCH 12/10/2020 30.9     MCHC 12/10/2020 34.0     RDW 12/10/2020 13.1     Platelets 47/41/8595 393     MPV 12/10/2020 6.9     Color, UA 12/10/2020 YELLOW     Clarity, UA 12/10/2020 Clear     Glucose, Ur 12/10/2020 Negative     Bilirubin Urine 12/10/2020 Negative     Ketones, Urine 12/10/2020 Negative     Specific Gravity, UA 12/10/2020 1.016     Blood, Urine 12/10/2020 Negative     pH, UA 12/10/2020 6.5     Protein, UA 12/10/2020 30*    Urobilinogen, Urine 12/10/2020 0.2     Nitrite, Urine 12/10/2020 POSITIVE*    Leukocyte Esterase, Urine 12/10/2020 SMALL*    Microscopic Examination 12/10/2020 YES     Urine Type 12/10/2020 Cleancatch     Bacteria, UA 12/10/2020 4+*    Hyaline Casts, UA 12/10/2020 0     WBC, UA 12/10/2020 41*    RBC, UA 12/10/2020 1     Epithelial Cells, UA 12/10/2020 1     Vit D, 25-Hydroxy 12/10/2020 47.8     Total Protein 12/10/2020 7.0     Albumin 12/10/2020 3.1     Alpha-1-Globulin 12/10/2020 0.3     Alpha-2-Globulin 12/10/2020 1.1     Beta Globulin 12/10/2020 1.4     Gamma Globulin 12/10/2020 1.2     SPE/ARCADIO Interpretation 12/10/2020 REVIEWED    Orders Only on 10/27/2020   Component Date Value    Vit D, 25-Hydroxy 10/27/2020 51.6     Sodium 10/27/2020 138     Potassium 10/27/2020 4.2     Chloride 10/27/2020 97*    CO2 10/27/2020 32     Anion Gap 10/27/2020 9     Glucose 10/27/2020 154*    BUN 10/27/2020 23*    CREATININE 10/27/2020 1.1     GFR Non- 10/27/2020 49*    GFR  10/27/2020 59*    Calcium 10/27/2020 9.2     Total Protein 10/27/2020 6.8     Albumin 10/27/2020 3.5     Albumin/Globulin Ratio 10/27/2020 1.1     Total Bilirubin 10/27/2020 0.3     Alkaline Phosphatase 10/27/2020 92     ALT 10/27/2020 17     AST 10/27/2020 19     Globulin 10/27/2020 3.3     Cholesterol, Total 10/27/2020 112     Triglycerides 10/27/2020 128     HDL 10/27/2020 36*    LDL Calculated 10/27/2020 50     VLDL Cholesterol Calcula* 10/27/2020 26     Hemoglobin A1C 10/27/2020 7.8     eAG 10/27/2020 177.2          Assessment/Plan     1. Essential hypertension  - blood pressure is normal in the office  -Continue same medications  -Low sodium diet  -Regular aerobic exercise  -monitor blood pressure at home    2. Dizziness  -better  -stay hydrated    3. chronic kidney disease  -Avoid NSAID's such as otc Ibuprofen, Advil, Motrin, Naprosyn, and Aleve as well as prescription NSAID's  - Referral to  Dotty Christopher MD, Nephrology, [facility]    4. Microalbuminuria  -Referral to  Therese Collins MD, Nephrology, [facility]          Discussed medications with patient, who voiced understanding of their use and indications. All questions answered. Return in about 4 weeks (around 4/29/2021) for hypertension, laryngopharyngeal reflux, hyperlipidemia, and vitamin D deficiency.

## 2021-04-02 ENCOUNTER — TELEPHONE (OUTPATIENT)
Dept: ADMINISTRATIVE | Age: 71
End: 2021-04-02

## 2021-04-02 NOTE — TELEPHONE ENCOUNTER
[facility] received a call from: Kelly Kat    Name of Caller: Kelly Kat    Relationship to patient: Self    Organization name:     Best contact number: [phone]    Reason for call: Patient would like to have Dr. Areli Graham call her back when she's available. Patient was referred to a Kidney Hypertension specialist [address] [place] [address]. The specialist's office would like for Dr. Freddie Lomax to send a referral over to the patient in order to schedule an appointment with the specialist. Specialist would also like for a copy of the patient's medical records.

## 2021-04-06 ENCOUNTER — OFFICE VISIT (OUTPATIENT)
Dept: ENDOCRINOLOGY | Age: 71
End: 2021-04-06
Payer: MEDICARE

## 2021-04-06 DIAGNOSIS — E11.65 UNCONTROLLED TYPE 2 DIABETES MELLITUS WITH HYPERGLYCEMIA (HCC): ICD-10-CM

## 2021-04-06 PROCEDURE — 97803 MED NUTRITION INDIV SUBSEQ: CPT | Performed by: DIETITIAN, REGISTERED

## 2021-04-06 NOTE — PROGRESS NOTES
Medical Nutrition Therapy for Diabetes    Pierce Salinas  April 6, 2021      Patient Care Team:  Zunilda Washington MD as PCP - General (Internal Medicine)  Zunilda Washington MD as PCP - [facility] Empaneled Provider  Debbie Galindo MD as Consulting Physician (Ophthalmology)  Keaton Florez MD as Consulting Physician (Gastroenterology)  Nerissa Lima DPM as Consulting Physician (Podiatry)  Ronnie Ocampo RD, LD as Diabetic Educator (Dietitian)  Nomi Argueta MD as Consulting Physician (Otolaryngology)    Reason for visit: uncontrolled, type 2 Diabetes    ASSESSMENT/PLAN:   NUTRITION DIAGNOSIS    #1 Problem: Altered Nutrition-Related Laboratory Values (NC-2.2)  Related to: Endocrine/Diabetes   As Evidenced by: Elevated Plasma glucose and/or HgbA1c levels         #2 Problem:  Inconsistent Carbohydrate and Fiber Intake  Related to: Food- and nutrition-related knowledge deficit concerning appropriate amount of carbohydrate and fiber intake  As evidenced by: patient food recall      #3 Problem: Excessive Carbohydrate Intake (NI-5.8. 2)  Related to: Food-and nutrition-related knowledge deficit concerning appropriate amount of carbohydrate intake  As evidenced by: Estimated carbohydrate intake that is consistently more than the recommended amounts    NUTRITION INTERVENTION  Nutrition Prescription:  3 meals  30 grams carbohydrate per meal with protein and non-starch vegetables  15 gram carbohydrate snacks    Patient reveals she does not want to be on dialysis.     Diabetes Education/Counseling included:  Carbohydrate Control, Monitoring, Hypoyglycemia prevention/treatment and Insulin management    Interventions:  Control Carbohydrate Intake using Plate Guide, Increase intake of vegetables, Increase intake of whole grains, Decrease intake of foods high in saturated fat and Decrease intake of high-sodium foods  Handouts: Healthy Eating Guidelines for Diabetes-[facility], [facility] OF [facility] IN[facility] Health, Planning Healthy tablet 1    insulin lispro, 1 Unit Dial, (HUMALOG KWIKPEN) 100 UNIT/ML SOPN Inject 7 units TID with meals +SSI 30 mL 3    insulin glargine (LANTUS) 100 UNIT/ML injection vial Inject 28 Units into the skin 2 times daily 3 vial 2    vitamin D (ERGOCALCIFEROL) 1.25 MG (10446 UT) CAPS capsule TAKE 1 CAPSULE BY MOUTH 1 TIME A WEEK 12 capsule 1    blood glucose test strips (ACCU-CHEK ANJU PLUS) strip TEST BLOOD SUGAR THREE TIMES DAILY 300 strip 2    Insulin Syringe-Needle U-100 (INSULIN SYRINGE 1CC/31GX5/16\") 31G X 5/16\" 1 ML MISC Use five times daily. 200 each 2    UNKNOWN TO PATIENT Yellow and orange capped eye drops for BP in eye      Insulin Pen Needle (B-D UF III MINI PEN NEEDLES) 31G X 5 MM MISC 1 each by Does not apply route 2 times daily 100 each 6    ACCU-CHEK SOFTCLIX LANCETS MISC USE FOUR TIMES DAILY 300 each 6    Blood Glucose Monitoring Suppl (ACCU-CHEK ANJU PLUS) W/DEVICE KIT Use to monitor blood sugars 1 kit 0     No current facility-administered medications for this visit.           NUTRITION ASSESSMENT    Biochemical Data:    Lab Results   Component Value Date    LABA1C 8.3 03/22/2021     Lab Results   Component Value Date    .5 03/22/2021       Lab Results   Component Value Date    CHOL 112 03/22/2021    CHOL 112 10/27/2020    CHOL 117 04/23/2020     Lab Results   Component Value Date    TRIG 117 03/22/2021    TRIG 128 10/27/2020    TRIG 133 04/23/2020     Lab Results   Component Value Date    HDL 43 03/22/2021    HDL 36 (L) 10/27/2020    HDL 33 (L) 04/23/2020     Lab Results   Component Value Date    LDLCALC 46 03/22/2021    LDLCALC 50 10/27/2020    LDLCALC 57 04/23/2020     Lab Results   Component Value Date    LABVLDL 23 03/22/2021    LABVLDL 26 10/27/2020    LABVLDL 27 04/23/2020     No results found for: [facility]    Lab Results   Component Value Date    WBC 10.7 12/10/2020    HGB 11.0 (L) 12/10/2020    HCT 32.4 (L) 12/10/2020    MCV 90.8 12/10/2020     12/10/2020 Lab Results   Component Value Date    CREATININE 1.3 (H) 2021    BUN 19 2021     2021    K 4.3 2021     2021    CO2 30 2021       Diabetes Medications: Yes  Knows name and dose of prescribed medications Yes  Knows prescribed schedule for medicationsYes  Recent change in medication type/dosage: Yes  Stores  medications properlyYes  Comments: Patient uses  insulin due to irregulars dosing. Monitoring:   Has BG meter: Yes  Testing frequency: iregular  Recent results: high  Hypoglycemia? No    Anthropometric Measurements: Wt:   Wt Readings from Last 3 Encounters:   21 222 lb 9.6 oz (101 kg)   21 222 lb 9.6 oz (101 kg)   21 223 lb (101.2 kg)      BMI:   BMI Readings from Last 3 Encounters:   21 35.93 kg/m²   21 35.93 kg/m²   21 35.99 kg/m²     Patient's stated goal weight:   7% Weight loss goal weight:     Physical Activity History:   Physical activity: walking  Frequency of activity: routine  Duration of activity: irrecular  Obstacles to activity: none    Sleep: up late watching TV and on tech devices    Food and Nutrition History:   Nutrition Awareness/Previous DSMES: yes  Number of people in household: 1  Frequency of Meals Eaten away from home:1-2daily  Food Availability Problems  Within the past 12 months, have you worried that your food would run out before you got money to buy more? No  Within the past 12 months, has the food you bought not lasted till the end of the month and you didn't have money to get more? No  Beverage consumption: water-48-64 ozs.   Alcohol consumption:   Usual Food consumption:    3 meals, 45-60 grams carbohydrate,      Barriers:   -low motivation          Follow Up Plan: 6 weeks    Referring Provider: Shala Barton MD  Time spent with patient: 60 minutes

## 2021-04-08 ASSESSMENT — ENCOUNTER SYMPTOMS
COUGH: 0
SINUS PRESSURE: 0
WHEEZING: 0
SHORTNESS OF BREATH: 0
CONSTIPATION: 0
RHINORRHEA: 0
BLOOD IN STOOL: 0
ABDOMINAL PAIN: 0
DIARRHEA: 0
SORE THROAT: 0
NAUSEA: 0
CHEST TIGHTNESS: 0
VOMITING: 0

## 2021-04-22 DIAGNOSIS — I10 ESSENTIAL HYPERTENSION: ICD-10-CM

## 2021-04-22 RX ORDER — VERAPAMIL HYDROCHLORIDE 100 MG/1
1 CAPSULE, EXTENDED RELEASE ORAL DAILY
Qty: 90 CAPSULE | Refills: 1 | Status: SHIPPED | OUTPATIENT
Start: 2021-04-22 | End: 2022-03-03

## 2021-04-22 NOTE — TELEPHONE ENCOUNTER
Medication:   Requested Prescriptions     Pending Prescriptions Disp Refills    verapamil (VERELAN PM) 100 MG CP24 extended release capsule [Pharmacy Med Name: VERAPAMIL ER 100MG CAPSULES (24 HR)] 90 capsule 0     Sig: TAKE 1 CAPSULE BY MOUTH DAILY     Last Filled: 1.14.21    Last appt: 4/1/2021   Next appt: Visit date not found    Last OARRS: No flowsheet data found.

## 2021-04-27 ENCOUNTER — TELEPHONE (OUTPATIENT)
Dept: PRIMARY CARE CLINIC | Age: 71
End: 2021-04-27

## 2021-05-17 NOTE — TELEPHONE ENCOUNTER
Requested Prescriptions     Pending Prescriptions Disp Refills    blood glucose test strips (ACCU-CHEK ANJU PLUS) strip 300 strip 2     Sig: TEST BLOOD SUGAR THREE TIMES DAILY       Last ov: 3/17/2021  Next ov: 6/23/2021

## 2021-05-18 RX ORDER — BLOOD SUGAR DIAGNOSTIC
STRIP MISCELLANEOUS
Qty: 300 STRIP | Refills: 2 | Status: SHIPPED | OUTPATIENT
Start: 2021-05-18 | End: 2022-02-14

## 2021-07-05 DIAGNOSIS — E55.9 VITAMIN D DEFICIENCY: ICD-10-CM

## 2021-07-07 RX ORDER — ERGOCALCIFEROL 1.25 MG/1
CAPSULE ORAL
Qty: 12 CAPSULE | Refills: 1 | Status: SHIPPED | OUTPATIENT
Start: 2021-07-07 | End: 2021-12-29 | Stop reason: SDUPTHER

## 2021-07-15 ENCOUNTER — OFFICE VISIT (OUTPATIENT)
Dept: ENDOCRINOLOGY | Age: 71
End: 2021-07-15
Payer: MEDICARE

## 2021-07-15 VITALS
SYSTOLIC BLOOD PRESSURE: 134 MMHG | TEMPERATURE: 98 F | HEIGHT: 66 IN | WEIGHT: 227 LBS | RESPIRATION RATE: 14 BRPM | BODY MASS INDEX: 36.48 KG/M2 | OXYGEN SATURATION: 94 % | HEART RATE: 78 BPM | DIASTOLIC BLOOD PRESSURE: 60 MMHG

## 2021-07-15 DIAGNOSIS — E66.01 CLASS 2 SEVERE OBESITY WITH SERIOUS COMORBIDITY AND BODY MASS INDEX (BMI) OF 36.0 TO 36.9 IN ADULT, UNSPECIFIED OBESITY TYPE (HCC): ICD-10-CM

## 2021-07-15 DIAGNOSIS — L30.9 DERMATITIS OF EXTERNAL EAR: ICD-10-CM

## 2021-07-15 DIAGNOSIS — E55.9 VITAMIN D DEFICIENCY: ICD-10-CM

## 2021-07-15 DIAGNOSIS — I10 ESSENTIAL HYPERTENSION: ICD-10-CM

## 2021-07-15 DIAGNOSIS — E78.2 MIXED HYPERLIPIDEMIA: ICD-10-CM

## 2021-07-15 LAB — HBA1C MFR BLD: 9.2 %

## 2021-07-15 PROCEDURE — G8399 PT W/DXA RESULTS DOCUMENT: HCPCS | Performed by: INTERNAL MEDICINE

## 2021-07-15 PROCEDURE — 99215 OFFICE O/P EST HI 40 MIN: CPT | Performed by: INTERNAL MEDICINE

## 2021-07-15 PROCEDURE — G8417 CALC BMI ABV UP PARAM F/U: HCPCS | Performed by: INTERNAL MEDICINE

## 2021-07-15 PROCEDURE — 4040F PNEUMOC VAC/ADMIN/RCVD: CPT | Performed by: INTERNAL MEDICINE

## 2021-07-15 PROCEDURE — 1090F PRES/ABSN URINE INCON ASSESS: CPT | Performed by: INTERNAL MEDICINE

## 2021-07-15 PROCEDURE — 2022F DILAT RTA XM EVC RTNOPTHY: CPT | Performed by: INTERNAL MEDICINE

## 2021-07-15 PROCEDURE — 1036F TOBACCO NON-USER: CPT | Performed by: INTERNAL MEDICINE

## 2021-07-15 PROCEDURE — 83036 HEMOGLOBIN GLYCOSYLATED A1C: CPT | Performed by: INTERNAL MEDICINE

## 2021-07-15 PROCEDURE — 3017F COLORECTAL CA SCREEN DOC REV: CPT | Performed by: INTERNAL MEDICINE

## 2021-07-15 PROCEDURE — 1123F ACP DISCUSS/DSCN MKR DOCD: CPT | Performed by: INTERNAL MEDICINE

## 2021-07-15 PROCEDURE — G8427 DOCREV CUR MEDS BY ELIG CLIN: HCPCS | Performed by: INTERNAL MEDICINE

## 2021-07-15 PROCEDURE — 3046F HEMOGLOBIN A1C LEVEL >9.0%: CPT | Performed by: INTERNAL MEDICINE

## 2021-07-15 RX ORDER — INSULIN GLARGINE 100 [IU]/ML
28 INJECTION, SOLUTION SUBCUTANEOUS 2 TIMES DAILY
Qty: 3 VIAL | Refills: 3 | Status: SHIPPED | OUTPATIENT
Start: 2021-07-15 | End: 2022-01-31 | Stop reason: SDUPTHER

## 2021-07-15 RX ORDER — DULAGLUTIDE 1.5 MG/.5ML
INJECTION, SOLUTION SUBCUTANEOUS
Qty: 4 PEN | Refills: 3 | Status: SHIPPED | OUTPATIENT
Start: 2021-07-15 | End: 2021-12-20

## 2021-07-15 NOTE — PROGRESS NOTES
Jose Armando Arce is a 70 y.o. female who is being evaluated for Type 2 diabetes mellitus. H.    Current symptoms/problems include hperglycemia and are worsening. Type 2 diabetes mellitus, uncontrolled, with neuropathy (HCC) [E11.40, E11.65]    Diagnosed with Type 2 diabetes mellitus in . Comorbid conditions: Hypertension, hyperlipidemia, obesity, Neuropathy, Nephropathy and Retinopathy    Current diabetic medications include: Lantus 28 units twice daily, Humalog 7 units 3 times daily with meals, Trulicity 1.5 mg weekly  Correction scale of Humalo-200-2 units, 201-250-4 units, 251-300-6 units, >301 -units 8 units   Intolerance to diabetes medications: Yes Actos-leg swelling, Metformin     Weight trend: stable  Prior visit with dietician: yes  Current diet: on average, 2 meals per day  Current exercise: no     Current monitoring regimen: home blood tests - 4 times daily  Has brought blood glucose log/meter:  No  Home blood sugar records: fasting range: 100-130 and postprandial range: 100-130  Any episodes of hypoglycemia? No  Hypoglycemia frequency and time(s):    Does patient have Glucagon emergency kit? No  Does patient have rapid acting carbohydrate? No  Does patient wear a medic alert bracelet or necklace? No      2. Uncontrolled type 2 diabetes mellitus with diabetic nephropathy (HCC)  No urination problems    3. Type 2 diabetes, uncontrolled, with retinopathy ([address]  No vision changes    4. Vitamin D deficiency  No fatigue    5. Mixed hyperlipidemia  No muscle pain    6. Essential hypertension  No headaches    7.   Obesity   Eats healthy      Past Medical History:   Diagnosis Date    Dizziness     Hearing loss     Hyperlipidemia     Hypertension     Laryngopharyngeal reflux disease 2019    Morbidly obese ([address] 10/29/2020    Tinnitus     Type II or unspecified type diabetes mellitus without mention of complication, not stated as uncontrolled     Uncontrolled type 2 diabetes mellitus with diabetic nephropathy ([facility] 3/17/2021    Urinary, incontinence, stress female 2020    Vitamin D deficiency 2019      Patient Active Problem List   Diagnosis    HTN (hypertension)    Hyperlipidemia    Diabetes mellitus ([address]    Non-compliance with treatment    Diabetes mellitus type 2, uncontrolled ([address]    Bilateral impacted cerumen    Vitamin D deficiency    Laryngopharyngeal reflux disease    Cataract of both eyes    Age-related cataract of right eye    Urinary, incontinence, stress female    Uncontrolled type 2 diabetes mellitus with complication, with long-term current use of insulin ([address]    Morbidly obese ([address]    Urinary tract infection without hematuria    Type 2 diabetes mellitus, uncontrolled, with neuropathy ([address]    Uncontrolled type 2 diabetes mellitus with diabetic nephropathy (HCC)    Type 2 diabetes, uncontrolled, with retinopathy ([facility]    Hypertension    Class 2 obesity with body mass index (BMI) of 35.0 to 35.9 in adult    Dizziness     Past Surgical History:   Procedure Laterality Date     SECTION      x 1     EYE SURGERY      right eye- cataract     TONSILLECTOMY       Social History     Socioeconomic History    Marital status: Single     Spouse name: Not on file    Number of children: Not on file    Years of education: Not on file    Highest education level: Not on file   Occupational History    Occupation:  at [address] [place] [address]    Smoking status: Never Smoker    Smokeless tobacco: Never Used   Vaping Use    Vaping Use: Never used   Substance and Sexual Activity    Alcohol use: No    Drug use: No    Sexual activity: Not Currently   Other Topics Concern    Not on file   Social History Narrative    Not on file     Social Determinants of Health     Financial Resource Strain:     Difficulty of Paying Living Expenses:    Food Insecurity:     Worried About Running Out of Food in the Last Year:     Monica of Food in the Last Year:    Transportation Needs:     Lack of Transportation (Medical):      Lack of Transportation (Non-Medical):    Physical Activity:     Days of Exercise per Week:     Minutes of Exercise per Session:    Stress:     Feeling of Stress :    Social Connections:     Frequency of Communication with Friends and Family:     Frequency of Social Gatherings with Friends and Family:     Attends Jehovah's witness Services:     Active Member of Clubs or Organizations:     Attends Club or Organization Meetings:     Marital Status:    Intimate Partner Violence:     Fear of Current or Ex-Partner:     Emotionally Abused:     Physically Abused:     Sexually Abused:      Family History   Problem Relation Age of Onset    Diabetes Sister     Heart Disease Sister 77    Diabetes Brother         had amputation    High Blood Pressure Brother     Cancer Father      Current Outpatient Medications   Medication Sig Dispense Refill    insulin glargine (LANTUS) 100 UNIT/ML injection vial Inject 28 Units into the skin 2 times daily 3 vial 3    Dulaglutide (TRULICITY) 1.5 ZS/2.8AR SOPN ADMINISTER 0.5 ML(1.5MG) UNDER THE SKIN 1 TIME A WEEK 4 pen 3    vitamin D (ERGOCALCIFEROL) 1.25 MG (26646 UT) CAPS capsule TAKE 1 CAPSULE BY MOUTH 1 TIME A WEEK 12 capsule 1    blood glucose test strips (ACCU-CHEK ANJU PLUS) strip TEST BLOOD SUGAR THREE TIMES DAILY 300 strip 2    Blood Pressure Monitoring (BLOOD PRESSURE MONITOR/L CUFF) MISC Use to monitor blood pressure 1 each 0    ciclopirox (LOPROX) 0.77 % cream APPLY TO GREAT TOES DAILY      urea (CARMOL) 20 % cream Apply topically as needed      GABAPENTIN PO Take by mouth nightly      losartan-hydroCHLOROthiazide (HYZAAR) 100-25 MG per tablet TAKE 1 TABLET BY MOUTH DAILY 90 tablet 1    rosuvastatin (CRESTOR) 20 MG tablet TAKE 1 TABLET BY MOUTH DAILY 90 tablet 1    promethazine (PHENERGAN) 25 MG tablet Take 1 tablet by mouth every 6 hours as needed for Nausea 12 tablet 0    famotidine (PEPCID) 20 MG tablet TAKE 1 TABLET BY MOUTH DAILY 90 tablet 1    insulin lispro, 1 Unit Dial, (HUMALOG KWIKPEN) 100 UNIT/ML SOPN Inject 7 units TID with meals +SSI 30 mL 3    Insulin Syringe-Needle U-100 (INSULIN SYRINGE 1CC/31GX5/16\") 31G X 5/16\" 1 ML MISC Use five times daily. 200 each 2    UNKNOWN TO PATIENT Yellow and orange capped eye drops for BP in eye      Insulin Pen Needle (B-D UF III MINI PEN NEEDLES) 31G X 5 MM MISC 1 each by Does not apply route 2 times daily 100 each 6    ACCU-CHEK SOFTCLIX LANCETS MISC USE FOUR TIMES DAILY 300 each 6    Blood Glucose Monitoring Suppl (ACCU-CHEK ANJU PLUS) W/DEVICE KIT Use to monitor blood sugars 1 kit 0    verapamil (VERELAN PM) 100 MG CP24 extended release capsule TAKE 1 CAPSULE BY MOUTH DAILY (Patient not taking: Reported on 7/15/2021) 90 capsule 1     No current facility-administered medications for this visit.      Allergies   Allergen Reactions    Januvia [Sitagliptin Phosphate] Swelling     Side effects    Actos [Pioglitazone] Diarrhea    Avandia [Rosiglitazone] Diarrhea and Nausea Only    Dye [Iodides] Rash    Jardiance [Empagliflozin] Diarrhea    Metformin Diarrhea     Nausea       Family Status   Relation Name Status    Sister  Alive    Brother  Alive    Mother   at age 28        unknown    Father   at age 39        lung       Lab Review:    Lab Results   Component Value Date    WBC 10.5 2021    HGB 10.5 2021    HCT 30.1 2021    MCV 90.1 2021     2021     Lab Results   Component Value Date     2021    K 4.1 2021     2021    CO2 28 2021    BUN 25 2021    CREATININE 1.3 2021    GLUCOSE 106 2021    CALCIUM 9.0 2021    PROT 7.4 2021    PROT 7.1 2012    LABALBU 4.0 2021    BILITOT <0.2 2021    ALKPHOS 102 2021    AST 24 2021    ALT 22 2021    LABGLOM 40 2021 GFRAA 49 07/14/2021    GFRAA >60 04/04/2013    AGRATIO 1.2 03/22/2021    GLOB 3.4 03/22/2021     Lab Results   Component Value Date    TSH 3.48 03/22/2021     Lab Results   Component Value Date    LABA1C 8.3 03/22/2021     Lab Results   Component Value Date    .5 03/22/2021     Lab Results   Component Value Date    CHOL 112 03/22/2021     Lab Results   Component Value Date    TRIG 117 03/22/2021     Lab Results   Component Value Date    HDL 43 03/22/2021    HDL 43 01/23/2012     Lab Results   Component Value Date    LDLCALC 46 03/22/2021     Lab Results   Component Value Date    LABVLDL 23 03/22/2021     No results found for: [facility]  Lab Results   Component Value Date    LABMICR 7.20 07/14/2021    LABMICR YES 12/15/2020     Lab Results   Component Value Date    VITD25 42.7 07/14/2021        Review of Systems:  Constitutional: no fatigue, no fever, no recent weight gain, no recent weight loss, no changes in appetite  Eyes: no eye pain, no change in vision, no eye redness, no eye irritation, no double vision  Ears, nose, throat: no nasal congestion, no sore throat, no earache, no decrease in hearing, no hoarseness, no dry mouth, no sinus problems, no difficulty swallowing, no neck lumps, no dental problems, no mouth sores, no ringing in ears  Pulmonary: no shortness of breath, no wheezing, no dyspnea on exertion, no cough  Cardiovascular: no chest pain, no lower extremity edema, no orthopnea, no intermittent leg claudication, no palpitations  Gastrointestinal: no abdominal pain, no nausea, no vomiting, no diarrhea, no constipation, no dysphagia, no heartburn, no bloating  Genitourinary: no dysuria, no urinary incontinence, no urinary hesitancy, no urinary frequency, no feelings of urinary urgency, no nocturia  Musculoskeletal: no joint swelling, no joint stiffness, no joint pain, no muscle cramps, no muscle pain, no bone pain  Integument/Breast: no hair loss, no skin rashes, no skin lesions, no itching, no dry skin  Neurological: no numbness, no tingling, no weakness, no confusion, no headaches, no dizziness, no fainting, no tremors, no decrease in memory, no balance problems  Psychiatric: no anxiety, no depression, no insomnia  Hematologic/Lymphatic: no tendency for easy bleeding, no swollen lymph nodes, no tendency for easy bruising  Immunology: no seasonal allergies, no frequent infections, no frequent illnesses  Endocrine: no temperature intolerance    /60   Pulse 78   Temp 98 °F (36.7 °C)   Resp 14   Ht 5' 6\" (1.676 m)   Wt 227 lb (103 kg)   SpO2 94%   BMI 36.64 kg/m²    Wt Readings from Last 3 Encounters:   07/15/21 227 lb (103 kg)   04/01/21 222 lb 9.6 oz (101 kg)   03/25/21 222 lb 9.6 oz (101 kg)     Body mass index is 36.64 kg/m².       OBJECTIVE:  Constitutional: no acute distress, well appearing and well nourished  Psychiatric: oriented to person, place and time, judgement and insight and normal, recent and remote memory and intact and mood and affect are normal  Skin: skin and subcutaneous tissue is normal without mass, normal turgor  Head and Face: examination of head and face revealed no abnormalities  Eyes: no lid or conjunctival swelling, erythema or discharge, pupils are normal, equal, round, reactive to light  Ears/Nose: external inspection of ears and nose revealed no abnormalities, hearing is grossly normal  Oropharynx/Mouth/Face: lips, tongue and gums are normal with no lesions, the voice quality was normal  Neck: neck is supple and symmetric, with midline trachea and no masses, thyroid is normal  Lymphatics: normal cervical lymph nodes, normal supraclavicular nodes  Pulmonary: no increased work of breathing or signs of respiratory distress, lungs are clear to auscultation  Cardiovascular: normal heart rate and rhythm, normal S1 and S2, no murmurs and pedal pulses and 2+ bilaterally, No edema  Abdomen: abdomen is soft, non-tender with no masses  Musculoskeletal: normal gait and Supervision was available station and exam of the digits and nails are normal  Neurological: normal coordination and normal general cortical function    ASSESSMENT/PLAN:  1. Type 2 diabetes mellitus, uncontrolled, with neuropathy ([facility]  Send blood glucose records for review  Current diabetic medications include: Lantus 28 units twice daily, Humalog 7 units 3 times daily with meals, Trulicity 1.5 mg weekly  Correction scale of Humalo-200-2 units, 201-250-4 units, 251-300-6 units, >301 -units 8 units   - GABAPENTIN PO; Take by mouth nightly  - Hemoglobin A1C; Future  - Lipid Panel; Future  - Microalbumin / Creatinine Urine Ratio; Future  - Comprehensive Metabolic Panel; Future  - [facility] Individual Diabetes Education (Non Care Coord Patient), [place]    2. Uncontrolled type 2 diabetes mellitus with diabetic nephropathy ([facility]  Continue monitoring  Microalbumin creatinine ratio 81.6  - Hemoglobin A1C; Future  - Lipid Panel; Future  - Microalbumin / Creatinine Urine Ratio; Future  - Comprehensive Metabolic Panel; Future    3. Type 2 diabetes, uncontrolled, with retinopathy ([facility]  Hemoglobin A1c 7.8  Follow with ophthalmology    4. Vitamin D deficiency  25OH vitamin D 47-42.7  Vitamin D 50,000 international units weekly  - Vitamin D 25 Hydroxy; Future    5. Mixed hyperlipidemia  Rosuvastatin 20 mg daily  - Lipid Panel; Future    6. Essential hypertension  Verapamil, losartan/hydrochlorthiazide    7.  Obesity  Diet, exercise     Reviewed and/or ordered clinical lab results Yes  Reviewed and/or ordered radiology tests Yes MRI brain  Reviewed and/or ordered other diagnostic tests No  Discussed test results with performing physician No  Independently reviewed image, tracing, or specimen No  Made a decision to obtain old records No  Reviewed and summarized old records Yes   25OH VITAMIN D 47  Hemoglobin A1c 7.8  Hypertension  Hyperlipidemia  Obtained history from other than patient No    Skye Cruz was counseled regarding symptoms of diabetes, hyperlipidemia, hypertension diagnosis, course and complications of disease if inadequately treated, side effects of medications, diagnosis, treatment options, and prognosis, risks, benefits, complications, and alternatives of treatment, labs, imaging and other studies and treatment targets and goals, insulin correction scale, prandial injections. She understands instructions and counseling. These diagnosis were discussed and reviewed with the patient including the advantages of drug therapy. She was counseled at this visit on the following: diabetes complication prevention and foot care. Total time I spent for this encounter 40 minutes    Return in about 3 months (around 10/15/2021) for diabetes.     Electronically signed by Amanda Rodriguez MD on 7/15/2021 at 4:42 PM Supervision was available

## 2021-07-15 NOTE — TELEPHONE ENCOUNTER
Medication:   Requested Prescriptions     Pending Prescriptions Disp Refills    hydrocortisone 2.5 % cream 30 g 0     Sig: APPLY EXTERNALLY TO THE AFFECTED AREA TWICE DAILY     Last Filled: 7.25.19    Last appt: 04/01/2021   Next appt: Visit date not found    Last OARRS: No flowsheet data found.

## 2021-07-15 NOTE — TELEPHONE ENCOUNTER
Patient is seeing Dr Sonia Frausto today and asked the a refill request be sent to Dr Adriana Grady for her hydrocortisone cream.

## 2021-08-03 ENCOUNTER — TELEPHONE (OUTPATIENT)
Dept: PRIMARY CARE CLINIC | Age: 71
End: 2021-08-03

## 2021-08-03 DIAGNOSIS — K21.9 LARYNGOPHARYNGEAL REFLUX DISEASE: ICD-10-CM

## 2021-08-03 RX ORDER — FAMOTIDINE 20 MG/1
20 TABLET, FILM COATED ORAL DAILY
Qty: 90 TABLET | Refills: 1 | Status: SHIPPED | OUTPATIENT
Start: 2021-08-03 | End: 2021-10-19 | Stop reason: SDUPTHER

## 2021-08-03 NOTE — TELEPHONE ENCOUNTER
Medication:   Requested Prescriptions     Pending Prescriptions Disp Refills    famotidine (PEPCID) 20 MG tablet [Pharmacy Med Name: FAMOTIDINE 20MG TABLETS] 90 tablet 1     Sig: TAKE 1 TABLET BY MOUTH DAILY     Last Filled:  01/14/21    Last appt: 4/1/2021   Next appt: Visit date not found    Last OARRS: No flowsheet data found.

## 2021-08-03 NOTE — TELEPHONE ENCOUNTER
The Nurse called to say that pt a1c is at 8 she stated that pt told her it was a  9 last time it was taken

## 2021-08-04 NOTE — TELEPHONE ENCOUNTER
Call Kmiberley Elena at [employer]. Patient's most recent hemoglobin A1c done by endocrinology on 7/15/2021 was 9.2%.

## 2021-09-06 DIAGNOSIS — E78.2 MIXED HYPERLIPIDEMIA: ICD-10-CM

## 2021-09-06 DIAGNOSIS — I10 ESSENTIAL HYPERTENSION: ICD-10-CM

## 2021-09-07 RX ORDER — ROSUVASTATIN CALCIUM 20 MG/1
20 TABLET, COATED ORAL DAILY
Qty: 90 TABLET | Refills: 0 | Status: SHIPPED | OUTPATIENT
Start: 2021-09-07 | End: 2021-12-06

## 2021-09-07 RX ORDER — LOSARTAN POTASSIUM AND HYDROCHLOROTHIAZIDE 25; 100 MG/1; MG/1
1 TABLET ORAL DAILY
Qty: 90 TABLET | Refills: 0 | Status: SHIPPED | OUTPATIENT
Start: 2021-09-07 | End: 2021-12-06

## 2021-09-07 NOTE — TELEPHONE ENCOUNTER
Medication:   Requested Prescriptions     Pending Prescriptions Disp Refills    rosuvastatin (CRESTOR) 20 MG tablet [Pharmacy Med Name: ROSUVASTATIN 20MG TABLETS] 90 tablet 1     Sig: TAKE 1 TABLET BY MOUTH DAILY    losartan-hydroCHLOROthiazide (HYZAAR) 100-25 MG per tablet [Pharmacy Med Name: LOSARTAN/HCTZ 100/25MG TABLETS] 90 tablet 1     Sig: TAKE 1 TABLET BY MOUTH DAILY     Last Filled: 2.25.21    Last appt: 4/1/2021   Next appt: Visit date not found  Needs OV    Last Lipid:   Lab Results   Component Value Date    CHOL 112 03/22/2021    TRIG 117 03/22/2021    HDL 43 03/22/2021    HDL 43 01/23/2012    LDLCALC 46 03/22/2021

## 2021-09-07 NOTE — TELEPHONE ENCOUNTER
Call patient to schedule appointment for hypertension, hyperlipidemia, laryngal pharyngeal reflux, vitamin D deficiency. She was last seen on 4/1/2021 and has no appointment scheduled.

## 2021-10-14 ENCOUNTER — TELEPHONE (OUTPATIENT)
Dept: PRIMARY CARE CLINIC | Age: 71
End: 2021-10-14

## 2021-10-14 NOTE — TELEPHONE ENCOUNTER
Pt has appt with Dr. Salomón Lomax next week on 10.19.21 and asking if she needs to do any labs before that appt please call her back

## 2021-10-15 DIAGNOSIS — E78.2 MIXED HYPERLIPIDEMIA: ICD-10-CM

## 2021-10-15 DIAGNOSIS — E55.9 VITAMIN D DEFICIENCY: ICD-10-CM

## 2021-10-15 LAB
A/G RATIO: 1.3 (ref 1.1–2.2)
ALBUMIN SERPL-MCNC: 3.9 G/DL (ref 3.4–5)
ALP BLD-CCNC: 107 U/L (ref 40–129)
ALT SERPL-CCNC: 20 U/L (ref 10–40)
ANION GAP SERPL CALCULATED.3IONS-SCNC: 12 MMOL/L (ref 3–16)
AST SERPL-CCNC: 21 U/L (ref 15–37)
BILIRUB SERPL-MCNC: <0.2 MG/DL (ref 0–1)
BUN BLDV-MCNC: 26 MG/DL (ref 7–20)
CALCIUM SERPL-MCNC: 9.2 MG/DL (ref 8.3–10.6)
CHLORIDE BLD-SCNC: 100 MMOL/L (ref 99–110)
CHOLESTEROL, TOTAL: 120 MG/DL (ref 0–199)
CO2: 28 MMOL/L (ref 21–32)
CREAT SERPL-MCNC: 1.2 MG/DL (ref 0.6–1.2)
CREATININE URINE: 107.2 MG/DL (ref 28–259)
GFR AFRICAN AMERICAN: 54
GFR NON-AFRICAN AMERICAN: 44
GLOBULIN: 3 G/DL
GLUCOSE BLD-MCNC: 193 MG/DL (ref 70–99)
HDLC SERPL-MCNC: 37 MG/DL (ref 40–60)
LDL CHOLESTEROL CALCULATED: 59 MG/DL
MICROALBUMIN UR-MCNC: 10.7 MG/DL
MICROALBUMIN/CREAT UR-RTO: 99.8 MG/G (ref 0–30)
POTASSIUM SERPL-SCNC: 4.2 MMOL/L (ref 3.5–5.1)
SODIUM BLD-SCNC: 140 MMOL/L (ref 136–145)
TOTAL PROTEIN: 6.9 G/DL (ref 6.4–8.2)
TRIGL SERPL-MCNC: 122 MG/DL (ref 0–150)
VITAMIN D 25-HYDROXY: 39.3 NG/ML
VLDLC SERPL CALC-MCNC: 24 MG/DL

## 2021-10-15 NOTE — TELEPHONE ENCOUNTER
Call patient. I do not need to order any labs for her appointment with me. She already had lab orders from Dr. Roxi Hutchinson which would cover any labs I would want to see. Addison Frederick

## 2021-10-16 LAB
ESTIMATED AVERAGE GLUCOSE: 223.1 MG/DL
HBA1C MFR BLD: 9.4 %

## 2021-10-19 ENCOUNTER — VIRTUAL VISIT (OUTPATIENT)
Dept: PRIMARY CARE CLINIC | Age: 71
End: 2021-10-19
Payer: MEDICARE

## 2021-10-19 DIAGNOSIS — Z23 NEED FOR TDAP VACCINATION: ICD-10-CM

## 2021-10-19 DIAGNOSIS — E55.9 VITAMIN D DEFICIENCY: ICD-10-CM

## 2021-10-19 DIAGNOSIS — Z12.31 ENCOUNTER FOR SCREENING MAMMOGRAM FOR BREAST CANCER: ICD-10-CM

## 2021-10-19 DIAGNOSIS — K21.9 LARYNGOPHARYNGEAL REFLUX DISEASE: ICD-10-CM

## 2021-10-19 DIAGNOSIS — E78.2 MIXED HYPERLIPIDEMIA: ICD-10-CM

## 2021-10-19 DIAGNOSIS — I10 ESSENTIAL HYPERTENSION: Primary | ICD-10-CM

## 2021-10-19 PROBLEM — N18.9 CHRONIC KIDNEY DISEASE (CKD): Status: ACTIVE | Noted: 2021-10-19

## 2021-10-19 PROCEDURE — 3017F COLORECTAL CA SCREEN DOC REV: CPT | Performed by: INTERNAL MEDICINE

## 2021-10-19 PROCEDURE — 4040F PNEUMOC VAC/ADMIN/RCVD: CPT | Performed by: INTERNAL MEDICINE

## 2021-10-19 PROCEDURE — 1090F PRES/ABSN URINE INCON ASSESS: CPT | Performed by: INTERNAL MEDICINE

## 2021-10-19 PROCEDURE — G8427 DOCREV CUR MEDS BY ELIG CLIN: HCPCS | Performed by: INTERNAL MEDICINE

## 2021-10-19 PROCEDURE — 1123F ACP DISCUSS/DSCN MKR DOCD: CPT | Performed by: INTERNAL MEDICINE

## 2021-10-19 PROCEDURE — 99214 OFFICE O/P EST MOD 30 MIN: CPT | Performed by: INTERNAL MEDICINE

## 2021-10-19 PROCEDURE — G8399 PT W/DXA RESULTS DOCUMENT: HCPCS | Performed by: INTERNAL MEDICINE

## 2021-10-19 RX ORDER — FAMOTIDINE 20 MG/1
20 TABLET, FILM COATED ORAL DAILY
Qty: 90 TABLET | Refills: 1 | Status: SHIPPED | OUTPATIENT
Start: 2021-10-19 | End: 2022-03-07

## 2021-10-19 SDOH — ECONOMIC STABILITY: FOOD INSECURITY: WITHIN THE PAST 12 MONTHS, THE FOOD YOU BOUGHT JUST DIDN'T LAST AND YOU DIDN'T HAVE MONEY TO GET MORE.: NEVER TRUE

## 2021-10-19 SDOH — ECONOMIC STABILITY: FOOD INSECURITY: WITHIN THE PAST 12 MONTHS, YOU WORRIED THAT YOUR FOOD WOULD RUN OUT BEFORE YOU GOT MONEY TO BUY MORE.: NEVER TRUE

## 2021-10-19 ASSESSMENT — SOCIAL DETERMINANTS OF HEALTH (SDOH): HOW HARD IS IT FOR YOU TO PAY FOR THE VERY BASICS LIKE FOOD, HOUSING, MEDICAL CARE, AND HEATING?: NOT HARD AT ALL

## 2021-10-19 NOTE — PROGRESS NOTES
1 TABLET BY MOUTH DAILY Yes Neyda Khanna MD   insulin glargine (LANTUS) 100 UNIT/ML injection vial Inject 28 Units into the skin 2 times daily Yes Jan Cook MD   Dulaglutide (TRULICITY) 1.5 DK/3.2UT SOPN ADMINISTER 0.5 ML(1.5MG) UNDER THE SKIN 1 TIME A WEEK Yes Jan Cook MD   hydrocortisone 2.5 % cream APPLY EXTERNALLY TO THE AFFECTED AREA TWICE DAILY Yes Neyda Khanna MD   vitamin D (ERGOCALCIFEROL) 1.25 MG (47118 UT) CAPS capsule TAKE 1 CAPSULE BY MOUTH 1 TIME A WEEK Yes Neyda Khanna MD   blood glucose test strips (ACCU-CHEK ANJU PLUS) strip TEST BLOOD SUGAR THREE TIMES DAILY Yes Jan Cook MD   Blood Pressure Monitoring (BLOOD PRESSURE MONITOR/L CUFF) MISC Use to monitor blood pressure Yes Neyda Khanna MD   ciclopirox (LOPROX) 0.77 % cream APPLY TO GREAT TOES DAILY Yes Historical Provider, MD   urea (CARMOL) 20 % cream Apply topically as needed Yes Historical Provider, MD   GABAPENTIN PO Take by mouth nightly Yes Historical Provider, MD   promethazine (PHENERGAN) 25 MG tablet Take 1 tablet by mouth every 6 hours as needed for Nausea Yes Neyda Khanna MD   insulin lispro, 1 Unit Dial, (HUMALOG KWIKPEN) 100 UNIT/ML SOPN Inject 7 units TID with meals +SSI Yes Jan Cook MD   Insulin Syringe-Needle U-100 (INSULIN SYRINGE 1CC/31GX5/16\") 31G X 5/16\" 1 ML MISC Use five times daily.  Yes Kirstie Franz MD   UNKNOWN TO PATIENT Yellow and orange capped eye drops for BP in eye Yes Historical Provider, MD   Insulin Pen Needle (B-D UF III MINI PEN NEEDLES) 31G X 5 MM MISC 1 each by Does not apply route 2 times daily Yes Marizol Barreto MD   ACCU-CHEK SOFTCLIX LANCETS MISC USE FOUR TIMES DAILY Yes Kirstie Franz MD   Blood Glucose Monitoring Suppl (ACCU-CHEK ANJU PLUS) W/DEVICE KIT Use to monitor blood sugars Yes Neyda Khanna MD   verapamil (VERELAN PM) 100 MG CP24 extended release capsule TAKE 1 CAPSULE BY MOUTH DAILY  Patient not taking: Reported on 10/19/2021  Dk Cole MD       Social History     Tobacco Use    Smoking status: Never Smoker    Smokeless tobacco: Never Used   Vaping Use    Vaping Use: Never used   Substance Use Topics    Alcohol use: No    Drug use: No        Allergies   Allergen Reactions    Januvia [Sitagliptin Phosphate] Swelling     Side effects    Actos [Pioglitazone] Diarrhea    Avandia [Rosiglitazone] Diarrhea and Nausea Only    Dye [Iodides] Rash    Jardiance [Empagliflozin] Diarrhea    Metformin Diarrhea     Nausea     ,   Past Medical History:   Diagnosis Date    Dizziness     Hearing loss     Hyperlipidemia     Hypertension     Laryngopharyngeal reflux disease 11/13/2019    Morbidly obese ([address] 10/29/2020    Tinnitus     Type II or unspecified type diabetes mellitus without mention of complication, not stated as uncontrolled     Uncontrolled type 2 diabetes mellitus with diabetic nephropathy ([facility] 3/17/2021    Urinary, incontinence, stress female 4/28/2020    Vitamin D deficiency 11/13/2019       PHYSICAL EXAMINATION:  [ INSTRUCTIONS:  \"[x]\" Indicates a positive item  \"[]\" Indicates a negative item  -- DELETE ALL ITEMS NOT EXAMINED]  Vital Signs: (As obtained by patient/caregiver or practitioner observation)    Blood pressure-  Heart rate-    Respiratory rate-    Temperature-  Pulse oximetry-   Patient-Reported Vitals 10/19/2021   Patient-Reported Weight 222lbs   Patient-Reported Height 66in      Constitutional: [x] Appears well-developed and well-nourished [x] No apparent distress      [] Abnormal-   Mental status  [x] Alert and awake  [x] Oriented to person/place/time [x]Able to follow commands      Eyes:  EOM    [x]  Normal  [] Abnormal-  Sclera  [x]  Normal  [] Abnormal -         Discharge [x]  None visible  [] Abnormal -    HENT:   [x] Normocephalic, atraumatic.   [] Abnormal   [] Mouth/Throat: Mucous membranes are moist.     External Ears [x] Normal  [] Abnormal-     Neck: [x] No visualized mass Pulmonary/Chest: [x] Respiratory effort normal.  [x] No visualized signs of difficulty breathing or respiratory distress        [] Abnormal-      Musculoskeletal:   [] Normal gait with no signs of ataxia         [x] Normal range of motion of neck        [] Abnormal-       Neurological:        [x] No Facial Asymmetry (Cranial nerve 7 motor function) (limited exam to video visit)          [x] No gaze palsy        [] Abnormal-         Skin:        [x] No significant exanthematous lesions or discoloration noted on facial skin         [] Abnormal-            Psychiatric:       [x] Normal Affect [] No Hallucinations        [] Abnormal-     Other pertinent observable physical exam findings-     ASSESSMENT/PLAN:  1. Essential hypertension  -stable  -Continue same medications  -Low sodium diet  -Regular aerobic exercise      2. Mixed hyperlipidemia  -Continue same medications  -Low fat, low cholesterol diet  -Regular aerobic exercise    3. Laryngopharyngeal reflux disease  - stable  - continue famotidine (PEPCID) 20 MG tablet; Take 1 tablet by mouth daily  Dispense: 90 tablet; Refill: 1  -Decrease caffeine, avoid spicy foods, avoid tomato based foods  -Eat small meals instead of large meals  -Wait 2-3 hours after eating before lying down    4. Vitamin D deficiency  -stable  -Continue same medications    5. Encounter for screening mammogram for breast cancer  - SHERIN DIGITAL SCREEN W OR WO CAD BILATERAL; Future    6. Need for Tdap vaccination  -Pt given Rx for Tdap vaccine to have done at their pharmacy  - Tetanus-Diphth-Acell Pertussis ([address]) 5-2.5-18.5 LF-MCG/0.5 injection; Inject 0.5 mLs into the muscle once for 1 dose  Dispense: 0.5 mL; Refill: 0      Return in about 8 weeks (around 12/16/2021) for Medicare WILLY Mix, was evaluated through a synchronous (real-time) audio-video encounter. The patient (or guardian if applicable) is aware that this is a billable service.  Verbal consent to proceed has been obtained within the past 12 months. The visit was conducted pursuant to the emergency declaration under the [address] [place] [address] and the [employer] General Act. Patient identification was verified, and a caregiver was present when appropriate. The patient was located in a state where the provider was credentialed to provide care. Total time spent on this encounter: Not billed by time    --Ezequiel Toro MD on 10/29/2021 at 7:43 PM    An electronic signature was used to authenticate this note.

## 2021-10-29 PROBLEM — E78.2 MIXED HYPERLIPIDEMIA: Status: ACTIVE | Noted: 2021-10-29

## 2021-10-29 ASSESSMENT — ENCOUNTER SYMPTOMS
SINUS PRESSURE: 0
NAUSEA: 0
SORE THROAT: 0
COUGH: 0
CHEST TIGHTNESS: 0
SHORTNESS OF BREATH: 0
DIARRHEA: 0
BLOOD IN STOOL: 0
CONSTIPATION: 0
ABDOMINAL PAIN: 0
VOMITING: 0
RHINORRHEA: 0
WHEEZING: 0

## 2021-10-29 NOTE — PATIENT INSTRUCTIONS
1. Essential hypertension  -stable  -Continue same medications  -Low sodium diet  -Regular aerobic exercise      2. Mixed hyperlipidemia  -Continue same medications  -Low fat, low cholesterol diet  -Regular aerobic exercise    3. Laryngopharyngeal reflux disease  - stable  - continue famotidine (PEPCID) 20 MG tablet; Take 1 tablet by mouth daily  Dispense: 90 tablet; Refill: 1  -Decrease caffeine, avoid spicy foods, avoid tomato based foods  -Eat small meals instead of large meals  -Wait 2-3 hours after eating before lying down    4. Vitamin D deficiency  -stable  -Continue same medications    5. Encounter for screening mammogram for breast cancer  - [facility] DIGITAL SCREEN W OR WO CAD BILATERAL; Future    6. Need for Tdap vaccination  -Pt given Rx for Tdap vaccine to have done at their pharmacy  - Tetanus-Diphth-Acell Pertussis ([address]) 5-2.5-18.5 LF-MCG/0.5 injection;  Inject 0.5 mLs into the muscle once for 1 dose  Dispense: 0.5 mL; Refill: 0

## 2021-12-05 DIAGNOSIS — I10 ESSENTIAL HYPERTENSION: ICD-10-CM

## 2021-12-05 DIAGNOSIS — E78.2 MIXED HYPERLIPIDEMIA: ICD-10-CM

## 2021-12-06 RX ORDER — ROSUVASTATIN CALCIUM 20 MG/1
20 TABLET, COATED ORAL DAILY
Qty: 90 TABLET | Refills: 0 | Status: SHIPPED | OUTPATIENT
Start: 2021-12-06 | End: 2022-03-08

## 2021-12-06 RX ORDER — LOSARTAN POTASSIUM AND HYDROCHLOROTHIAZIDE 25; 100 MG/1; MG/1
1 TABLET ORAL DAILY
Qty: 90 TABLET | Refills: 0 | Status: SHIPPED | OUTPATIENT
Start: 2021-12-06 | End: 2022-03-08

## 2021-12-06 NOTE — TELEPHONE ENCOUNTER
Medication:   Requested Prescriptions     Pending Prescriptions Disp Refills    rosuvastatin (CRESTOR) 20 MG tablet [Pharmacy Med Name: ROSUVASTATIN 20MG TABLETS] 90 tablet 0     Sig: TAKE 1 TABLET BY MOUTH DAILY    losartan-hydroCHLOROthiazide (HYZAAR) 100-25 MG per tablet [Pharmacy Med Name: LOSARTAN/HCTZ 100/25MG TABLETS] 90 tablet 0     Sig: TAKE 1 TABLET BY MOUTH DAILY     Last Filled: 9.7.21 9.7.21    Last appt: 10/19/2021   Next appt: 12/16/2021    Last OARRS: No flowsheet data found.

## 2021-12-14 ENCOUNTER — TELEPHONE (OUTPATIENT)
Dept: PRIMARY CARE CLINIC | Age: 71
End: 2021-12-14

## 2021-12-14 DIAGNOSIS — R35.0 URINARY FREQUENCY: Primary | ICD-10-CM

## 2021-12-14 DIAGNOSIS — R35.0 URINARY FREQUENCY: ICD-10-CM

## 2021-12-14 LAB
BILIRUBIN URINE: NEGATIVE
BLOOD, URINE: NEGATIVE
CLARITY: CLEAR
COLOR: YELLOW
EPITHELIAL CELLS, UA: 2 /HPF (ref 0–5)
GLUCOSE URINE: NEGATIVE MG/DL
HYALINE CASTS: 0 /LPF (ref 0–8)
KETONES, URINE: NEGATIVE MG/DL
LEUKOCYTE ESTERASE, URINE: NEGATIVE
MICROSCOPIC EXAMINATION: YES
NITRITE, URINE: NEGATIVE
PH UA: 6 (ref 5–8)
PROTEIN UA: 30 MG/DL
RBC UA: 2 /HPF (ref 0–4)
SPECIFIC GRAVITY UA: 1.02 (ref 1–1.03)
URINE REFLEX TO CULTURE: ABNORMAL
URINE TYPE: ABNORMAL
UROBILINOGEN, URINE: 0.2 E.U./DL
WBC UA: 1 /HPF (ref 0–5)

## 2021-12-14 NOTE — TELEPHONE ENCOUNTER
Patient stopped in complaining of urinary frequency and UTI symptoms. She requested that a UA be ordered so she can get it done at the lab and have the results for her appointment on Friday with Dr. Emmanuel Closs.

## 2021-12-29 ENCOUNTER — TELEPHONE (OUTPATIENT)
Dept: PRIMARY CARE CLINIC | Age: 71
End: 2021-12-29

## 2021-12-29 DIAGNOSIS — E55.9 VITAMIN D DEFICIENCY: ICD-10-CM

## 2021-12-29 RX ORDER — ERGOCALCIFEROL 1.25 MG/1
CAPSULE ORAL
Qty: 12 CAPSULE | Refills: 1 | Status: SHIPPED | OUTPATIENT
Start: 2021-12-29 | End: 2022-05-17

## 2021-12-29 NOTE — TELEPHONE ENCOUNTER
Pt calling, Right leg pain lasted a few hours, this occurred a few weeks ago. No redness or warmth to touch. Pt concerned could be DVT. Pt DOES not have pain now, happened 1 time and now gone.

## 2021-12-29 NOTE — TELEPHONE ENCOUNTER
Medication:   Requested Prescriptions     Pending Prescriptions Disp Refills    vitamin D (ERGOCALCIFEROL) 1.25 MG (92843 UT) CAPS capsule 12 capsule 1     Sig: TAKE 1 CAPSULE BY MOUTH 1 TIME A WEEK     Last Filled:  7/7/21    Last appt: 10/19/2021   Next appt: Visit date not found

## 2022-01-30 PROBLEM — N18.31 STAGE 3A CHRONIC KIDNEY DISEASE (HCC): Status: ACTIVE | Noted: 2022-01-30

## 2022-01-31 ENCOUNTER — VIRTUAL VISIT (OUTPATIENT)
Dept: ENDOCRINOLOGY | Age: 72
End: 2022-01-31
Payer: MEDICARE

## 2022-01-31 DIAGNOSIS — N18.31 STAGE 3A CHRONIC KIDNEY DISEASE (HCC): ICD-10-CM

## 2022-01-31 DIAGNOSIS — I10 PRIMARY HYPERTENSION: ICD-10-CM

## 2022-01-31 DIAGNOSIS — E55.9 VITAMIN D DEFICIENCY: ICD-10-CM

## 2022-01-31 DIAGNOSIS — E66.01 CLASS 2 SEVERE OBESITY WITH SERIOUS COMORBIDITY AND BODY MASS INDEX (BMI) OF 36.0 TO 36.9 IN ADULT, UNSPECIFIED OBESITY TYPE (HCC): ICD-10-CM

## 2022-01-31 DIAGNOSIS — E78.2 MIXED HYPERLIPIDEMIA: ICD-10-CM

## 2022-01-31 PROCEDURE — G8427 DOCREV CUR MEDS BY ELIG CLIN: HCPCS | Performed by: INTERNAL MEDICINE

## 2022-01-31 PROCEDURE — 1090F PRES/ABSN URINE INCON ASSESS: CPT | Performed by: INTERNAL MEDICINE

## 2022-01-31 PROCEDURE — 1123F ACP DISCUSS/DSCN MKR DOCD: CPT | Performed by: INTERNAL MEDICINE

## 2022-01-31 PROCEDURE — 4040F PNEUMOC VAC/ADMIN/RCVD: CPT | Performed by: INTERNAL MEDICINE

## 2022-01-31 PROCEDURE — 3017F COLORECTAL CA SCREEN DOC REV: CPT | Performed by: INTERNAL MEDICINE

## 2022-01-31 PROCEDURE — 99215 OFFICE O/P EST HI 40 MIN: CPT | Performed by: INTERNAL MEDICINE

## 2022-01-31 PROCEDURE — 2022F DILAT RTA XM EVC RTNOPTHY: CPT | Performed by: INTERNAL MEDICINE

## 2022-01-31 PROCEDURE — G8399 PT W/DXA RESULTS DOCUMENT: HCPCS | Performed by: INTERNAL MEDICINE

## 2022-01-31 PROCEDURE — 3046F HEMOGLOBIN A1C LEVEL >9.0%: CPT | Performed by: INTERNAL MEDICINE

## 2022-01-31 RX ORDER — LATANOPROST 50 UG/ML
SOLUTION/ DROPS OPHTHALMIC
COMMUNITY
Start: 2021-12-07

## 2022-01-31 RX ORDER — DULAGLUTIDE 1.5 MG/.5ML
INJECTION, SOLUTION SUBCUTANEOUS
Qty: 2 ML | Refills: 0 | Status: CANCELLED | OUTPATIENT
Start: 2022-01-31

## 2022-01-31 RX ORDER — INSULIN LISPRO 100 [IU]/ML
INJECTION, SOLUTION INTRAVENOUS; SUBCUTANEOUS
Qty: 30 ML | Refills: 2 | Status: CANCELLED | OUTPATIENT
Start: 2022-01-31

## 2022-01-31 RX ORDER — INSULIN GLARGINE 100 [IU]/ML
30 INJECTION, SOLUTION SUBCUTANEOUS 2 TIMES DAILY
Qty: 30 ML | Refills: 1 | Status: SHIPPED | OUTPATIENT
Start: 2022-01-31 | End: 2022-05-31

## 2022-01-31 RX ORDER — TIMOLOL MALEATE 5 MG/ML
SOLUTION/ DROPS OPHTHALMIC
COMMUNITY
Start: 2021-12-30

## 2022-01-31 RX ORDER — DORZOLAMIDE HCL 20 MG/ML
SOLUTION/ DROPS OPHTHALMIC 3 TIMES DAILY
COMMUNITY
Start: 2021-12-30

## 2022-01-31 RX ORDER — DULAGLUTIDE 1.5 MG/.5ML
1.5 INJECTION, SOLUTION SUBCUTANEOUS WEEKLY
Qty: 2 ML | Refills: 1 | Status: SHIPPED | OUTPATIENT
Start: 2022-01-31 | End: 2022-05-13

## 2022-01-31 RX ORDER — CALCIUM CARB/VITAMIN D3/VIT K1 500-100-40
TABLET,CHEWABLE ORAL
Qty: 500 EACH | Refills: 3 | Status: SHIPPED | OUTPATIENT
Start: 2022-01-31

## 2022-01-31 NOTE — PROGRESS NOTES
Lee Rosa is a 70 y.o. female who is being evaluated for Type 2 diabetes mellitus. H.    2022    TELEHEALTH EVALUATION -- Audio/Visual (During GEPAV-54 public health emergency)    Patient provided verbal consent to use the video visit. HPI:    Lee Rosa (:  1950) has requested an audio/video evaluation for the following concern(s):      Current symptoms/problems include hperglycemia and are worsening. Type 2 diabetes mellitus, uncontrolled, with neuropathy (HCC) [E11.40, E11.65]    Diagnosed with Type 2 diabetes mellitus in . Comorbid conditions: Hypertension, hyperlipidemia, obesity, Neuropathy, Nephropathy and Retinopathy    Current diabetic medications include: Lantus 28 units twice daily, Humalog 7 units 3 times daily with meals, Trulicity 1.5 mg weekly  Correction scale of Humalo200-2 units, 2012504 units, 251-3006 units, >301 -units 8 units   Intolerance to diabetes medications: Yes Actosleg swelling, Metformin     Weight trend: stable  Prior visit with dietician: yes  Current diet: on average, 2 meals per day  Current exercise: no     Current monitoring regimen: home blood tests - 4 times daily  Has brought blood glucose log/meter:  No  Home blood sugar records: fasting range: 100-130 and postprandial range: 100-130  Any episodes of hypoglycemia? No  Hypoglycemia frequency and time(s):    Does patient have Glucagon emergency kit? No  Does patient have rapid acting carbohydrate? No  Does patient wear a medic alert bracelet or necklace? No      2. Uncontrolled type 2 diabetes mellitus with diabetic nephropathy (HCC)  No urination problems    3. Type 2 diabetes, uncontrolled, with retinopathy ([address]  No vision changes    4. Vitamin D deficiency  No fatigue    5. Mixed hyperlipidemia  No muscle pain    6. Essential hypertension  No headaches    7. Obesity   Eats healthy    8.   CKD stage 3a  No urination problems        Past Medical History:   Diagnosis Date    Dizziness     Hearing loss     Hyperlipidemia     Hypertension     Laryngopharyngeal reflux disease 2019    Morbidly obese ([address] 10/29/2020    Tinnitus     Type II or unspecified type diabetes mellitus without mention of complication, not stated as uncontrolled     Uncontrolled type 2 diabetes mellitus with diabetic nephropathy ([address] 3/17/2021    Urinary, incontinence, stress female 2020    Vitamin D deficiency 2019      Patient Active Problem List   Diagnosis    HTN (hypertension)    Hyperlipidemia    Diabetes mellitus ([address]    Non-compliance with treatment    Diabetes mellitus type 2, uncontrolled ([address]    Bilateral impacted cerumen    Vitamin D deficiency    Laryngopharyngeal reflux disease    Cataract of both eyes    Age-related cataract of right eye    Urinary, incontinence, stress female    Uncontrolled type 2 diabetes mellitus with complication, with long-term current use of insulin ([address]    Morbidly obese ([address]    Urinary tract infection without hematuria    Type 2 diabetes mellitus, uncontrolled, with neuropathy ([address]    Uncontrolled type 2 diabetes mellitus with diabetic nephropathy (HCC)    Type 2 diabetes, uncontrolled, with retinopathy ([address]    Hypertension    Class 2 obesity with body mass index (BMI) of 36.0 to 36.9 in adult    Dizziness    Chronic kidney disease (CKD)    Mixed hyperlipidemia    Stage 3a chronic kidney disease (HCC)     Past Surgical History:   Procedure Laterality Date     SECTION      x 1     EYE SURGERY      right eye- cataract     TONSILLECTOMY       Social History     Socioeconomic History    Marital status: Single     Spouse name: Not on file    Number of children: Not on file    Years of education: Not on file    Highest education level: Not on file   Occupational History    Occupation:  at [address] [place] [address] [employer] Use    Smoking status: Never Smoker    Smokeless tobacco: Never Used Vaping Use    Vaping Use: Never used   Substance and Sexual Activity    Alcohol use: No    Drug use: No    Sexual activity: Not Currently   Other Topics Concern    Not on file   Social History Narrative    Not on file     Social Determinants of Health     Financial Resource Strain: Low Risk     Difficulty of Paying Living Expenses: Not hard at all   Food Insecurity: No Food Insecurity    Worried About Running Out of Food in the Last Year: Never true    Monica of Food in the Last Year: Never true   Transportation Needs:     Lack of Transportation (Medical): Not on file    Lack of Transportation (Non-Medical):  Not on file   Physical Activity:     Days of Exercise per Week: Not on file    Minutes of Exercise per Session: Not on file   Stress:     Feeling of Stress : Not on file   Social Connections:     Frequency of Communication with Friends and Family: Not on file    Frequency of Social Gatherings with Friends and Family: Not on file    Attends Yarsani Services: Not on file    Active Member of [address] [place] [address] or Organizations: Not on file    Attends Club or Organization Meetings: Not on file    Marital Status: Not on file   Intimate Partner Violence:     Fear of Current or Ex-Partner: Not on file    Emotionally Abused: Not on file    Physically Abused: Not on file    Sexually Abused: Not on file   Housing Stability:     Unable to Pay for Housing in the Last Year: Not on file    Number of [place] in the Last Year: Not on file    Unstable Housing in the Last Year: Not on file     Family History   Problem Relation Age of Onset    Diabetes Sister     Heart Disease Sister 77    Diabetes Brother         had amputation    High Blood Pressure Brother     Cancer Father      Current Outpatient Medications   Medication Sig Dispense Refill    dorzolamide (TRUSOPT) 2 % ophthalmic solution INSTILL 1 DROP IN BOTH EYES TWICE DAILY      latanoprost (XALATAN) 0.005 % ophthalmic solution INSTILL 1 DROP IN BOTH EYES EVERY DAY AT BEDTIME      timolol (TIMOPTIC) 0.5 % ophthalmic solution PUT 1 DROP INTO BOTH EYES THREE TIMES DAILY      insulin glargine (LANTUS) 100 UNIT/ML injection vial Inject 30 Units into the skin 2 times daily 30 mL 1    Insulin Syringe-Needle U-100 (INSULIN SYRINGE 1CC/31GX5/16\") 31G X 5/16\" 1 ML MISC Use five times daily.  500 each 3    insulin lispro (HUMALOG) 100 UNIT/ML injection vial Inject 8 units TID with meals plus correction dose, total daily dose up to 60 units 6 mL 1    Dulaglutide (TRULICITY) 1.5 YS/2.8JR SOPN Inject 1.5 mg into the skin once a week 2 mL 1    vitamin D (ERGOCALCIFEROL) 1.25 MG (31149 UT) CAPS capsule TAKE 1 CAPSULE BY MOUTH 1 TIME A WEEK 12 capsule 1    rosuvastatin (CRESTOR) 20 MG tablet TAKE 1 TABLET BY MOUTH DAILY 90 tablet 0    losartan-hydroCHLOROthiazide (HYZAAR) 100-25 MG per tablet TAKE 1 TABLET BY MOUTH DAILY 90 tablet 0    famotidine (PEPCID) 20 MG tablet Take 1 tablet by mouth daily 90 tablet 1    hydrocortisone 2.5 % cream APPLY EXTERNALLY TO THE AFFECTED AREA TWICE DAILY 30 g 0    blood glucose test strips (ACCU-CHEK ANJU PLUS) strip TEST BLOOD SUGAR THREE TIMES DAILY 300 strip 2    Blood Pressure Monitoring (BLOOD PRESSURE MONITOR/L CUFF) MISC Use to monitor blood pressure 1 each 0    ciclopirox (LOPROX) 0.77 % cream APPLY TO GREAT TOES DAILY      urea (CARMOL) 20 % cream Apply topically as needed      promethazine (PHENERGAN) 25 MG tablet Take 1 tablet by mouth every 6 hours as needed for Nausea 12 tablet 0    UNKNOWN TO PATIENT Yellow and orange capped eye drops for BP in eye      Insulin Pen Needle (B-D UF III MINI PEN NEEDLES) 31G X 5 MM MISC 1 each by Does not apply route 2 times daily 100 each 6    ACCU-CHEK SOFTCLIX LANCETS MISC USE FOUR TIMES DAILY 300 each 6    Blood Glucose Monitoring Suppl (ACCU-CHEK ANJU PLUS) W/DEVICE KIT Use to monitor blood sugars 1 kit 0    verapamil (VERELAN PM) 100 MG CP24 extended release capsule TAKE 1 CAPSULE BY MOUTH DAILY (Patient not taking: Reported on 10/19/2021) 90 capsule 1    GABAPENTIN PO Take by mouth nightly (Patient not taking: Reported on 2022)       No current facility-administered medications for this visit.      Allergies   Allergen Reactions    Januvia [Sitagliptin Phosphate] Swelling     Side effects    Actos [Pioglitazone] Diarrhea    Avandia [Rosiglitazone] Diarrhea and Nausea Only    Dye [Iodides] Rash    Jardiance [Empagliflozin] Diarrhea    Metformin Diarrhea     Nausea       Family Status   Relation Name Status    Sister  Alive    Brother  Alive    Mother   at age 28        unknown    Father   at age 39        lung       Lab Review:    Lab Results   Component Value Date    WBC 11.3 10/15/2021    HGB 10.2 10/15/2021    HCT 32.1 10/15/2021    MCV 94.7 10/15/2021     10/15/2021     Lab Results   Component Value Date     10/15/2021     10/15/2021    K 4.2 10/15/2021    K 4.1 10/15/2021     10/15/2021     10/15/2021    CO2 28 10/15/2021    CO2 28 10/15/2021    BUN 26 10/15/2021    BUN 26 10/15/2021    CREATININE 1.2 10/15/2021    CREATININE 1.3 10/15/2021    GLUCOSE 193 10/15/2021    GLUCOSE 192 10/15/2021    CALCIUM 9.2 10/15/2021    CALCIUM 9.0 10/15/2021    PROT 6.9 10/15/2021    PROT 7.1 2012    LABALBU 3.9 10/15/2021    BILITOT <0.2 10/15/2021    ALKPHOS 107 10/15/2021    AST 21 10/15/2021    ALT 20 10/15/2021    LABGLOM 44 10/15/2021    LABGLOM 40 10/15/2021    GFRAA 54 10/15/2021    GFRAA 49 10/15/2021    GFRAA >60 2013    AGRATIO 1.3 10/15/2021    GLOB 3.0 10/15/2021     Lab Results   Component Value Date    TSH 3.48 2021     Lab Results   Component Value Date    LABA1C 9.4 10/15/2021     Lab Results   Component Value Date    .1 10/15/2021     Lab Results   Component Value Date    CHOL 120 10/15/2021     Lab Results   Component Value Date    TRIG 122 10/15/2021     Lab Results   Component Value Date    HDL 37 10/15/2021    HDL 43 01/23/2012     Lab Results   Component Value Date    LDLCALC 59 10/15/2021     Lab Results   Component Value Date    LABVLDL 24 10/15/2021     No results found for: [facility]  Lab Results   Component Value Date    LABMICR YES 12/14/2021     Lab Results   Component Value Date    VITD25 39.3 10/15/2021        Review of Systems:  Constitutional: no fatigue, no fever, no recent weight gain, no recent weight loss, no changes in appetite  Eyes: no eye pain, no change in vision, no eye redness, no eye irritation, no double vision  Ears, nose, throat: no nasal congestion, no sore throat, no earache, no decrease in hearing, no hoarseness, no dry mouth, no sinus problems, no difficulty swallowing, no neck lumps, no dental problems, no mouth sores, no ringing in ears  Pulmonary: no shortness of breath, no wheezing, no dyspnea on exertion, no cough  Cardiovascular: no chest pain, no lower extremity edema, no orthopnea, no intermittent leg claudication, no palpitations  Gastrointestinal: no abdominal pain, no nausea, no vomiting, no diarrhea, no constipation, no dysphagia, no heartburn, no bloating  Genitourinary: no dysuria, no urinary incontinence, no urinary hesitancy, no urinary frequency, no feelings of urinary urgency, no nocturia  Musculoskeletal: no joint swelling, no joint stiffness, no joint pain, no muscle cramps, no muscle pain, no bone pain  Integument/Breast: no hair loss, no skin rashes, no skin lesions, no itching, no dry skin  Neurological: no numbness, no tingling, no weakness, no confusion, no headaches, no dizziness, no fainting, no tremors, no decrease in memory, no balance problems  Psychiatric: no anxiety, no depression, no insomnia  Hematologic/Lymphatic: no tendency for easy bleeding, no swollen lymph nodes, no tendency for easy bruising  Immunology: no seasonal allergies, no frequent infections, no frequent illnesses  Endocrine: no temperature intolerance    There were no vitals taken for this visit. Wt Readings from Last 3 Encounters:   07/15/21 227 lb (103 kg)   21 222 lb 9.6 oz (101 kg)   21 222 lb 9.6 oz (101 kg)     There is no height or weight on file to calculate BMI. OBJECTIVE:  Constitutional: no apparent distress, well developed and well nourished  Mental status: alert and awake, oriented to person, place and time, able to follow commands  Psychiatric: judgement and insight and normal, recent and remote memory are intact, mood and affect are normal  Skin: skin inspection appears normal, no significant exanthematous lesions or discoloration noted on facial skin  Head and Face: head and face inspection revealed no abnormalities, normocephalic, atraumatic  Eyes: no lid or conjunctival swelling, erythema or discharge, sclera appears normal  Ears/Nose: external inspection of ears and nose revealed no abnormalities, hearing is grossly normal  Oropharynx/Mouth/Face: lips are normal with no lesions, the voice quality was normal  Neck: neck is symmetric, no visualized mass  Pulmonary/chest: respiratory effort normal, no generalized signs of difficulty breathing or signs of respiratory distress  Musculoskeletal: normal station, normal range of motion of neck  Neurological: no facial asymmetry, normal general cortical function    ASSESSMENT/PLAN:  1. Type 2 diabetes mellitus, uncontrolled, with neuropathy (HCC)  Hemoglobin A1c 9.4  Send blood glucose records for review  Current diabetic medications include: Lantus 28 units twice daily, Humalog 7 units 3 times daily with meals, Trulicity 1.5 mg weekly  Correction scale of Humalo200-2 units, 2012504 units, 251-3006 units, >301 -units 8 units   - GABAPENTIN PO; Take by mouth nightly  - Hemoglobin A1C; Future  - Lipid Panel; Future  - Microalbumin / Creatinine Urine Ratio; Future  - Comprehensive Metabolic Panel;  Future  - [facility] Diabetes Education (Non Care Coord Patient), [facility]    2. Uncontrolled type 2 diabetes mellitus with diabetic nephropathy ([facility]  Continue monitoring  Microalbumin creatinine ratio 81.699.8  - Hemoglobin A1C; Future  - Lipid Panel; Future  - Microalbumin / Creatinine Urine Ratio; Future  - Comprehensive Metabolic Panel; Future    3. Type 2 diabetes, uncontrolled, with retinopathy ([facility]  Follow with ophthalmology    4. Vitamin D deficiency  25OH vitamin D 4742.739.3  Vitamin D 50,000 international units weekly  - Vitamin D 25 Hydroxy; Future    5. Mixed hyperlipidemia  LDL 59  Triglycerides 122  HDL 37  Rosuvastatin 20 mg daily  - Lipid Panel; Future    6. Essential hypertension  Verapamil, losartan/hydrochlorthiazide    7. Obesity  Diet, exercise    8. CKD stage 3a  Creatinine 1.3  GFR 49       Reviewed and/or ordered clinical lab results Yes  Reviewed and/or ordered radiology tests Yes MRI brain  Reviewed and/or ordered other diagnostic tests No  Discussed test results with performing physician No  Independently reviewed image, tracing, or specimen No  Made a decision to obtain old records No  Reviewed and summarized old records Yes   25OH VITAMIN D 47  Hemoglobin A1c 7.8  Hypertension  Hyperlipidemia  Obtained history from other than patient No    Nadja Castro was counseled regarding symptoms of diabetes, hyperlipidemia, hypertension diagnosis, course and complications of disease if inadequately treated, side effects of medications, diagnosis, treatment options, and prognosis, risks, benefits, complications, and alternatives of treatment, labs, imaging and other studies and treatment targets and goals, insulin correction scale, prandial injections, diet. She understands instructions and counseling. These diagnosis were discussed and reviewed with the patient including the advantages of drug therapy. She was counseled at this visit on the following: diabetes complication prevention and foot care.     Nadja Castro is a 70 y.o. female being evaluated by a Virtual Visit (video visit) encounter, including two-way audio and video communication, in lieu of an in-person visit due to coronavirus emergency, to address concerns as mentioned in history and assessment and plan. Patient identification was verified at the start of the visit. I conducted an interview, performed a limited exam by video and educated the patient on my assessment and plan. Due to this being a TeleHealth encounter (During [facility] public health emergency), evaluation of the following organ systems was limited: Vitals/Constitutional/EENT/Resp/CV/GI//MS/Neuro/Skin/Heme-Lymph-Imm. Pursuant to the emergency declaration under the [address] [place] [address] and the [employer] and [employer] Act, this Virtual Visit was conducted with patient's (and/or legal guardian's) consent, to reduce the patient's risk of exposure to COVID-19 and provide necessary medical care. The patient (and/or legal guardian) has also been advised to contact this office for worsening conditions or problems, and seek emergency medical treatment and/or call 911 if deemed necessary. Total time spent on this encounter via Telehealth (synchronous, real-time audio/visual connection): 40 min    See assessment, plan and counseling note for counseling and care coordination details. Services were provided through a video synchronous discussion virtually to substitute for in-person clinic visit. Persons participating in the telehealth service: provider - Jory Vo MD and patient Jessica Memo. Provider was located at her office. Patient was located at home. --Jory Vo MD on 1/31/2022 at 9:57 AM    An electronic signature was used to authenticate this note. Return in about 3 months (around 4/30/2022) for diabetes.     Electronically signed by Jory Vo MD on 1/31/2022 at 9:57 AM

## 2022-02-14 RX ORDER — BLOOD SUGAR DIAGNOSTIC
STRIP MISCELLANEOUS
Qty: 300 STRIP | Refills: 2 | Status: SHIPPED | OUTPATIENT
Start: 2022-02-14

## 2022-03-02 ENCOUNTER — TELEPHONE (OUTPATIENT)
Dept: PRIMARY CARE CLINIC | Age: 72
End: 2022-03-02

## 2022-03-02 NOTE — TELEPHONE ENCOUNTER
Pt is having dirrhea very watery some stool since yesterday feels really gassy stomach aches some what can she do when she burps has a bad test left over     [phone]

## 2022-03-03 ENCOUNTER — TELEPHONE (OUTPATIENT)
Dept: PRIMARY CARE CLINIC | Age: 72
End: 2022-03-03

## 2022-03-03 ENCOUNTER — TELEMEDICINE (OUTPATIENT)
Dept: PRIMARY CARE CLINIC | Age: 72
End: 2022-03-03
Payer: MEDICARE

## 2022-03-03 DIAGNOSIS — K92.89 GAS BLOAT SYNDROME: ICD-10-CM

## 2022-03-03 DIAGNOSIS — R14.2 BELCHING: ICD-10-CM

## 2022-03-03 DIAGNOSIS — R11.0 NAUSEA: ICD-10-CM

## 2022-03-03 DIAGNOSIS — R10.10 PAIN OF UPPER ABDOMEN: Primary | ICD-10-CM

## 2022-03-03 DIAGNOSIS — R19.7 DIARRHEA, UNSPECIFIED TYPE: ICD-10-CM

## 2022-03-03 PROCEDURE — 3017F COLORECTAL CA SCREEN DOC REV: CPT | Performed by: INTERNAL MEDICINE

## 2022-03-03 PROCEDURE — 1123F ACP DISCUSS/DSCN MKR DOCD: CPT | Performed by: INTERNAL MEDICINE

## 2022-03-03 PROCEDURE — 4040F PNEUMOC VAC/ADMIN/RCVD: CPT | Performed by: INTERNAL MEDICINE

## 2022-03-03 PROCEDURE — 99214 OFFICE O/P EST MOD 30 MIN: CPT | Performed by: INTERNAL MEDICINE

## 2022-03-03 PROCEDURE — 1090F PRES/ABSN URINE INCON ASSESS: CPT | Performed by: INTERNAL MEDICINE

## 2022-03-03 PROCEDURE — G8399 PT W/DXA RESULTS DOCUMENT: HCPCS | Performed by: INTERNAL MEDICINE

## 2022-03-03 PROCEDURE — G8427 DOCREV CUR MEDS BY ELIG CLIN: HCPCS | Performed by: INTERNAL MEDICINE

## 2022-03-03 RX ORDER — FAMOTIDINE 20 MG/1
20 TABLET, FILM COATED ORAL 2 TIMES DAILY
Qty: 60 TABLET | Refills: 0 | Status: SHIPPED | OUTPATIENT
Start: 2022-03-03 | End: 2022-05-20

## 2022-03-03 RX ORDER — PROMETHAZINE HYDROCHLORIDE 25 MG/1
25 TABLET ORAL EVERY 6 HOURS PRN
Qty: 10 TABLET | Refills: 0 | Status: SHIPPED | OUTPATIENT
Start: 2022-03-03 | End: 2022-08-25 | Stop reason: ALTCHOICE

## 2022-03-03 RX ORDER — DICYCLOMINE HYDROCHLORIDE 10 MG/1
10 CAPSULE ORAL 3 TIMES DAILY PRN
Qty: 15 CAPSULE | Refills: 0 | Status: SHIPPED | OUTPATIENT
Start: 2022-03-03 | End: 2022-08-25

## 2022-03-03 ASSESSMENT — ENCOUNTER SYMPTOMS
VOMITING: 0
BLOOD IN STOOL: 0
DIARRHEA: 1
ABDOMINAL DISTENTION: 0
SHORTNESS OF BREATH: 0
ABDOMINAL PAIN: 1
BACK PAIN: 0
CONSTIPATION: 0

## 2022-03-03 NOTE — PROGRESS NOTES
3/3/2022    TELEHEALTH EVALUATION -- Audio/Visual (During GVXVD-20 public health emergency)    Chief Complaint   Patient presents with    Diarrhea    Gas    Abdominal Pain     Started Tuesday    Other     Belching and has bead taste in her mouth       HPI:    Bernadine Bloom (:  1950) has requested an audio/video evaluation for the following concern(s):    Patient complains of abdominal pain since 3/1/22. Patient states abdominal pain is crampy. Patient states abdominal pain is starting to subside. Patient complains of diarrhea since 3/1/22. Patient has diarrhea at least twice in an hour. Patient states she has had 2 nights in row where she woke up and had diarrhea in her bed. Patient states she took Pepto Bismol last night and the diarrhea has slowed down some from every hour. Patient states she was able to lie back down at 5:00am this morning. Patient complains of gas and bloating since 3/1/22. Patient states she feels nauseated but no vomiting. Patient complains of belching and bad taste in her mouth. Patient states she has had recent antibiotics for a toothache. Review of Systems   Constitutional: Negative for appetite change, chills, fatigue and fever. HENT: Negative for congestion, ear pain, postnasal drip, rhinorrhea, sinus pressure, sinus pain, sneezing and sore throat. Eyes: Negative for visual disturbance. Respiratory: Negative for cough, chest tightness, shortness of breath and wheezing. Cardiovascular: Negative for chest pain, palpitations and leg swelling. Gastrointestinal: Positive for abdominal pain, diarrhea and nausea. Negative for abdominal distention, blood in stool, constipation and vomiting. Genitourinary: Negative for dysuria, frequency and hematuria. Musculoskeletal: Negative for arthralgias, back pain and myalgias. Skin: Negative for rash.    Neurological: Negative for dizziness, tremors, syncope, weakness, light-headedness, numbness and headaches. Psychiatric/Behavioral: Negative for dysphoric mood and sleep disturbance. The patient is not nervous/anxious. Prior to Visit Medications    Medication Sig Taking? Authorizing Provider   blood glucose test strips (ACCU-CHEK ANJU PLUS) strip USE TO TEST BLOOD SUGAR THREE TIMES DAILY AS DIRECTED Yes Josue Cheung MD   dorzolamide (TRUSOPT) 2 % ophthalmic solution 3 times daily  Yes Historical Provider, MD   latanoprost (XALATAN) 0.005 % ophthalmic solution INSTILL 1 DROP IN BOTH EYES EVERY DAY AT BEDTIME Yes Historical Provider, MD   timolol (TIMOPTIC) 0.5 % ophthalmic solution PUT 1 DROP INTO BOTH EYES THREE TIMES DAILY Yes Historical Provider, MD   insulin glargine (LANTUS) 100 UNIT/ML injection vial Inject 30 Units into the skin 2 times daily Yes Josue Cheung MD   Insulin Syringe-Needle U-100 (INSULIN SYRINGE 1CC/31GX5/16\") 31G X 5/16\" 1 ML MISC Use five times daily.  Yes Josue Cheung MD   insulin lispro (HUMALOG) 100 UNIT/ML injection vial Inject 8 units TID with meals plus correction dose, total daily dose up to 60 units Yes Josue Cheung MD   Dulaglutide (TRULICITY) 1.5 IT/3.0SY SOPN Inject 1.5 mg into the skin once a week Yes Josue Cheung MD   vitamin D (ERGOCALCIFEROL) 1.25 MG (09722 UT) CAPS capsule TAKE 1 CAPSULE BY MOUTH 1 TIME A WEEK Yes Mark Bates MD   rosuvastatin (CRESTOR) 20 MG tablet TAKE 1 TABLET BY MOUTH DAILY Yes Mark Bates MD   losartan-hydroCHLOROthiazide (HYZAAR) 100-25 MG per tablet TAKE 1 TABLET BY MOUTH DAILY Yes Mark Bates MD   famotidine (PEPCID) 20 MG tablet Take 1 tablet by mouth daily Yes Mark Bates MD   hydrocortisone 2.5 % cream APPLY EXTERNALLY TO THE AFFECTED AREA TWICE DAILY  Patient taking differently: 2 times daily as needed APPLY EXTERNALLY TO THE AFFECTED AREA TWICE DAILY Yes Mark Bates MD   Blood Pressure Monitoring (BLOOD PRESSURE MONITOR/L CUFF) MISC Use to monitor blood pressure Yes Mark Bates MD ciclopirox (LOPROX) 0.77 % cream APPLY TO GREAT TOES DAILY Yes Historical Provider, MD   urea (CARMOL) 20 % cream Apply topically as needed Yes Historical Provider, MD   UNKNOWN TO PATIENT Yellow and orange capped eye drops for BP in eye Yes Historical Provider, MD   Insulin Pen Needle (B-D UF III MINI PEN NEEDLES) 31G X 5 MM MISC 1 each by Does not apply route 2 times daily Yes Nataliya Freire MD   ACCU-CHEK SOFTCLIX LANCETS MISC USE FOUR TIMES DAILY Yes Buster Fleischer, MD   Blood Glucose Monitoring Suppl (ACCU-CHEK ANJU PLUS) W/DEVICE KIT Use to monitor blood sugars Yes Jim Juarez MD       Social History     Tobacco Use    Smoking status: Never Smoker    Smokeless tobacco: Never Used   Vaping Use    Vaping Use: Never used   Substance Use Topics    Alcohol use: No    Drug use: No        Allergies   Allergen Reactions    Januvia [Sitagliptin Phosphate] Swelling     Side effects    Actos [Pioglitazone] Diarrhea    Avandia [Rosiglitazone] Diarrhea and Nausea Only    Dye [Iodides] Rash    Jardiance [Empagliflozin] Diarrhea    Metformin Diarrhea     Nausea     ,   Past Medical History:   Diagnosis Date    Dizziness     Hearing loss     Hyperlipidemia     Hypertension     Laryngopharyngeal reflux disease 11/13/2019    Morbidly obese ([facility] [address] 10/29/2020    Tinnitus     Type II or unspecified type diabetes mellitus without mention of complication, not stated as uncontrolled     Uncontrolled type 2 diabetes mellitus with diabetic nephropathy ([facility] [address] 3/17/2021    Urinary, incontinence, stress female 4/28/2020    Vitamin D deficiency 11/13/2019       PHYSICAL EXAMINATION:  [ INSTRUCTIONS:  \"[x]\" Indicates a positive item  \"[]\" Indicates a negative item  -- DELETE ALL ITEMS NOT EXAMINED]  Vital Signs: (As obtained by patient/caregiver or practitioner observation)    Blood pressure-  Heart rate-    Respiratory rate-    Temperature-  Pulse oximetry-   Patient-Reported Vitals 3/3/2022 Patient-Reported Weight -   Patient-Reported Height 5'6\"        Constitutional: [x] Appears well-developed and well-nourished [x] No apparent distress      [] Abnormal-   Mental status  [x] Alert and awake  [x] Oriented to person/place/time [x]Able to follow commands      Eyes:  EOM    [x]  Normal  [] Abnormal-  Sclera  [x]  Normal  [] Abnormal -         Discharge [x]  None visible  [] Abnormal -    HENT:   [x] Normocephalic, atraumatic. [] Abnormal   [] Mouth/Throat: Mucous membranes are moist.     External Ears [x] Normal  [] Abnormal-     Neck: [x] No visualized mass     Pulmonary/Chest: [x] Respiratory effort normal.  [x] No visualized signs of difficulty breathing or respiratory distress        [] Abnormal-      Musculoskeletal:   [] Normal gait with no signs of ataxia         [x] Normal range of motion of neck        [] Abnormal-       Neurological:        [x] No Facial Asymmetry (Cranial nerve 7 motor function) (limited exam to video visit)          [x] No gaze palsy        [] Abnormal-         Skin:        [x] No significant exanthematous lesions or discoloration noted on facial skin         [] Abnormal-            Psychiatric:       [x] Normal Affect [] No Hallucinations        [] Abnormal-     Other pertinent observable physical exam findings-         ASSESSMENT/PLAN:  1. Pain of upper abdomen  - famotidine (PEPCID) 20 MG tablet; Take 1 tablet by mouth 2 times daily  Dispense: 60 tablet; Refill: 0  - dicyclomine (BENTYL) 10 MG capsule; Take 1 capsule by mouth 3 times daily as needed (abdominal cramping)  Dispense: 15 capsule; Refill: 0  - Comprehensive Metabolic Panel; Future  - CBC with Auto Differential; Future    2. Diarrhea, unspecified type  - Comprehensive Metabolic Panel; Future  - CBC with Auto Differential; Future  - C DIFF TOXIN/ANTIGEN; Future  - stay hydrated  - Gatorade    3. Nausea  - promethazine (PHENERGAN) 25 MG tablet;  Take 1 tablet by mouth every 6 hours as needed for Nausea Dispense: 10 tablet; Refill: 0  - Comprehensive Metabolic Panel; Future  - CBC with Auto Differential; Future    4. Gas bloat syndrome  - over the counter Gas-X as needed    5. Belching  - famotidine (PEPCID) 20 MG tablet; Take 1 tablet by mouth 2 times daily  Dispense: 60 tablet; Refill: 0  -Decrease caffeine, avoid spicy foods, avoid tomato based foods  -Eat small meals instead of large meals  -Wait 2-3 hours after eating before lying down        Return in about 4 days (around 3/7/2022) for abdominal pain, diarrhea, nausea, and belching. Jayro Loaiza, was evaluated through a synchronous (real-time) audio-video encounter. The patient (or guardian if applicable) is aware that this is a billable service. Verbal consent to proceed has been obtained within the past 12 months. The visit was conducted pursuant to the emergency declaration under the [address] [place] [address], [address] [place] [address] and the [employer] Act. Patient identification was verified, and a caregiver was present when appropriate. The patient was located in a state where the provider was credentialed to provide care. Total time spent on this encounter: Not billed by time    --Benny Blake MD on 3/9/2022 at 3:04 PM    An electronic signature was used to authenticate this note.

## 2022-03-03 NOTE — TELEPHONE ENCOUNTER
Within this Telehealth Consent, the terms you and yours refer to the person using the Telehealth Service (Service), or in the case of a use of the Service by or on behalf of a minor, you and yours refer to and include (i) the parent or legal guardian who provides consent to the use of the Service by such minor or uses the Service on behalf of such minor, and (ii) the minor for whom consent is being provided or on whose behalf the Service is being utilized. When using Service, you will be consulting with your health care providers via the use of Telehealth.   Telehealth involves the delivery of healthcare services using electronic communications, information technology or other means between a healthcare provider and a patient who are not in the same physical location. Telehealth may be used for diagnosis, treatment, follow-up and/or patient education, and may include, but is not limited to, one or more of the following:    Electronic transmission of medical records, photo images, personal health information or other data between a patient and a healthcare provider    Interactions between a patient and healthcare provider via audio, video and/or data communications    Use of output data from medical devices, sound and video files    Anticipated Benefits   The use of Telehealth by your Provider(s) through the Service may have the following possible benefits:    Making it easier and more efficient for you to access medical care and treatment for the conditions treated by such Provider(s) utilizing the Service    Allowing you to obtain medical care and treatment by Provider(s) at times that are convenient for you    Enabling you to interact with Provider(s) without the necessity of an in-office appointment     Possible Risks   While the use of Telehealth can provide potential benefits for you, there are also potential risks associated with the use of Telehealth.  These risks include, but may not be limited to the following:    Your Provider(s) may not able to provide medical treatment for your particular condition and you may be required to seek alternative healthcare or emergency care services.  The electronic systems or other security protocols or safeguards used in the Service could fail, causing a breach of privacy of your medical or other information.  Given regulatory requirements in certain jurisdictions, your Provider(s) diagnosis and/or treatment options, especially pertaining to certain prescriptions, may be limited. Acceptance   1. You understand that Services will be provided via Telehealth. This process involves the use of HIPAA compliant and secure, real-time audio-visual interfacing with a qualified and appropriately trained provider located at [facility]. 2. You understand that, under no circumstances, will this session be recorded. 3. You understand that the Provider(s) at [facility] and other clinical participants will be party to the information obtained during the Telehealth session in accordance with best medical practices. 4. You understand that the information obtained during the Telehealth session will be used to help determine the most appropriate treatment options. 5. You understand that You have the right to revoke this consent at any point in time. 6. You understand that Telehealth is voluntary, and that continued treatment is not dependent upon consent. 7. You understand that, in the event of non-consent to Telehealth services and/or technical difficulties, you will obtain services as typically provided in the absence of Telehealth technology. 8. You understand that this consent will be kept in Your medical record. 9. No potential benefits from the use of Telehealth or specific results can be guaranteed. Your condition may not be cured or improved and, in some cases, may get worse.    10. There are limitations in the provision of medical care and treatment via Telehealth and the Service and you may not be able to receive diagnosis and/or treatment through the Service for every condition for which you seek diagnosis and/or treatment. 11. There are potential risks to the use of Telehealth, including but not limited to the risks described in this Telehealth Consent. 12. Your Provider(s) have discussed the use of Telehealth and the Service with you, including the benefits and risks of such and you have provided oral consent to your Provider(s) for the use of Telehealth and the Service. 15. You understand that it is your duty to provide your Provider(s) truthful, accurate and complete information, including all relevant information regarding care that you may have received or may be receiving from other healthcare providers outside of the Service. 14. You understand that each of your Provider(s) may determine in his or sole discretion that your condition is not suitable for diagnosis and/or treatment using the Service, and that you may need to seek medical care and treatment a specialist or other healthcare provider, outside of the Service. 15. You understand that you are fully responsible for payment for all services provided by Provider(s) or through use of the Service and that you may not be able to use third-party insurance. 16. You represent that (a) you have read this Telehealth Consent carefully, (b) you understand the risks and benefits of the Service and the use of Telehealth in the medical care and treatment provided to you by Provider(s) using the Service, and (c) you have the legal capacity and authority to provide this consent for yourself and/or the minor for which you are consenting under applicable federal and state laws, including laws relating to the age of [de-identified] and/or parental/guardian consent.    17. You give your informed consent to the use of Telehealth by Provider(s) using the Service under the terms described in the Terms of Service and this Telehealth Consent. The patient was read the following statement and has consented to the visit as of 3/3/22. The patient has been scheduled for their first telehealth visit on 3/3/22 with Dr. Seth Gonzalez

## 2022-03-04 ENCOUNTER — TELEPHONE (OUTPATIENT)
Dept: ENDOCRINOLOGY | Age: 72
End: 2022-03-04

## 2022-03-04 DIAGNOSIS — E55.9 VITAMIN D DEFICIENCY: ICD-10-CM

## 2022-03-04 DIAGNOSIS — R11.0 NAUSEA: ICD-10-CM

## 2022-03-04 DIAGNOSIS — E66.01 CLASS 2 SEVERE OBESITY WITH SERIOUS COMORBIDITY AND BODY MASS INDEX (BMI) OF 36.0 TO 36.9 IN ADULT, UNSPECIFIED OBESITY TYPE (HCC): ICD-10-CM

## 2022-03-04 DIAGNOSIS — R19.7 DIARRHEA, UNSPECIFIED TYPE: ICD-10-CM

## 2022-03-04 DIAGNOSIS — E78.2 MIXED HYPERLIPIDEMIA: ICD-10-CM

## 2022-03-04 DIAGNOSIS — R10.10 PAIN OF UPPER ABDOMEN: ICD-10-CM

## 2022-03-04 DIAGNOSIS — I10 PRIMARY HYPERTENSION: ICD-10-CM

## 2022-03-04 LAB
A/G RATIO: 1.4 (ref 1.1–2.2)
ALBUMIN SERPL-MCNC: 3.9 G/DL (ref 3.4–5)
ALP BLD-CCNC: 93 U/L (ref 40–129)
ALT SERPL-CCNC: 12 U/L (ref 10–40)
ANION GAP SERPL CALCULATED.3IONS-SCNC: 15 MMOL/L (ref 3–16)
AST SERPL-CCNC: 18 U/L (ref 15–37)
BASOPHILS ABSOLUTE: 0 K/UL (ref 0–0.2)
BASOPHILS RELATIVE PERCENT: 0.4 %
BILIRUB SERPL-MCNC: 0.4 MG/DL (ref 0–1)
BUN BLDV-MCNC: 19 MG/DL (ref 7–20)
CALCIUM SERPL-MCNC: 8.5 MG/DL (ref 8.3–10.6)
CHLORIDE BLD-SCNC: 96 MMOL/L (ref 99–110)
CO2: 24 MMOL/L (ref 21–32)
CREAT SERPL-MCNC: 1.2 MG/DL (ref 0.6–1.2)
CREATININE URINE: 63 MG/DL (ref 28–259)
EOSINOPHILS ABSOLUTE: 0.3 K/UL (ref 0–0.6)
EOSINOPHILS RELATIVE PERCENT: 2.5 %
GFR AFRICAN AMERICAN: 53
GFR NON-AFRICAN AMERICAN: 44
GLUCOSE BLD-MCNC: 175 MG/DL (ref 70–99)
HCT VFR BLD CALC: 32.7 % (ref 36–48)
HEMOGLOBIN: 11 G/DL (ref 12–16)
LYMPHOCYTES ABSOLUTE: 3.1 K/UL (ref 1–5.1)
LYMPHOCYTES RELATIVE PERCENT: 27 %
MCH RBC QN AUTO: 30.2 PG (ref 26–34)
MCHC RBC AUTO-ENTMCNC: 33.6 G/DL (ref 31–36)
MCV RBC AUTO: 90.1 FL (ref 80–100)
MICROALBUMIN UR-MCNC: 6.6 MG/DL
MICROALBUMIN/CREAT UR-RTO: 104.8 MG/G (ref 0–30)
MONOCYTES ABSOLUTE: 0.7 K/UL (ref 0–1.3)
MONOCYTES RELATIVE PERCENT: 5.9 %
NEUTROPHILS ABSOLUTE: 7.3 K/UL (ref 1.7–7.7)
NEUTROPHILS RELATIVE PERCENT: 64.2 %
PDW BLD-RTO: 13 % (ref 12.4–15.4)
PLATELET # BLD: 409 K/UL (ref 135–450)
PMV BLD AUTO: 6.6 FL (ref 5–10.5)
POTASSIUM SERPL-SCNC: 4.5 MMOL/L (ref 3.5–5.1)
RBC # BLD: 3.63 M/UL (ref 4–5.2)
SODIUM BLD-SCNC: 135 MMOL/L (ref 136–145)
T3 FREE: 2.3 PG/ML (ref 2.3–4.2)
T4 FREE: 1.2 NG/DL (ref 0.9–1.8)
TOTAL PROTEIN: 6.7 G/DL (ref 6.4–8.2)
TSH SERPL DL<=0.05 MIU/L-ACNC: 2.02 UIU/ML (ref 0.27–4.2)
VITAMIN D 25-HYDROXY: 42.9 NG/ML
WBC # BLD: 11.4 K/UL (ref 4–11)

## 2022-03-05 DIAGNOSIS — I10 ESSENTIAL HYPERTENSION: ICD-10-CM

## 2022-03-05 DIAGNOSIS — E78.2 MIXED HYPERLIPIDEMIA: ICD-10-CM

## 2022-03-05 NOTE — TELEPHONE ENCOUNTER
Please inform patient that I reviewed lab results:  Protein in the urine is unchanged, still elevated. Needs better diabetes control. Kidney function is similar to before. Slightly decreased. Vitamin D was very good. Thyroid test was very good. Still has anemia, please discuss with family doctor. Blood glucose is high, 175. Recommend to increase Lantus to 32 units twice a day. Let me know if it is okay and I will update record. I do not see appointment scheduled. Please schedule appointment for 3-month follow-up.

## 2022-03-07 ENCOUNTER — TELEPHONE (OUTPATIENT)
Dept: PRIMARY CARE CLINIC | Age: 72
End: 2022-03-07

## 2022-03-07 ENCOUNTER — TELEMEDICINE (OUTPATIENT)
Dept: PRIMARY CARE CLINIC | Age: 72
End: 2022-03-07
Payer: MEDICARE

## 2022-03-07 DIAGNOSIS — R19.7 DIARRHEA, UNSPECIFIED TYPE: Primary | ICD-10-CM

## 2022-03-07 DIAGNOSIS — R26.89 BALANCE DISORDER: ICD-10-CM

## 2022-03-07 DIAGNOSIS — R11.0 NAUSEA: ICD-10-CM

## 2022-03-07 DIAGNOSIS — R10.10 PAIN OF UPPER ABDOMEN: ICD-10-CM

## 2022-03-07 DIAGNOSIS — R14.2 BELCHING: ICD-10-CM

## 2022-03-07 DIAGNOSIS — K92.89 GAS BLOAT SYNDROME: ICD-10-CM

## 2022-03-07 DIAGNOSIS — R42 DIZZINESS: ICD-10-CM

## 2022-03-07 PROCEDURE — G8427 DOCREV CUR MEDS BY ELIG CLIN: HCPCS | Performed by: INTERNAL MEDICINE

## 2022-03-07 PROCEDURE — 3017F COLORECTAL CA SCREEN DOC REV: CPT | Performed by: INTERNAL MEDICINE

## 2022-03-07 PROCEDURE — 99213 OFFICE O/P EST LOW 20 MIN: CPT | Performed by: INTERNAL MEDICINE

## 2022-03-07 PROCEDURE — 1123F ACP DISCUSS/DSCN MKR DOCD: CPT | Performed by: INTERNAL MEDICINE

## 2022-03-07 PROCEDURE — 1090F PRES/ABSN URINE INCON ASSESS: CPT | Performed by: INTERNAL MEDICINE

## 2022-03-07 PROCEDURE — 4040F PNEUMOC VAC/ADMIN/RCVD: CPT | Performed by: INTERNAL MEDICINE

## 2022-03-07 PROCEDURE — G8399 PT W/DXA RESULTS DOCUMENT: HCPCS | Performed by: INTERNAL MEDICINE

## 2022-03-07 ASSESSMENT — ENCOUNTER SYMPTOMS
DIARRHEA: 0
BLOOD IN STOOL: 0
BACK PAIN: 0
CONSTIPATION: 0
SHORTNESS OF BREATH: 0
ABDOMINAL DISTENTION: 0
VOMITING: 0
NAUSEA: 0

## 2022-03-07 NOTE — TELEPHONE ENCOUNTER
Pt called to be informed if the MD findings and recommendations listed below     Please inform patient that I reviewed lab results:  Protein in the urine is unchanged, still elevated. Needs better diabetes control. Kidney function is similar to before. Slightly decreased. Vitamin D was very good. Thyroid test was very good. Still has anemia, please discuss with family doctor. Blood glucose is high, 175. Recommend to increase Lantus to 32 units twice a day. Let me know if it is okay and I will update record. I do not see appointment scheduled. Please schedule appointment for 3-month follow-up.       Pt voiced understanding     The pt is not scheduled for a follow up appt

## 2022-03-07 NOTE — PROGRESS NOTES
3/7/2022    TELEHEALTH EVALUATION -- Audio/Visual (During [facility]HCFJ-96 public health emergency)    Chief Complaint   Patient presents with    Abdominal Pain    Diarrhea    Nausea    Other     Belching       HPI:    Charli Acosta (:  1950) has requested an audio/video evaluation for the following concern(s):     Patient states the medications helped. Patient has taken famotidine, promethazine, and dicyclomine. Patient states her abdominal pain resolved. Patient states her diarrhea resolved Friday afternoon 3/4/22. Patient states her nausea resolved soon as she started Promethazine and it had started subsiding before it. Patient states belching and gas resolved. Patient states she has been drinking Gatorade and Ginger Ale. Patient had labs done. Patient states the stool test was not done because her stools were not watery. Patient states her equilibrium is off and if she leans over or looks up she has to brace herself to keep from falling. Balance is off for awhile. Patient states she doesn't feel comfortable walking and feels like she is going to fall. Review of Systems   Constitutional: Negative for appetite change, chills and fever. HENT: Negative for sore throat. Eyes: Negative for visual disturbance. Respiratory: Negative for cough, chest tightness and shortness of breath. Cardiovascular: Negative for chest pain, palpitations and leg swelling. Gastrointestinal: Negative for abdominal distention, abdominal pain, blood in stool, constipation, diarrhea, nausea and vomiting. Genitourinary: Negative for dysuria, frequency and hematuria. Musculoskeletal: Negative for back pain. Neurological: Positive for dizziness. Negative for syncope, light-headedness and headaches. Prior to Visit Medications    Medication Sig Taking?  Authorizing Provider   famotidine (PEPCID) 20 MG tablet Take 1 tablet by mouth 2 times daily Yes Mark Btaes MD   promethazine (PHENERGAN) 25 MG tablet Take 1 tablet by mouth every 6 hours as needed for Nausea Yes Lee Ann Daniel MD   dicyclomine (BENTYL) 10 MG capsule Take 1 capsule by mouth 3 times daily as needed (abdominal cramping) Yes Lee Ann Daniel MD   blood glucose test strips (ACCU-CHEK ANJU PLUS) strip USE TO TEST BLOOD SUGAR THREE TIMES DAILY AS DIRECTED Yes Xiao Brown MD   dorzolamide (TRUSOPT) 2 % ophthalmic solution 3 times daily  Yes Historical Provider, MD   latanoprost (XALATAN) 0.005 % ophthalmic solution INSTILL 1 DROP IN BOTH EYES EVERY DAY AT BEDTIME Yes Historical Provider, MD   timolol (TIMOPTIC) 0.5 % ophthalmic solution PUT 1 DROP INTO BOTH EYES THREE TIMES DAILY Yes Historical Provider, MD   insulin glargine (LANTUS) 100 UNIT/ML injection vial Inject 30 Units into the skin 2 times daily Yes Xiao Brown MD   Insulin Syringe-Needle U-100 (INSULIN SYRINGE 1CC/31GX5/16\") 31G X 5/16\" 1 ML MISC Use five times daily.  Yes Xiao Brown MD   insulin lispro (HUMALOG) 100 UNIT/ML injection vial Inject 8 units TID with meals plus correction dose, total daily dose up to 60 units Yes Xiao Brown MD   Dulaglutide (TRULICITY) 1.5 FP/8.8GM SOPN Inject 1.5 mg into the skin once a week Yes Xiao Brown MD   vitamin D (ERGOCALCIFEROL) 1.25 MG (93155 UT) CAPS capsule TAKE 1 CAPSULE BY MOUTH 1 TIME A WEEK Yes Lee Ann Daniel MD   rosuvastatin (CRESTOR) 20 MG tablet TAKE 1 TABLET BY MOUTH DAILY Yes Lee Ann Daniel MD   losartan-hydroCHLOROthiazide (HYZAAR) 100-25 MG per tablet TAKE 1 TABLET BY MOUTH DAILY Yes Lee Ann Daniel MD   hydrocortisone 2.5 % cream APPLY EXTERNALLY TO THE AFFECTED AREA TWICE DAILY  Patient taking differently: 2 times daily as needed APPLY EXTERNALLY TO THE AFFECTED AREA TWICE DAILY Yes Lee Ann Daniel MD   Blood Pressure Monitoring (BLOOD PRESSURE MONITOR/L CUFF) MISC Use to monitor blood pressure Yes Lee Ann Daniel MD   ciclopirox (LOPROX) 0.77 % cream APPLY TO GREAT TOES DAILY Yes Historical Provider, MD   urea (CARMOL) 20 % cream Apply topically as needed Yes Historical Provider, MD   UNKNOWN TO PATIENT Yellow and orange capped eye drops for BP in eye Yes Historical Provider, MD   Insulin Pen Needle (B-D UF III MINI PEN NEEDLES) 31G X 5 MM MISC 1 each by Does not apply route 2 times daily Yes Crystal Wright MD   ACCU-CHEK SOFTCLIX LANCETS MISC USE FOUR TIMES DAILY Yes Julisa Greco MD   Blood Glucose Monitoring Suppl ([address] [place] [address]) W/DEVICE KIT Use to monitor blood sugars Yes Ham Lux MD       Social History     Tobacco Use    Smoking status: Never Smoker    Smokeless tobacco: Never Used   Vaping Use    Vaping Use: Never used   Substance Use Topics    Alcohol use: No    Drug use: No        Allergies   Allergen Reactions    Januvia [Sitagliptin Phosphate] Swelling     Side effects    Actos [Pioglitazone] Diarrhea    Avandia [Rosiglitazone] Diarrhea and Nausea Only    Dye [Iodides] Rash    Jardiance [Empagliflozin] Diarrhea    Metformin Diarrhea     Nausea     ,   Past Medical History:   Diagnosis Date    Dizziness     Hearing loss     Hyperlipidemia     Hypertension     Laryngopharyngeal reflux disease 11/13/2019    Morbidly obese ([address] 10/29/2020    Tinnitus     Type II or unspecified type diabetes mellitus without mention of complication, not stated as uncontrolled     Uncontrolled type 2 diabetes mellitus with diabetic nephropathy ([address] 3/17/2021    Urinary, incontinence, stress female 4/28/2020    Vitamin D deficiency 11/13/2019       PHYSICAL EXAMINATION:  [ INSTRUCTIONS:  \"[x]\" Indicates a positive item  \"[]\" Indicates a negative item  -- DELETE ALL ITEMS NOT EXAMINED]  Vital Signs: (As obtained by patient/caregiver or practitioner observation)    Blood pressure-  Heart rate-    Respiratory rate-    Temperature-  Pulse oximetry-     Patient-Reported Vitals 3/3/2022   Patient-Reported Weight -   Patient-Reported Height 5'6\" Constitutional: [x] Appears well-developed and well-nourished [x] No apparent distress      [] Abnormal-   Mental status  [x] Alert and awake  [x] Oriented to person/place/time [x]Able to follow commands      Eyes:  EOM    [x]  Normal  [] Abnormal-  Sclera  [x]  Normal  [] Abnormal -         Discharge [x]  None visible  [] Abnormal -    HENT:   [x] Normocephalic, atraumatic.   [] Abnormal   [] Mouth/Throat: Mucous membranes are moist.     External Ears [x] Normal  [] Abnormal-     Neck: [x] No visualized mass     Pulmonary/Chest: [x] Respiratory effort normal.  [x] No visualized signs of difficulty breathing or respiratory distress        [] Abnormal-      Musculoskeletal:   [] Normal gait with no signs of ataxia         [x] Normal range of motion of neck        [] Abnormal-       Neurological:        [] No Facial Asymmetry (Cranial nerve 7 motor function) (limited exam to video visit)          [x] No gaze palsy        [] Abnormal-         Skin:        [x] No significant exanthematous lesions or discoloration noted on facial skin         [] Abnormal-            Psychiatric:       [x] Normal Affect [] No Hallucinations        [] Abnormal-     Other pertinent observable physical exam findings-     Lab Review   Orders Only on 03/04/2022   Component Date Value    WBC 03/04/2022 11.4*    RBC 03/04/2022 3.63*    Hemoglobin 03/04/2022 11.0*    Hematocrit 03/04/2022 32.7*    MCV 03/04/2022 90.1     MCH 03/04/2022 30.2     MCHC 03/04/2022 33.6     RDW 03/04/2022 13.0     Platelets 68/16/3920 409     MPV 03/04/2022 6.6     Neutrophils % 03/04/2022 64.2     Lymphocytes % 03/04/2022 27.0     Monocytes % 03/04/2022 5.9     Eosinophils % 03/04/2022 2.5     Basophils % 03/04/2022 0.4     Neutrophils Absolute 03/04/2022 7.3     Lymphocytes Absolute 03/04/2022 3.1     Monocytes Absolute 03/04/2022 0.7     Eosinophils Absolute 03/04/2022 0.3     Basophils Absolute 03/04/2022 0.0     Sodium 03/04/2022 135*    Potassium 03/04/2022 4.5     Chloride 03/04/2022 96*    CO2 03/04/2022 24     Anion Gap 03/04/2022 15     Glucose 03/04/2022 175*    BUN 03/04/2022 19     CREATININE 03/04/2022 1.2     GFR Non- 03/04/2022 44*    GFR  03/04/2022 53*    Calcium 03/04/2022 8.5     Total Protein 03/04/2022 6.7     Albumin 03/04/2022 3.9     Albumin/Globulin Ratio 03/04/2022 1.4     Total Bilirubin 03/04/2022 0.4     Alkaline Phosphatase 03/04/2022 93     ALT 03/04/2022 12     AST 03/04/2022 18     TSH 03/04/2022 2.02     T4 Free 03/04/2022 1.2     T3, Free 03/04/2022 2.3     Vit D, 25-Hydroxy 03/04/2022 42.9     Microalbumin, Random Uri* 03/04/2022 6.60*    Creatinine, Ur 03/04/2022 63.0     Microalbumin Creatinine * 03/04/2022 104.8*   Orders Only on 12/14/2021   Component Date Value    Color, UA 12/14/2021 YELLOW     Clarity, UA 12/14/2021 Clear     Glucose, Ur 12/14/2021 Negative     Bilirubin Urine 12/14/2021 Negative     Ketones, Urine 12/14/2021 Negative     Specific Gravity, UA 12/14/2021 1.016     Blood, Urine 12/14/2021 Negative     pH, UA 12/14/2021 6.0     Protein, UA 12/14/2021 30*    Urobilinogen, Urine 12/14/2021 0.2     Nitrite, Urine 12/14/2021 Negative     Leukocyte Esterase, Urine 12/14/2021 Negative     Microscopic Examination 12/14/2021 YES     Urine Type 12/14/2021 CLEANCATCH     Urine Reflex to Culture 12/14/2021 Not Indicated     Hyaline Casts, UA 12/14/2021 0     WBC, UA 12/14/2021 1     RBC, UA 12/14/2021 2     Epithelial Cells, UA 12/14/2021 2    Orders Only on 10/15/2021   Component Date Value    WBC 10/15/2021 11.3*    RBC 10/15/2021 3.39*    Hemoglobin 10/15/2021 10.2*    Hematocrit 10/15/2021 32.1*    MCV 10/15/2021 94.7     MCH 10/15/2021 30.0     MCHC 10/15/2021 31.7     RDW 10/15/2021 13.9     Platelets 16/46/2600 400     MPV 10/15/2021 7.1    Orders Only on 10/15/2021   Component Date Value    Phosphorus 10/15/2021 3.3 through a synchronous (real-time) audio-video encounter. The patient (or guardian if applicable) is aware that this is a billable service. Verbal consent to proceed has been obtained within the past 12 months. The visit was conducted pursuant to the emergency declaration under the [facility], [address] [place] [address] authority and the [employer] Act. Patient identification was verified, and a caregiver was present when appropriate. The patient was located in a state where the provider was credentialed to provide care. Total time spent on this encounter: Not billed by time    --Benny Blake MD on 3/17/2022 at 8:14 PM    An electronic signature was used to authenticate this note.

## 2022-03-07 NOTE — TELEPHONE ENCOUNTER
Medication:   Requested Prescriptions     Pending Prescriptions Disp Refills    rosuvastatin (CRESTOR) 20 MG tablet [Pharmacy Med Name: ROSUVASTATIN 20MG TABLETS] 90 tablet 0     Sig: TAKE 1 TABLET BY MOUTH DAILY    losartan-hydroCHLOROthiazide (HYZAAR) 100-25 MG per tablet [Pharmacy Med Name: LOSARTAN/HCTZ 100/25MG TABLETS] 90 tablet 0     Sig: TAKE 1 TABLET BY MOUTH DAILY       Last Filled:      Patient Phone Number: [phone] (home)     Last appt: 3/3/2022   Next appt: 3/7/2022    Last BMP:   Lab Results   Component Value Date     03/04/2022    K 4.5 03/04/2022    CL 96 03/04/2022    CO2 24 03/04/2022    ANIONGAP 15 03/04/2022    GLUCOSE 175 03/04/2022    BUN 19 03/04/2022    CREATININE 1.2 03/04/2022    LABGLOM 44 03/04/2022    GFRAA 53 03/04/2022    GFRAA >60 04/04/2013    CALCIUM 8.5 03/04/2022      Last CMP:   Lab Results   Component Value Date     03/04/2022    K 4.5 03/04/2022    CL 96 03/04/2022    CO2 24 03/04/2022    ANIONGAP 15 03/04/2022    GLUCOSE 175 03/04/2022    BUN 19 03/04/2022    CREATININE 1.2 03/04/2022    LABGLOM 44 03/04/2022    GFRAA 53 03/04/2022    GFRAA >60 04/04/2013    PROT 6.7 03/04/2022    PROT 7.1 01/23/2012    LABALBU 3.9 03/04/2022    AGRATIO 1.4 03/04/2022    BILITOT 0.4 03/04/2022    ALKPHOS 93 03/04/2022    ALT 12 03/04/2022    AST 18 03/04/2022    GLOB 3.0 10/15/2021     Last Renal Function:   Lab Results   Component Value Date     03/04/2022    K 4.5 03/04/2022    CL 96 03/04/2022    CO2 24 03/04/2022    GLUCOSE 175 03/04/2022    BUN 19 03/04/2022    CREATININE 1.2 03/04/2022    PHOS 3.3 10/15/2021    LABALBU 3.9 03/04/2022    CALCIUM 8.5 03/04/2022    GFR 60 04/04/2013    GFRAA 53 03/04/2022    GFRAA >60 04/04/2013       Last OARRS: No flowsheet data found.     Preferred Pharmacy:   [address], [address] [place] [address] [place] [address]  Phone: [phone] Fax: [phone]

## 2022-03-07 NOTE — TELEPHONE ENCOUNTER
Within this Telehealth Consent, the terms you and yours refer to the person using the Telehealth Service (Service), or in the case of a use of the Service by or on behalf of a minor, you and yours refer to and include (i) the parent or legal guardian who provides consent to the use of the Service by such minor or uses the Service on behalf of such minor, and (ii) the minor for whom consent is being provided or on whose behalf the Service is being utilized. When using Service, you will be consulting with your health care providers via the use of Telehealth.   Telehealth involves the delivery of healthcare services using electronic communications, information technology or other means between a healthcare provider and a patient who are not in the same physical location. Telehealth may be used for diagnosis, treatment, follow-up and/or patient education, and may include, but is not limited to, one or more of the following:    Electronic transmission of medical records, photo images, personal health information or other data between a patient and a healthcare provider    Interactions between a patient and healthcare provider via audio, video and/or data communications    Use of output data from medical devices, sound and video files    Anticipated Benefits   The use of Telehealth by your Provider(s) through the Service may have the following possible benefits:    Making it easier and more efficient for you to access medical care and treatment for the conditions treated by such Provider(s) utilizing the Service    Allowing you to obtain medical care and treatment by Provider(s) at times that are convenient for you    Enabling you to interact with Provider(s) without the necessity of an in-office appointment     Possible Risks   While the use of Telehealth can provide potential benefits for you, there are also potential risks associated with the use of Telehealth.  These risks include, but may not be limited to the following:    Your Provider(s) may not able to provide medical treatment for your particular condition and you may be required to seek alternative healthcare or emergency care services.  The electronic systems or other security protocols or safeguards used in the Service could fail, causing a breach of privacy of your medical or other information.  Given regulatory requirements in certain jurisdictions, your Provider(s) diagnosis and/or treatment options, especially pertaining to certain prescriptions, may be limited. Acceptance   1. You understand that Services will be provided via Telehealth. This process involves the use of HIPAA compliant and secure, real-time audio-visual interfacing with a qualified and appropriately trained provider located at [facility]. 2. You understand that, under no circumstances, will this session be recorded. 3. You understand that the Provider(s) at [facility] and other clinical participants will be party to the information obtained during the Telehealth session in accordance with best medical practices. 4. You understand that the information obtained during the Telehealth session will be used to help determine the most appropriate treatment options. 5. You understand that You have the right to revoke this consent at any point in time. 6. You understand that Telehealth is voluntary, and that continued treatment is not dependent upon consent. 7. You understand that, in the event of non-consent to Telehealth services and/or technical difficulties, you will obtain services as typically provided in the absence of Telehealth technology. 8. You understand that this consent will be kept in Your medical record. 9. No potential benefits from the use of Telehealth or specific results can be guaranteed. Your condition may not be cured or improved and, in some cases, may get worse.    10. There are limitations in the provision of medical care and treatment via Telehealth and the Service and you may not be able to receive diagnosis and/or treatment through the Service for every condition for which you seek diagnosis and/or treatment. 11. There are potential risks to the use of Telehealth, including but not limited to the risks described in this Telehealth Consent. 12. Your Provider(s) have discussed the use of Telehealth and the Service with you, including the benefits and risks of such and you have provided oral consent to your Provider(s) for the use of Telehealth and the Service. 15. You understand that it is your duty to provide your Provider(s) truthful, accurate and complete information, including all relevant information regarding care that you may have received or may be receiving from other healthcare providers outside of the Service. 14. You understand that each of your Provider(s) may determine in his or sole discretion that your condition is not suitable for diagnosis and/or treatment using the Service, and that you may need to seek medical care and treatment a specialist or other healthcare provider, outside of the Service. 15. You understand that you are fully responsible for payment for all services provided by Provider(s) or through use of the Service and that you may not be able to use third-party insurance. 16. You represent that (a) you have read this Telehealth Consent carefully, (b) you understand the risks and benefits of the Service and the use of Telehealth in the medical care and treatment provided to you by Provider(s) using the Service, and (c) you have the legal capacity and authority to provide this consent for yourself and/or the minor for which you are consenting under applicable federal and state laws, including laws relating to the age of [de-identified] and/or parental/guardian consent.    17. You give your informed consent to the use of Telehealth by Provider(s) using the Service under the terms described in the Terms of Service and this Telehealth Consent. The patient was read the following statement and has consented to the visit as of 3/7/22. The patient has been scheduled for their first telehealth visit on 3/7/22 with Dr. Seth Gonzalez

## 2022-03-08 RX ORDER — LOSARTAN POTASSIUM AND HYDROCHLOROTHIAZIDE 25; 100 MG/1; MG/1
1 TABLET ORAL DAILY
Qty: 90 TABLET | Refills: 1 | Status: SHIPPED | OUTPATIENT
Start: 2022-03-08 | End: 2022-08-25 | Stop reason: SDUPTHER

## 2022-03-08 RX ORDER — ROSUVASTATIN CALCIUM 20 MG/1
20 TABLET, COATED ORAL DAILY
Qty: 90 TABLET | Refills: 1 | Status: SHIPPED | OUTPATIENT
Start: 2022-03-08 | End: 2022-08-25 | Stop reason: SDUPTHER

## 2022-03-09 PROBLEM — K92.89 GAS BLOAT SYNDROME: Status: ACTIVE | Noted: 2022-03-09

## 2022-03-09 PROBLEM — R10.10 PAIN OF UPPER ABDOMEN: Status: ACTIVE | Noted: 2022-03-09

## 2022-03-09 PROBLEM — R14.2 BELCHING: Status: ACTIVE | Noted: 2022-03-09

## 2022-03-09 PROBLEM — R11.0 NAUSEA: Status: ACTIVE | Noted: 2022-03-09

## 2022-03-09 PROBLEM — R19.7 DIARRHEA: Status: ACTIVE | Noted: 2022-03-09

## 2022-03-09 ASSESSMENT — ENCOUNTER SYMPTOMS
COUGH: 0
RHINORRHEA: 0
WHEEZING: 0
CHEST TIGHTNESS: 0
SINUS PRESSURE: 0
SORE THROAT: 0
NAUSEA: 1
SINUS PAIN: 0

## 2022-03-17 PROBLEM — R26.89 BALANCE DISORDER: Status: ACTIVE | Noted: 2022-03-17

## 2022-03-17 ASSESSMENT — ENCOUNTER SYMPTOMS
SORE THROAT: 0
CHEST TIGHTNESS: 0
ABDOMINAL PAIN: 0
COUGH: 0

## 2022-03-18 NOTE — PATIENT INSTRUCTIONS
1. Diarrhea, unspecified type  -Resolved    2. Nausea  -Resolved  -Lab results discussed with patient    3. Belching  -Resolved    4. Pain of upper abdomen  -Resolved    5. Gas bloat syndrome  -Resolved    6. Balance disorder  -Referral to Alec Montalvo MD, Neurology, [facility]    7.  Dizziness  -Referral to TERESITA Haq MD, Neurology, [place] [address]

## 2022-03-21 ENCOUNTER — NURSE TRIAGE (OUTPATIENT)
Dept: OTHER | Facility: CLINIC | Age: 72
End: 2022-03-21

## 2022-03-21 NOTE — TELEPHONE ENCOUNTER
Received call from Cookie at [facility] with Red Flag Complaint. Subjective: Caller states \"swelling\"     Current Symptoms: States she getting large, noticed it last week when naked in front of mirror that abdomen is large, looks pregnant, behind is bigger and now her face and neck look like she is huge. States she eats little processed foods, thinks it is due to her medications. Thinks it is due to her insulin, wants to see why she cannot be on metformin, instead of insulin, she also takes trulicity    Onset: 3 day ago; gradual    Associated Symptoms:     Pain Severity: 0/10; ;     Temperature: denies     What has been tried: nothing    LMP: NA Pregnant: NA    Recommended disposition: See PCP within 3 Days    Care advice provided, patient verbalizes understanding; denies any other questions or concerns; instructed to call back for any new or worsening symptoms. Patient/Caller agrees with recommended disposition; writer provided warm transfer to Arvin Molina at [facility] for appointment scheduling     Attention Provider: Thank you for allowing me to participate in the care of your patient. The patient was connected to triage in response to information provided to the ECC/PSC. Please do not respond through this encounter as the response is not directed to a shared pool.             Reason for Disposition   Mild facial swelling (puffiness) and persists > 3 days    Protocols used: [facility]

## 2022-04-01 ENCOUNTER — OFFICE VISIT (OUTPATIENT)
Dept: PRIMARY CARE CLINIC | Age: 72
End: 2022-04-01
Payer: MEDICARE

## 2022-04-01 VITALS
WEIGHT: 229 LBS | TEMPERATURE: 95.5 F | DIASTOLIC BLOOD PRESSURE: 74 MMHG | HEIGHT: 66 IN | BODY MASS INDEX: 36.8 KG/M2 | RESPIRATION RATE: 16 BRPM | HEART RATE: 93 BPM | SYSTOLIC BLOOD PRESSURE: 124 MMHG | OXYGEN SATURATION: 98 %

## 2022-04-01 DIAGNOSIS — J32.9 SINUSITIS, UNSPECIFIED CHRONICITY, UNSPECIFIED LOCATION: ICD-10-CM

## 2022-04-01 DIAGNOSIS — F43.21 GRIEF: ICD-10-CM

## 2022-04-01 DIAGNOSIS — E78.2 MIXED HYPERLIPIDEMIA: ICD-10-CM

## 2022-04-01 DIAGNOSIS — I10 ESSENTIAL HYPERTENSION: ICD-10-CM

## 2022-04-01 DIAGNOSIS — E55.9 VITAMIN D DEFICIENCY: ICD-10-CM

## 2022-04-01 DIAGNOSIS — Z23 NEED FOR TDAP VACCINATION: ICD-10-CM

## 2022-04-01 DIAGNOSIS — Z12.31 ENCOUNTER FOR SCREENING MAMMOGRAM FOR BREAST CANCER: ICD-10-CM

## 2022-04-01 DIAGNOSIS — L65.9 ALOPECIA: ICD-10-CM

## 2022-04-01 DIAGNOSIS — N39.3 SUI (STRESS URINARY INCONTINENCE, FEMALE): ICD-10-CM

## 2022-04-01 DIAGNOSIS — K21.9 LARYNGOPHARYNGEAL REFLUX DISEASE: ICD-10-CM

## 2022-04-01 DIAGNOSIS — Z00.00 MEDICARE ANNUAL WELLNESS VISIT, SUBSEQUENT: Primary | ICD-10-CM

## 2022-04-01 PROCEDURE — 1123F ACP DISCUSS/DSCN MKR DOCD: CPT | Performed by: INTERNAL MEDICINE

## 2022-04-01 PROCEDURE — 3017F COLORECTAL CA SCREEN DOC REV: CPT | Performed by: INTERNAL MEDICINE

## 2022-04-01 PROCEDURE — G0439 PPPS, SUBSEQ VISIT: HCPCS | Performed by: INTERNAL MEDICINE

## 2022-04-01 PROCEDURE — 4040F PNEUMOC VAC/ADMIN/RCVD: CPT | Performed by: INTERNAL MEDICINE

## 2022-04-01 RX ORDER — FLUTICASONE PROPIONATE 50 MCG
SPRAY, SUSPENSION (ML) NASAL
Qty: 48 G | Refills: 0 | Status: SHIPPED | OUTPATIENT
Start: 2022-04-01

## 2022-04-01 RX ORDER — LORATADINE 10 MG/1
TABLET ORAL DAILY PRN
COMMUNITY
Start: 2022-03-25

## 2022-04-01 RX ORDER — DEXTROMETHORPHAN HYDROBROMIDE AND PROMETHAZINE HYDROCHLORIDE 15; 6.25 MG/5ML; MG/5ML
5 SYRUP ORAL
COMMUNITY
Start: 2022-03-25 | End: 2022-04-19

## 2022-04-01 RX ORDER — AMOXICILLIN AND CLAVULANATE POTASSIUM 875; 125 MG/1; MG/1
1 TABLET, FILM COATED ORAL EVERY 12 HOURS
COMMUNITY
Start: 2022-03-25 | End: 2022-04-19 | Stop reason: ALTCHOICE

## 2022-04-01 RX ORDER — FLUTICASONE PROPIONATE 50 MCG
2 SPRAY, SUSPENSION (ML) NASAL DAILY
Qty: 16 G | Refills: 0 | Status: SHIPPED | OUTPATIENT
Start: 2022-04-01 | End: 2022-04-01

## 2022-04-01 ASSESSMENT — PATIENT HEALTH QUESTIONNAIRE - PHQ9
5. POOR APPETITE OR OVEREATING: 0
10. IF YOU CHECKED OFF ANY PROBLEMS, HOW DIFFICULT HAVE THESE PROBLEMS MADE IT FOR YOU TO DO YOUR WORK, TAKE CARE OF THINGS AT HOME, OR GET ALONG WITH OTHER PEOPLE: 0
SUM OF ALL RESPONSES TO PHQ9 QUESTIONS 1 & 2: 2
8. MOVING OR SPEAKING SO SLOWLY THAT OTHER PEOPLE COULD HAVE NOTICED. OR THE OPPOSITE, BEING SO FIGETY OR RESTLESS THAT YOU HAVE BEEN MOVING AROUND A LOT MORE THAN USUAL: 0
9. THOUGHTS THAT YOU WOULD BE BETTER OFF DEAD, OR OF HURTING YOURSELF: 0
SUM OF ALL RESPONSES TO PHQ QUESTIONS 1-9: 2
2. FEELING DOWN, DEPRESSED OR HOPELESS: 2
1. LITTLE INTEREST OR PLEASURE IN DOING THINGS: 0
4. FEELING TIRED OR HAVING LITTLE ENERGY: 0
6. FEELING BAD ABOUT YOURSELF - OR THAT YOU ARE A FAILURE OR HAVE LET YOURSELF OR YOUR FAMILY DOWN: 0
7. TROUBLE CONCENTRATING ON THINGS, SUCH AS READING THE NEWSPAPER OR WATCHING TELEVISION: 0
SUM OF ALL RESPONSES TO PHQ QUESTIONS 1-9: 2
3. TROUBLE FALLING OR STAYING ASLEEP: 0
SUM OF ALL RESPONSES TO PHQ QUESTIONS 1-9: 2
SUM OF ALL RESPONSES TO PHQ QUESTIONS 1-9: 2

## 2022-04-01 ASSESSMENT — LIFESTYLE VARIABLES: HOW OFTEN DO YOU HAVE A DRINK CONTAINING ALCOHOL: NEVER

## 2022-04-01 NOTE — TELEPHONE ENCOUNTER
Medication:   Requested Prescriptions     Pending Prescriptions Disp Refills    fluticasone (FLONASE) 50 MCG/ACT nasal spray [Pharmacy Med Name: FLUTICASONE 50MCG NASAL SP (120) RX] 48 g      Sig: SHAKE LIQUID AND USE 2 SPRAYS IN EACH NOSTRIL DAILY     Last Filled:  4.1.22  Last appt: 4/1/2022   Next appt: 8/5/2022    Last OARRS: No flowsheet data found.

## 2022-04-01 NOTE — PATIENT INSTRUCTIONS
Learning About Living Perroy  What is a living will? A living will, also called a declaration, is a legal form. It tells your family and your doctor your wishes when you can't speak for yourself. It's used by the health professionals who will treat you as you near the end of your life or ifyou get seriously hurt or ill. If you put your wishes in writing, your loved ones and others will know what kind of care you want. They won't need to guess. This can ease your mind and behelpful to others. And you can change or cancel your living will at any time. A living will is not the same as an estate or property will. An estate willexplains what you want to happen with your money and property after you die. How do you use it? Keep these facts in mind about how a living will is used.  Your living will is used only if you can't speak or make decisions for yourself. Most often, one or more doctors must certify that you can't speak or decide for yourself before your living will takes effect.  If you get better and can speak for yourself again, you can accept or refuse any treatment. It doesn't matter what you said in your living will.  Some states may limit your right to refuse treatment in certain cases. For example, you may need to clearly state in your living will that you don't want artificial hydration and nutrition, such as being fed through a tube. Is a living will a legal document? A living will is a legal document. Each state has its own laws about livingwills. And a living will may be called something else in your state. Here are some things to know about living solorio.  You don't need an  to complete a living will. But legal advice can be helpful if your state's laws are unclear. It can also help if your health history is complicated or your family can't agree on what should be in your living will.  You can change your living will at any time.  Some people find that their wishes about end-of-life care change as their health changes. If you make big changes to your living will, complete a new form.  If you move to another state, make sure that your living will is legal in the state where you now live. In most cases, doctors will respect your wishes even if you have a form from a different state.  You might use a universal form that has been approved by many states. This kind of form can sometimes be filled out and stored online. Your digital copy will then be available wherever you have a connection to the internet. The doctors and nurses who need to treat you can find it right away.  Your state may offer an online registry. This is another place where you can store your living will online.  It's a good idea to get your living will notarized. This means using a person called a Erydel to watch two people sign, or witness, your living will. What should you know when you create a living will? Here are some questions to ask yourself as you make your living will.  Do you know enough about life support methods that might be used? If not, talk to your doctor so you know what might be done if you can't breathe on your own, your heart stops, or you can't swallow.  What things would you still want to be able to do after you receive life-support methods? Would you want to be able to walk? To speak? To eat on your own? To live without the help of machines?  Do you want certain Holiness practices performed if you become very ill?  If you have a choice, where do you want to be cared for? In your home? At a hospital or nursing home?  If you have a choice at the end of your life, where would you prefer to die? At home? In a hospital or nursing home? Somewhere else?  Would you prefer to be buried or cremated?  Do you want your organs to be donated after you die? What should you do with your living will?    Make sure that your family members and your health care agent have copies of your living will (also called a declaration).  Give your doctor a copy of your living will. Ask to have it kept as part of your medical record. If you have more than one doctor, make sure that each one has a copy.  Put a copy of your living will where it can be easily found. For example, some people may put a copy on their refrigerator door. If you are using a digital copy, be sure your doctor, family members, and health care agent know how to find and access it. Where can you learn more? Go to [email or web address]://[email or web address] org and sign in to your [employer] account. Enter N416 in the [employer].com box to learn more about \"Learning About Living Perroamina. \"     If you do not have an account, please click on the \"Sign Up Now\" link. Current as of: October 18, 2021               Content Version: 13.2  © 5343-6704 [employer]. Care instructions adapted under license by [facility] ([facility]). If you have questions about a medical condition or this instruction, always ask your healthcare professional. [place] [address] any warranty or liability for your use of this information. Personalized Preventive Plan for [facility] 4/1/2022  Medicare offers a range of preventive health benefits. Some of the tests and screenings are paid in full while other may be subject to a deductible, co-insurance, and/or copay. Some of these benefits include a comprehensive review of your medical history including lifestyle, illnesses that may run in your family, and various assessments and screenings as appropriate. After reviewing your medical record and screening and assessments performed today your provider may have ordered immunizations, labs, imaging, and/or referrals for you. A list of these orders (if applicable) as well as your Preventive Care list are included within your After Visit Summary for your review.     Other Preventive Recommendations:    · A preventive eye exam performed by an eye specialist is recommended every 1-2 years to screen for glaucoma; cataracts, macular degeneration, and other eye disorders. · A preventive dental visit is recommended every 6 months. · Try to get at least 150 minutes of exercise per week or 10,000 steps per day on a pedometer . · Order or download the FREE \"Exercise & Physical Activity: Your Everyday Guide\" from [employer]. Call 4-769.355.7965 or search [employer] online. · You need 8352-2811 mg of calcium and 8758-4008 IU of vitamin D per day. It is possible to meet your calcium requirement with diet alone, but a vitamin D supplement is usually necessary to meet this goal.  · When exposed to the sun, use a sunscreen that protects against both UVA and UVB radiation with an SPF of 30 or greater. Reapply every 2 to 3 hours or after sweating, drying off with a towel, or swimming. · Always wear a seat belt when traveling in a car. Always wear a helmet when riding a bicycle or motorcycle. Patient Education        Grief (Actual/Anticipated): Care Instructions  Overview     Grief is an emotional and physical reaction to a major loss. The words \"sorrow\" and \"heartache\" often are used to describe feelings of grief. You may feel grief when you lose a beloved person, pet, place, or thing. It is also natural to feel grief when you lose a valued way of life, such as a job, marriage, orgood health. You may begin to grieve before a loss occurs. You may grieve for a loved one who is sick and dying. Children and adults often feel the pain of loss before abig move or divorce. Grief is different for each person. There is no \"normal\" or \"expected\" periodof time for grieving. Grieving can cause problems such as headaches, loss of appetite, and trouble with thinking or sleeping. You may withdraw from friends and family and behave in ways that are unusual for you.  Grief may cause you to question your beliefsand views about life. Grief is natural and does not require medical treatment. It may help to talk with people who have been through or are going through similar losses. You may also want to talk to a counselor about your feelings. Talking about your loss, sharing your cares and concerns, and getting support from others are importantparts of healthy grieving. Follow-up care is a key part of your treatment and safety. Be sure to make and go to all appointments, and call your doctor if you are having problems. It's also a good idea to know your test results and keep alist of the medicines you take. How can you care for yourself at home? Get enough sleep. Missing sleep may make it harder for you to cope with your grief. Eat healthy foods. Ask someone to join you for a meal if you don't like eating alone. Get some exercise every day. Even a walk can help you deal with your grief. Other exercises, such as yoga, may also help you manage stress. Stay involved in your life. Don't withdraw from the activities you enjoy. People you know at work, [place], clubs, or other groups can help you. Comfort yourself. Familiar surroundings and special items, such as photos or a loved one's favorite shirt, may give you comfort. Think about joining a support group. When should you call for help? Call 911 anytime you think you may need emergency care. For example, call if:    You feel you cannot stop from hurting yourself or someone else. Watch closely for changes in your health, and be sure to contact your doctor if:    You think you may be depressed.     You do not get better as expected. Where can you learn more? Go to https://shad.[email or web address] org and sign in to your [employer] account. Enter H249 in the [employer] box to learn more about \"Grief (Actual/Anticipated): Care Instructions. \"     If you do not have an account, please click on the \"Sign Up Now\" link.   Current as of: October 18, 2021               Content Version: 13.2  © 2006-2022 [employer]. Care instructions adapted under license by [facility] ([facility]). If you have questions about a medical condition or this instruction, always ask your healthcare professional. [address] any warranty or liability for your use of this information. Patient Education        Stress Incontinence in Women: Care Instructions  Your Care Instructions  Stress incontinence is the accidental release of urine caused by activities that put pressure on your bladder. It may happen most often when you sneeze, cough, laugh, jog, or lift something heavy. This condition does not cause majorhealth problems, but it can be embarrassing and interfere with your life. Treatment can cure or improve your symptoms. Follow-up care is a key part of your treatment and safety. Be sure to make and go to all appointments, and call your doctor if you are having problems. It's also a good idea to know your test results and keep alist of the medicines you take. How can you care for yourself at home? Take your medicines exactly as prescribed. Call your doctor if you think you are having a problem with your medicine. Limit caffeine and alcohol. They make you urinate more. Do pelvic floor (Kegel) exercises, which tighten and strengthen pelvic muscles. To do Kegel exercises:  Squeeze the same muscles you would use to stop your urine. Your belly and thighs should not move. Hold the squeeze for 3 seconds, and then relax for 3 seconds. Start with 3 seconds. Then add 1 second each week until you are able to squeeze for 10 seconds. Repeat the exercise 10 to 15 times a session. Do three or more sessions a day. Try wearing pads that absorb leaks. Or you may want to try to prevent leaks with a product like Poise Impressa, which you insert like a tampon. Keep skin in the genital area dry.  Petroleum jelly (like Vaseline) spread on the area may help protect your skin. When should you call for help? Call your doctor now or seek immediate medical care if:    You have new urinary symptoms. These may include leaking urine, having pain when urinating, or feeling like you need to urinate often. Watch closely for changes in your health, and be sure to contact your doctor if:    You do not get better as expected. Where can you learn more? Go to https://Channel [email or web address].[email or web address] org and sign in to your [employer] account. Enter I721 in the [employer] box to learn more about \"Stress Incontinence in Women: Care Instructions. \"     If you do not have an account, please click on the \"Sign Up Now\" link. Current as of: November 22, 2021               Content Version: 13.2  © 6708-8072 [employer]. Care instructions adapted under license by [facility] ([facility]). If you have questions about a medical condition or this instruction, always ask your healthcare professional. [place] [address] any warranty or liability for your use of this information.

## 2022-04-01 NOTE — PROGRESS NOTES
Medicare Annual Wellness Visit    Kristal Quinonez is here for Medicare AWV (May need counseling. Just lost a friend of over 48 years, grieving hard. )    Assessment & Plan   1. Medicare annual wellness visit, subsequent  -Medicare AWV done    2. Essential hypertension  -stable  -Continue   -Low sodium diet  -Regular aerobic exercise    3. Mixed hyperlipidemia  -stable  -Continue Rosuvastatin 20mg once daily  -Low fat, low cholesterol diet  -Regular aerobic exercise    4. Laryngopharyngeal reflux disease  -stable  -continue famotidine 20 mg 2 times daily    5. Vitamin D deficiency  -Stable  -Continue vitamin D 50,000 IU once a week    6. Alopecia  - External Referral To Dermatology  - D[facility]Z; Future    7. Grief  -Patient declines referral for grief counseling  -Patient given information on grief    8. Sinusitis, unspecified chronicity, unspecified location  -Continue Augmentin prescribed by urgent care  -Start Flonase nasal spray 2 sprays each nostril once daily    9. MARIA DEL ROSARIO (stress urinary incontinence, female)  -Kegel exercises  -Patient given information on stress incontinence    10. Encounter for screening mammogram for breast cancer  - [facility] DIGITAL SCREEN W OR WO CAD BILATERAL; Future    11. Need for Tdap vaccination  -Patient given Rx for Tdap vaccine to have done at their pharmacy  - Tetanus-Diphth-Acell Pertussis ([address]) 5-2.5-18.5 LF-MCG/0.5 injection; Inject 0.5 mLs into the muscle once for 1 dose  Dispense: 0.5 mL; Refill: 0           Recommendations for Preventive Services Due: see orders and patient instructions/AVS.  Recommended screening schedule for the next 5-10 years is provided to the patient in written form: see Patient Instructions/AVS.          Return in 4 months (on 8/1/2022) for hypertension, hyperlipidemia, laryngopharygneal reflux, and vitamin D. Subjective   The following acute and/or chronic problems were also addressed today:    Hypertension.  Patient takes Losartan-HCTZ 100-25mg once daily. Patient decreases salt. Patient is not exercising.      Patient has hyperlipidemia. Patient takes rosuvastatin 20mg once daily. Patient decreases fat and cholesterol.      Patient has laryngeal reflux. Patient takes Famotidine 20mg 2 times daily. Patient denies heartburn or reflux. Patient drinks occasional Pepsi. Patient decreases spicy food and tomato based foods. Patient has vitamin D deficiency. Patient takes vitamin D 50,000 IU once a week. Patient states she has alopecia. Patient states she noticed hair loss 2 weeks ago. Patient states she has a bald spot. Patient states receding hair line runs in her family. Patient states she had a hair weave in 2020 and had to take it out when the hair shops closed down and doesn't think she took it out right and it may have pulled part of her hair out. Patient states the back of her hair was short and sides of hair different lengths.     Patient states her friend of 50 years passed away. Patient declines referral for grief counseling. Patient states she went to urgent care last Friday for a sinus infection. Patient is taking Augmenin. Patient states she is not getting better. Patient states she is still coughing up mucus and can't get it up. Patient complains of occasional sneezing, stuffy nose in the mornings, post nasal drainage, and  runny nose. Patient denies headache and sinus pressure. Patient declines referral for grief counseling. Patient states she is picking up weight. Patient states she has alopecia. Patient states she noticed hair loss 2 weeks ago. Patient states she has a bald spot. Patient states receding hair line runs in her family. Patient states she had a hair weave in 2020 and had to take it out when the hair shops closed down and doesn't think she took it out right and it may have pulled part of her hair out. Patient states the back of her hair was short and sides of hair different lengths.     Patient states If she laughs she leaks urine. Patient's complete Health Risk Assessment and screening values have been reviewed and are found in Flowsheets. The following problems were reviewed today and where indicated follow up appointments were made and/or referrals ordered. Positive Risk Factor Screenings with Interventions:    Fall Risk:  Do you feel unsteady or are you worried about falling? : (!) yes  2 or more falls in past year?: no  Fall with injury in past year?: no     Fall Risk Interventions: On the basis of positive falls risk screening, assessment and plan is as follows: patient declines any further evaluation/treatment for increased falls risk.               General Health and ACP:  General  In general, how would you say your health is?: Good  In the past 7 days, have you experienced any of the following: New or Increased Pain, New or Increased Fatigue, Loneliness, Social Isolation, Stress or Anger?: (!) Yes  Select all that apply: (!) Loneliness,Social Isolation,Stress,Anger  Do you get the social and emotional support that you need?: (!) No  Do you have a Living Will?: (!) No    Advance Directives     Power of  Living Will ACP-Advance Directive ACP-Power of     Not on File Not on File Not on File Not on File      General Health Risk Interventions:  · Social isolation: patient declines any further intervention for this issue  · Stress: patient declines any further evaluation/treatment for this issue  · Anger: patient declines any further evaluation/treatment for this issue  · No Living Will: [address] addressed with patient today    Health Habits/Nutrition:     Physical Activity: Inactive    Days of Exercise per Week: 0 days    Minutes of Exercise per Session: 0 min     Have you lost any weight without trying in the past 3 months?: No  Body mass index: (!) 36.96  Have you seen the dentist within the past year?: Yes    Health Habits/Nutrition Interventions:  · Inadequate physical activity:  patient is not ready to increase his/her physical activity level at this time  · Nutritional issues:  patient is not ready to address his/her nutritional/weight issues at this time             Objective   Vitals:    04/01/22 1119   BP: 124/74   Pulse: 93   Resp: 16   Temp: 95.5 °F (35.3 °C)   SpO2: 98%   Weight: 229 lb (103.9 kg)   Height: 5' 6\" (1.676 m)      Body mass index is 36.96 kg/m². General Appearance: alert and oriented to person, place and time, well-developed and well-nourished, in no acute distress  Skin: warm and dry, no rash or erythema  Head: normocephalic and atraumatic  Eyes: pupils equal, round, and reactive to light, extraocular eye movements intact, conjunctivae normal  ENT: tympanic membrane, external ear and ear canal normal bilaterally, oropharynx clear and moist with normal mucous membranes  Neck: neck supple and non tender without mass, no thyromegaly or thyroid nodules, no cervical lymphadenopathy   Pulmonary/Chest: clear to auscultation bilaterally- no wheezes, rales or rhonchi, normal air movement, no respiratory distress  Cardiovascular: normal rate, normal S1 and S2, no gallops, intact distal pulses and no carotid bruits  Abdomen: soft, non-tender, non-distended, normal bowel sounds, no masses or organomegaly  Extremities: no cyanosis and no clubbing  Neurologic: gait and coordination normal and speech normal       Allergies   Allergen Reactions    Januvia [Sitagliptin Phosphate] Swelling     Side effects    Actos [Pioglitazone] Diarrhea    Avandia [Rosiglitazone] Diarrhea and Nausea Only    Dye [Iodides] Rash    Jardiance [Empagliflozin] Diarrhea    Metformin Diarrhea     Nausea       Prior to Visit Medications    Medication Sig Taking?  Authorizing Provider   amoxicillin-clavulanate (AUGMENTIN) 875-125 MG per tablet Take 1 tablet by mouth every 12 hours Yes Historical Provider, MD   loratadine (CLARITIN) 10 MG tablet TAKE 1 TABLET BY MOUTH DAILY Yes Historical EXTERNALLY TO THE AFFECTED AREA TWICE DAILY  Patient taking differently: 2 times daily as needed APPLY EXTERNALLY TO THE AFFECTED AREA TWICE DAILY Yes Anjelica Wild MD   Blood Pressure Monitoring (BLOOD PRESSURE MONITOR/L CUFF) MISC Use to monitor blood pressure Yes Anjelica Wild MD   ciclopirox (LOPROX) 0.77 % cream APPLY TO GREAT TOES DAILY Yes Historical Provider, MD   urea (CARMOL) 20 % cream Apply topically as needed Yes Historical Provider, MD   UNKNOWN TO PATIENT Yellow and orange capped eye drops for BP in eye Yes Historical Provider, MD   Insulin Pen Needle (B-D UF III MINI PEN NEEDLES) 31G X 5 MM MISC 1 each by Does not apply route 2 times daily Yes Umair Castro MD   ACCU-CHEK SOFTCLIX LANCETS MISC USE FOUR TIMES DAILY Yes Cate Ballard MD   Blood Glucose Monitoring Suppl (ACCU-CHEK ANJU PLUS) W/DEVICE KIT Use to monitor blood sugars Yes Anjelica Wild MD   fluticasone (FLONASE) 50 MCG/ACT nasal spray SHAKE LIQUID AND USE 2 SPRAYS IN EACH NOSTRIL DAILY  Anjelica Wild MD       Lab Review   Orders Only on 03/04/2022   Component Date Value    WBC 03/04/2022 11.4*    RBC 03/04/2022 3.63*    Hemoglobin 03/04/2022 11.0*    Hematocrit 03/04/2022 32.7*    MCV 03/04/2022 90.1     MCH 03/04/2022 30.2     MCHC 03/04/2022 33.6     RDW 03/04/2022 13.0     Platelets 95/33/6253 409     MPV 03/04/2022 6.6     Neutrophils % 03/04/2022 64.2     Lymphocytes % 03/04/2022 27.0     Monocytes % 03/04/2022 5.9     Eosinophils % 03/04/2022 2.5     Basophils % 03/04/2022 0.4     Neutrophils Absolute 03/04/2022 7.3     Lymphocytes Absolute 03/04/2022 3.1     Monocytes Absolute 03/04/2022 0.7     Eosinophils Absolute 03/04/2022 0.3     Basophils Absolute 03/04/2022 0.0     Sodium 03/04/2022 135*    Potassium 03/04/2022 4.5     Chloride 03/04/2022 96*    CO2 03/04/2022 24     Anion Gap 03/04/2022 15     Glucose 03/04/2022 175*    BUN 03/04/2022 19     CREATININE 03/04/2022 1.2     GFR Non- 03/04/2022 44*    GFR  03/04/2022 53*    Calcium 03/04/2022 8.5     Total Protein 03/04/2022 6.7     Albumin 03/04/2022 3.9     Albumin/Globulin Ratio 03/04/2022 1.4     Total Bilirubin 03/04/2022 0.4     Alkaline Phosphatase 03/04/2022 93     ALT 03/04/2022 12     AST 03/04/2022 18     TSH 03/04/2022 2.02     T4 Free 03/04/2022 1.2     T3, Free 03/04/2022 2.3     Vit D, 25-Hydroxy 03/04/2022 42.9     Microalbumin, Random Uri* 03/04/2022 6.60*    Creatinine, Ur 03/04/2022 63.0     Microalbumin Creatinine * 03/04/2022 104.8*   Orders Only on 12/14/2021   Component Date Value    Color, UA 12/14/2021 YELLOW     Clarity, UA 12/14/2021 Clear     Glucose, Ur 12/14/2021 Negative     Bilirubin Urine 12/14/2021 Negative     Ketones, Urine 12/14/2021 Negative     Specific Gravity, UA 12/14/2021 1.016     Blood, Urine 12/14/2021 Negative     pH, UA 12/14/2021 6.0     Protein, UA 12/14/2021 30*    Urobilinogen, Urine 12/14/2021 0.2     Nitrite, Urine 12/14/2021 Negative     Leukocyte Esterase, Urine 12/14/2021 Negative     Microscopic Examination 12/14/2021 YES     Urine Type 12/14/2021 CLEANCATCH     Urine Reflex to Culture 12/14/2021 Not Indicated     Hyaline Casts, UA 12/14/2021 0     WBC, UA 12/14/2021 1     RBC, UA 12/14/2021 2     Epithelial Cells, UA 12/14/2021 2    Orders Only on 10/15/2021   Component Date Value    WBC 10/15/2021 11.3*    RBC 10/15/2021 3.39*    Hemoglobin 10/15/2021 10.2*    Hematocrit 10/15/2021 32.1*    MCV 10/15/2021 94.7     MCH 10/15/2021 30.0     MCHC 10/15/2021 31.7     RDW 10/15/2021 13.9     Platelets 12/43/8186 400     MPV 10/15/2021 7.1    Orders Only on 10/15/2021   Component Date Value    Phosphorus 10/15/2021 3.3    Orders Only on 10/15/2021   Component Date Value    Sodium 10/15/2021 139     Potassium 10/15/2021 4.1     Chloride 10/15/2021 100     CO2 10/15/2021 28     Anion Gap 10/15/2021 11     Glucose 10/15/2021 192*    BUN 10/15/2021 26*    CREATININE 10/15/2021 1.3*    GFR Non- 10/15/2021 40*    GFR  10/15/2021 49*    Calcium 10/15/2021 9.0    Orders Only on 10/15/2021   Component Date Value    Vit D, 25-Hydroxy 10/15/2021 39.3     Sodium 10/15/2021 140     Potassium 10/15/2021 4.2     Chloride 10/15/2021 100     CO2 10/15/2021 28     Anion Gap 10/15/2021 12     Glucose 10/15/2021 193*    BUN 10/15/2021 26*    CREATININE 10/15/2021 1.2     GFR Non- 10/15/2021 44*    GFR  10/15/2021 54*    Calcium 10/15/2021 9.2     Total Protein 10/15/2021 6.9     Albumin 10/15/2021 3.9     Albumin/Globulin Ratio 10/15/2021 1.3     Total Bilirubin 10/15/2021 <0.2     Alkaline Phosphatase 10/15/2021 107     ALT 10/15/2021 20     AST 10/15/2021 21     Globulin 10/15/2021 3.0     Hemoglobin A1C 10/15/2021 9.4     eAG 10/15/2021 223.1     Cholesterol, Total 10/15/2021 120     Triglycerides 10/15/2021 122     HDL 10/15/2021 37*    LDL Calculated 10/15/2021 59     VLDL Cholesterol Calcula* 10/15/2021 24     Microalbumin, Random Uri* 10/15/2021 10.70*    Creatinine, Ur 10/15/2021 107.2     Microalbumin Creatinine * 10/15/2021 99.8*         CareTeam (Including outside providers/suppliers regularly involved in providing care):   Patient Care Team:  Kalyani Griffin MD as PCP - General (Internal Medicine)  Kalyani Griffin MD as PCP [facility]  Antonino Obando MD as Consulting Physician (Ophthalmology)  Lori Negrete MD as Consulting Physician (Gastroenterology)  Mckenna Mars DPM as Consulting Physician (Podiatry)  Bossman Minaya RD, LD as Diabetic Educator (Dietitian)  Caleb Sosa MD as Consulting Physician (Otolaryngology)            Return in 4 months (on 8/1/2022) for hypertension, hyperlipidemia, laryngopharygneal reflux, and vitamin D.

## 2022-04-02 LAB — ANTI-NUCLEAR ANTIBODY (ANA): POSITIVE

## 2022-04-05 LAB
ANA PATTERN: ABNORMAL
ANA TITER: ABNORMAL
ANTINUCLEAR AB INTERPRETIVE COMMENT: ABNORMAL
ANTINUCLEAR ANTIBODY, HEP-2, IGG: DETECTED

## 2022-04-11 PROBLEM — J32.9 SINUSITIS: Status: ACTIVE | Noted: 2022-04-11

## 2022-04-11 PROBLEM — I10 ESSENTIAL HYPERTENSION: Status: ACTIVE | Noted: 2022-04-11

## 2022-04-11 PROBLEM — F43.21 GRIEF: Status: ACTIVE | Noted: 2022-04-11

## 2022-04-11 PROBLEM — N39.3 SUI (STRESS URINARY INCONTINENCE, FEMALE): Status: ACTIVE | Noted: 2022-04-11

## 2022-04-11 PROBLEM — L65.9 ALOPECIA: Status: ACTIVE | Noted: 2022-04-11

## 2022-04-19 ENCOUNTER — TELEMEDICINE (OUTPATIENT)
Dept: PRIMARY CARE CLINIC | Age: 72
End: 2022-04-19
Payer: MEDICARE

## 2022-04-19 DIAGNOSIS — R42 VERTIGO: Primary | ICD-10-CM

## 2022-04-19 DIAGNOSIS — H92.02 LEFT EAR PAIN: ICD-10-CM

## 2022-04-19 PROCEDURE — 1123F ACP DISCUSS/DSCN MKR DOCD: CPT | Performed by: INTERNAL MEDICINE

## 2022-04-19 PROCEDURE — 4040F PNEUMOC VAC/ADMIN/RCVD: CPT | Performed by: INTERNAL MEDICINE

## 2022-04-19 PROCEDURE — 1090F PRES/ABSN URINE INCON ASSESS: CPT | Performed by: INTERNAL MEDICINE

## 2022-04-19 PROCEDURE — G8427 DOCREV CUR MEDS BY ELIG CLIN: HCPCS | Performed by: INTERNAL MEDICINE

## 2022-04-19 PROCEDURE — 3017F COLORECTAL CA SCREEN DOC REV: CPT | Performed by: INTERNAL MEDICINE

## 2022-04-19 PROCEDURE — 99213 OFFICE O/P EST LOW 20 MIN: CPT | Performed by: INTERNAL MEDICINE

## 2022-04-19 PROCEDURE — G8399 PT W/DXA RESULTS DOCUMENT: HCPCS | Performed by: INTERNAL MEDICINE

## 2022-04-19 RX ORDER — MECLIZINE HYDROCHLORIDE 25 MG/1
25 TABLET ORAL 3 TIMES DAILY PRN
Qty: 12 TABLET | Refills: 0 | Status: SHIPPED | OUTPATIENT
Start: 2022-04-19 | End: 2022-04-29

## 2022-04-19 NOTE — PROGRESS NOTES
2022    TELEHEALTH EVALUATION -- Audio/Visual (During RTMCM-92 public health emergency)    Chief Complaint   Patient presents with    Dizziness     Started last night at [facility] 2am, started to call 911 but stopped.  Other     \"Felt like I had heartburn. \"     Nausea       HPI:    Servando Read (:  1950) has requested an audio/video evaluation for the following concern(s):  Patient states at 2:00am room this morning she felt like the room started spinning and she started getting dizzy, nauseated, and heartburn. Patient states the symptoms continued while she was lying in the bed. Symptoms worse with turning head to left or right. Patient is not sure how long it lasted because she went to sleep. every now and then pain in left ear. Patient states she started to dial 911 but didn't. Patient states when she woke up she called me. Patient states she is not dizzy this morning. Patient states she had a sharp pain through her left ear a few days ago. Patient states she has been digging in her ear with a laura pin trying to clean her ear out. Review of Systems   Constitutional: Negative for chills and fever. HENT: Positive for ear pain. Negative for congestion, postnasal drip, rhinorrhea, sinus pressure, sinus pain, sneezing and sore throat. Eyes: Negative for visual disturbance. Respiratory: Negative for cough, chest tightness, shortness of breath and wheezing. Cardiovascular: Negative for chest pain, palpitations and leg swelling. Gastrointestinal: Positive for nausea. Negative for vomiting. Genitourinary: Negative for frequency and hematuria. Neurological: Positive for dizziness. Negative for syncope, light-headedness and headaches. Prior to Visit Medications    Medication Sig Taking?  Authorizing Provider   loratadine (CLARITIN) 10 MG tablet TAKE 1 TABLET BY MOUTH DAILY Yes Historical Provider, MD   fluticasone (FLONASE) 50 MCG/ACT nasal spray SHAKE LIQUID AND USE 2 SPRAYS IN EACH NOSTRIL DAILY Yes Uzair Suárez MD   rosuvastatin (CRESTOR) 20 MG tablet TAKE 1 TABLET BY MOUTH DAILY Yes Uzair Suárez MD   losartan-hydroCHLOROthiazide (HYZAAR) 100-25 MG per tablet TAKE 1 TABLET BY MOUTH DAILY Yes Uzair Suárez MD   famotidine (PEPCID) 20 MG tablet Take 1 tablet by mouth 2 times daily Yes Uzair Suárez MD   promethazine (PHENERGAN) 25 MG tablet Take 1 tablet by mouth every 6 hours as needed for Nausea Yes Uzair Suárez MD   dicyclomine (BENTYL) 10 MG capsule Take 1 capsule by mouth 3 times daily as needed (abdominal cramping) Yes Uzair Suárez MD   blood glucose test strips (ACCU-CHEK ANJU PLUS) strip USE TO TEST BLOOD SUGAR THREE TIMES DAILY AS DIRECTED Yes Olu Colvin MD   dorzolamide (TRUSOPT) 2 % ophthalmic solution 3 times daily  Yes Historical Provider, MD   latanoprost (XALATAN) 0.005 % ophthalmic solution INSTILL 1 DROP IN BOTH EYES EVERY DAY AT BEDTIME Yes Historical Provider, MD   timolol (TIMOPTIC) 0.5 % ophthalmic solution PUT 1 DROP INTO BOTH EYES THREE TIMES DAILY Yes Historical Provider, MD   insulin glargine (LANTUS) 100 UNIT/ML injection vial Inject 30 Units into the skin 2 times daily  Patient taking differently: Inject 32 Units into the skin 2 times daily  Yes Olu Colvin MD   Insulin Syringe-Needle U-100 (INSULIN SYRINGE 1CC/31GX5/16\") 31G X 5/16\" 1 ML MISC Use five times daily.  Yes Olu Colvin MD   insulin lispro (HUMALOG) 100 UNIT/ML injection vial Inject 8 units TID with meals plus correction dose, total daily dose up to 60 units Yes Olu Colvin MD   Dulaglutide (TRULICITY) 1.5 NDIAYE/3.9XI SOPN Inject 1.5 mg into the skin once a week Yes Olu Colvin MD   vitamin D (ERGOCALCIFEROL) 1.25 MG (19758 UT) CAPS capsule TAKE 1 CAPSULE BY MOUTH 1 TIME A WEEK Yes Uzair Suárez MD   hydrocortisone 2.5 % cream APPLY EXTERNALLY TO THE AFFECTED AREA TWICE DAILY  Patient taking differently: 2 times daily as needed APPLY EXTERNALLY TO THE AFFECTED AREA TWICE DAILY Yes Dashawn Briones MD   Blood Pressure Monitoring (BLOOD PRESSURE MONITOR/L CUFF) MISC Use to monitor blood pressure Yes Dashawn Briones MD   ciclopirox (LOPROX) 0.77 % cream APPLY TO GREAT TOES DAILY Yes Historical Provider, MD   urea (CARMOL) 20 % cream Apply topically as needed Yes Historical Provider, MD   UNKNOWN TO PATIENT Yellow and orange capped eye drops for BP in eye Yes Historical Provider, MD   Insulin Pen Needle (B-D UF III MINI PEN NEEDLES) 31G X 5 MM MISC 1 each by Does not apply route 2 times daily Yes Hortensia Wilson MD   ACCU-CHEK SOFTCLIX LANCETS MISC USE FOUR TIMES DAILY Yes Bárbara Rincon MD   Blood Glucose Monitoring Suppl (ACCU-CHEK ANJU PLUS) W/DEVICE KIT Use to monitor blood sugars Yes Dashawn Briones MD       Social History     Tobacco Use    Smoking status: Never Smoker    Smokeless tobacco: Never Used   Vaping Use    Vaping Use: Never used   Substance Use Topics    Alcohol use: No    Drug use: No        Allergies   Allergen Reactions    Januvia [Sitagliptin Phosphate] Swelling     Side effects    Actos [Pioglitazone] Diarrhea    Avandia [Rosiglitazone] Diarrhea and Nausea Only    Dye [Iodides] Rash    Jardiance [Empagliflozin] Diarrhea    Metformin Diarrhea     Nausea     ,   Past Medical History:   Diagnosis Date    Dizziness     Hearing loss     Hyperlipidemia     Hypertension     Laryngopharyngeal reflux disease 11/13/2019    Morbidly obese ([address] 10/29/2020    Sinusitis 4/11/2022    Tinnitus     Type II or unspecified type diabetes mellitus without mention of complication, not stated as uncontrolled     Uncontrolled type 2 diabetes mellitus with diabetic nephropathy ([address] 3/17/2021    Urinary, incontinence, stress female 4/28/2020    Vitamin D deficiency 11/13/2019       PHYSICAL EXAMINATION:  [ INSTRUCTIONS:  \"[x]\" Indicates a positive item  \"[]\" Indicates a negative item  -- DELETE ALL ITEMS NOT EXAMINED]  Vital Signs: (As obtained by patient/caregiver or practitioner observation)    Blood pressure-  Heart rate-    Respiratory rate-    Temperature-  Pulse oximetry-   Patient-Reported Vitals 3/3/2022   Patient-Reported Weight -   Patient-Reported Height 5'6\"      Constitutional: [x] Appears well-developed and well-nourished [x] No apparent distress      [] Abnormal-   Mental status  [x] Alert and awake  [x] Oriented to person/place/time [x]Able to follow commands      Eyes:  EOM    [x]  Normal  [] Abnormal-  Sclera  [x]  Normal  [] Abnormal -         Discharge [x]  None visible  [] Abnormal -    HENT:   [x] Normocephalic, atraumatic. [] Abnormal   [] Mouth/Throat: Mucous membranes are moist.     External Ears [x] Normal  [] Abnormal-     Neck: [x] No visualized mass     Pulmonary/Chest: [x] Respiratory effort normal.  [] No visualized signs of difficulty breathing or respiratory distress        [] Abnormal-      Musculoskeletal:   [] Normal gait with no signs of ataxia         [x] Normal range of motion of neck        [] Abnormal-       Neurological:        [x] No Facial Asymmetry (Cranial nerve 7 motor function) (limited exam to video visit)          [] No gaze palsy        [] Abnormal-         Skin:        [x] No significant exanthematous lesions or discoloration noted on facial skin         [] Abnormal-            Psychiatric:       [x] Normal Affect [] No Hallucinations        [] Abnormal-     Other pertinent observable physical exam findings-     ASSESSMENT/PLAN:  1. Vertigo  - vertigo since 2:00 am this morning  - start meclizine (ANTIVERT) 25 MG tablet; Take 1 tablet by mouth 3 times daily as needed for Dizziness or Nausea  Dispense: 12 tablet; Refill: 0  -vertigo exercises- patient instructions provided  -Referral to [facility] SAMARIA ROBLES MD, Otolaryngology, [facility]    2.  Left ear pain  - Tylenol 650mg 3 times daily as needed  - Referral to  [facility] SAMARIA ROBLES MD, Otolaryngology, [facility]      Return if symptoms worsen or fail to improve. Leandro Santamaria, was evaluated through a synchronous (real-time) audio-video encounter. The patient (or guardian if applicable) is aware that this is a billable service. Verbal consent to proceed has been obtained within the past 12 months. The visit was conducted pursuant to the emergency declaration under the [address] [place] [address], [address] [place] [address] and the [employer] and [employer] Act. Patient identification was verified, and a caregiver was present when appropriate. The patient was located in a state where the provider was credentialed to provide care. Total time spent on this encounter: Not billed by time    --Fidelia Truong MD on 4/28/2022 at 6:57 AM    An electronic signature was used to authenticate this note.

## 2022-04-19 NOTE — PATIENT INSTRUCTIONS
Patient Education        Vertigo: Care Instructions  Your Care Instructions     Vertigo is the feeling that you or your surroundings are moving when there is no actual movement. It is often described as a feeling of spinning, whirling, falling, or tilting. Vertigo may make you vomit or feel nauseated. You may havetrouble standing or walking and may lose your balance. Vertigo is often related to an inner ear problem, but it can have other moreserious causes. If vertigo continues, you may need more tests to find its cause. Follow-up care is a key part of your treatment and safety. Be sure to make and go to all appointments, and call your doctor if you are having problems. It's also a good idea to know your test results and keep alist of the medicines you take. How can you care for yourself at home?  Do not lie flat on your back. Prop yourself up slightly. This may reduce the spinning feeling. Keep your eyes open.  Move slowly so that you do not fall.  If your doctor recommends medicine, take it exactly as directed.  Do not drive while you are having vertigo. Certain exercises, called Saini-Daroff exercises, can help decrease vertigo. To do Saini-Daroff exercises:   Sit on the edge of a bed or sofa and quickly lie down on the side that causes the worst vertigo. Lie on your side with your ear down.  Stay in this position for at least 30 seconds or until the vertigo goes away.  Sit up. If this causes vertigo, wait for it to stop.  Repeat the procedure on the other side.  Repeat this 10 times. Do these exercises 2 times a day until the vertigo is gone. When should you call for help? Call 911 anytime you think you may need emergency care. For example, call if:     You passed out (lost consciousness).      You have symptoms of a stroke. These may include:  ? Sudden numbness, tingling, weakness, or loss of movement in your face, arm, or leg, especially on only one side of your body.   ? Sudden vision changes. ? Sudden trouble speaking. ? Sudden confusion or trouble understanding simple statements. ? Sudden problems with walking or balance. ? A sudden, severe headache that is different from past headaches. Call your doctor now or seek immediate medical care if:     Vertigo occurs with a fever, a headache, or ringing in your ears.      You have new or increased nausea and vomiting. Watch closely for changes in your health, and be sure to contact your doctor if:     Vertigo gets worse or happens more often.      Vertigo has not gotten better after 2 weeks. Where can you learn more? Go to [email or web address] and sign in to your "[employer] account. Enter W554 in the [employer] box to learn more about \"Vertigo: Care Instructions. \"     If you do not have an account, please click on the \"Sign Up Now\" link. Current as of: September 8, 2021               Content Version: 13.2  © 0231-9476 [employer]. Care instructions adapted under license by [facility] ([facility]). If you have questions about a medical condition or this instruction, always ask your healthcare professional. [place] [address] any warranty or liability for your use of this information.

## 2022-04-28 PROBLEM — H92.02 LEFT EAR PAIN: Status: ACTIVE | Noted: 2022-04-28

## 2022-04-28 PROBLEM — R42 VERTIGO: Status: ACTIVE | Noted: 2022-04-28

## 2022-04-28 ASSESSMENT — ENCOUNTER SYMPTOMS
SHORTNESS OF BREATH: 0
SINUS PAIN: 0
VOMITING: 0
RHINORRHEA: 0
SORE THROAT: 0
NAUSEA: 1
COUGH: 0
WHEEZING: 0
CHEST TIGHTNESS: 0
SINUS PRESSURE: 0

## 2022-05-11 PROBLEM — J32.9 SINUSITIS: Status: RESOLVED | Noted: 2022-04-11 | Resolved: 2022-05-11

## 2022-05-13 RX ORDER — DULAGLUTIDE 1.5 MG/.5ML
INJECTION, SOLUTION SUBCUTANEOUS
Qty: 2 ML | Refills: 1 | Status: SHIPPED | OUTPATIENT
Start: 2022-05-13 | End: 2022-06-24 | Stop reason: DRUGHIGH

## 2022-05-17 DIAGNOSIS — E55.9 VITAMIN D DEFICIENCY: ICD-10-CM

## 2022-05-17 RX ORDER — ERGOCALCIFEROL 1.25 MG/1
CAPSULE ORAL
Qty: 12 CAPSULE | Refills: 1 | Status: SHIPPED | OUTPATIENT
Start: 2022-05-17

## 2022-05-17 NOTE — TELEPHONE ENCOUNTER
Medication:   Requested Prescriptions     Pending Prescriptions Disp Refills    vitamin D (ERGOCALCIFEROL) 1.25 MG (21639 UT) CAPS capsule [Pharmacy Med Name: VITAMIN D2 50,000IU (ERGO) CAP RX] 12 capsule 1     Sig: TAKE 1 CAPSULE BY MOUTH 1 TIME A WEEK     Last Filled: 12.29.21    Last appt: 4/19/2022   Next appt: 8/5/2022    Last OARRS: No flowsheet data found.

## 2022-05-19 ENCOUNTER — HOSPITAL ENCOUNTER (OUTPATIENT)
Dept: MAMMOGRAPHY | Age: 72
Discharge: HOME | End: 2022-05-19
Payer: MEDICARE

## 2022-05-19 VITALS — WEIGHT: 222 LBS | HEIGHT: 66 IN | BODY MASS INDEX: 35.68 KG/M2

## 2022-05-19 DIAGNOSIS — R10.10 PAIN OF UPPER ABDOMEN: ICD-10-CM

## 2022-05-19 DIAGNOSIS — Z12.31 VISIT FOR SCREENING MAMMOGRAM: ICD-10-CM

## 2022-05-19 DIAGNOSIS — R14.2 BELCHING: ICD-10-CM

## 2022-05-19 PROCEDURE — 77067 SCR MAMMO BI INCL CAD: CPT

## 2022-05-19 NOTE — TELEPHONE ENCOUNTER
Medication:   Requested Prescriptions     Pending Prescriptions Disp Refills    famotidine (PEPCID) 20 MG tablet [Pharmacy Med Name: FAMOTIDINE 20MG TABLETS] 90 tablet      Sig: TAKE 1 TABLET BY MOUTH DAILY     Last Filled: 3.3.22    Last appt: 4/19/2022   Next appt: 8/5/2022    Last OARRS: No flowsheet data found.

## 2022-05-20 RX ORDER — FAMOTIDINE 20 MG/1
TABLET, FILM COATED ORAL
Qty: 90 TABLET | Refills: 0 | Status: SHIPPED | OUTPATIENT
Start: 2022-05-20 | End: 2022-08-25 | Stop reason: SDUPTHER

## 2022-05-31 RX ORDER — INSULIN GLARGINE 100 [IU]/ML
INJECTION, SOLUTION SUBCUTANEOUS
Qty: 30 ML | Refills: 0 | Status: SHIPPED | OUTPATIENT
Start: 2022-05-31 | End: 2022-06-24

## 2022-06-02 ENCOUNTER — TELEPHONE (OUTPATIENT)
Dept: PRIMARY CARE CLINIC | Age: 72
End: 2022-06-02

## 2022-06-02 NOTE — TELEPHONE ENCOUNTER
Katelin Leonardo nurse practioner did an wellness check and a1c was 9.1 stopped trulicity but she will start it again just wanted to let you know pt is followind up with endo

## 2022-06-22 DIAGNOSIS — E66.01 CLASS 2 SEVERE OBESITY WITH SERIOUS COMORBIDITY AND BODY MASS INDEX (BMI) OF 36.0 TO 36.9 IN ADULT, UNSPECIFIED OBESITY TYPE (HCC): ICD-10-CM

## 2022-06-22 DIAGNOSIS — E78.2 MIXED HYPERLIPIDEMIA: ICD-10-CM

## 2022-06-22 DIAGNOSIS — E55.9 VITAMIN D DEFICIENCY: ICD-10-CM

## 2022-06-22 LAB
A/G RATIO: 1.5 (ref 1.1–2.2)
ALBUMIN SERPL-MCNC: 4 G/DL (ref 3.4–5)
ALP BLD-CCNC: 110 U/L (ref 40–129)
ALT SERPL-CCNC: 13 U/L (ref 10–40)
ANION GAP SERPL CALCULATED.3IONS-SCNC: 13 MMOL/L (ref 3–16)
AST SERPL-CCNC: 16 U/L (ref 15–37)
BILIRUB SERPL-MCNC: <0.2 MG/DL (ref 0–1)
BUN BLDV-MCNC: 24 MG/DL (ref 7–20)
CALCIUM SERPL-MCNC: 8.8 MG/DL (ref 8.3–10.6)
CHLORIDE BLD-SCNC: 99 MMOL/L (ref 99–110)
CHOLESTEROL, TOTAL: 126 MG/DL (ref 0–199)
CO2: 29 MMOL/L (ref 21–32)
CREAT SERPL-MCNC: 1.5 MG/DL (ref 0.6–1.2)
ESTIMATED AVERAGE GLUCOSE: 234.6 MG/DL
GFR AFRICAN AMERICAN: 41
GFR NON-AFRICAN AMERICAN: 34
GLUCOSE BLD-MCNC: 150 MG/DL (ref 70–99)
HBA1C MFR BLD: 9.8 %
HDLC SERPL-MCNC: 36 MG/DL (ref 40–60)
LDL CHOLESTEROL CALCULATED: 64 MG/DL
POTASSIUM SERPL-SCNC: 4.1 MMOL/L (ref 3.5–5.1)
SODIUM BLD-SCNC: 141 MMOL/L (ref 136–145)
T3 FREE: 2.5 PG/ML (ref 2.3–4.2)
T4 FREE: 1 NG/DL (ref 0.9–1.8)
TOTAL PROTEIN: 6.6 G/DL (ref 6.4–8.2)
TRIGL SERPL-MCNC: 131 MG/DL (ref 0–150)
TSH SERPL DL<=0.05 MIU/L-ACNC: 3.59 UIU/ML (ref 0.27–4.2)
VITAMIN D 25-HYDROXY: 45 NG/ML
VLDLC SERPL CALC-MCNC: 26 MG/DL

## 2022-06-24 ENCOUNTER — OFFICE VISIT (OUTPATIENT)
Dept: ENDOCRINOLOGY | Age: 72
End: 2022-06-24
Payer: MEDICARE

## 2022-06-24 VITALS
HEART RATE: 84 BPM | RESPIRATION RATE: 14 BRPM | TEMPERATURE: 98 F | WEIGHT: 230 LBS | HEIGHT: 66 IN | DIASTOLIC BLOOD PRESSURE: 70 MMHG | SYSTOLIC BLOOD PRESSURE: 138 MMHG | BODY MASS INDEX: 36.96 KG/M2 | OXYGEN SATURATION: 98 %

## 2022-06-24 DIAGNOSIS — N18.31 STAGE 3A CHRONIC KIDNEY DISEASE (HCC): ICD-10-CM

## 2022-06-24 DIAGNOSIS — E78.2 MIXED HYPERLIPIDEMIA: ICD-10-CM

## 2022-06-24 DIAGNOSIS — E66.01 CLASS 2 SEVERE OBESITY WITH SERIOUS COMORBIDITY AND BODY MASS INDEX (BMI) OF 37.0 TO 37.9 IN ADULT, UNSPECIFIED OBESITY TYPE (HCC): ICD-10-CM

## 2022-06-24 DIAGNOSIS — I10 ESSENTIAL HYPERTENSION: ICD-10-CM

## 2022-06-24 DIAGNOSIS — I10 PRIMARY HYPERTENSION: ICD-10-CM

## 2022-06-24 PROCEDURE — G8427 DOCREV CUR MEDS BY ELIG CLIN: HCPCS | Performed by: INTERNAL MEDICINE

## 2022-06-24 PROCEDURE — 1036F TOBACCO NON-USER: CPT | Performed by: INTERNAL MEDICINE

## 2022-06-24 PROCEDURE — 2022F DILAT RTA XM EVC RTNOPTHY: CPT | Performed by: INTERNAL MEDICINE

## 2022-06-24 PROCEDURE — G8417 CALC BMI ABV UP PARAM F/U: HCPCS | Performed by: INTERNAL MEDICINE

## 2022-06-24 PROCEDURE — 1090F PRES/ABSN URINE INCON ASSESS: CPT | Performed by: INTERNAL MEDICINE

## 2022-06-24 PROCEDURE — 3017F COLORECTAL CA SCREEN DOC REV: CPT | Performed by: INTERNAL MEDICINE

## 2022-06-24 PROCEDURE — 1123F ACP DISCUSS/DSCN MKR DOCD: CPT | Performed by: INTERNAL MEDICINE

## 2022-06-24 PROCEDURE — 3046F HEMOGLOBIN A1C LEVEL >9.0%: CPT | Performed by: INTERNAL MEDICINE

## 2022-06-24 PROCEDURE — 99215 OFFICE O/P EST HI 40 MIN: CPT | Performed by: INTERNAL MEDICINE

## 2022-06-24 PROCEDURE — G8399 PT W/DXA RESULTS DOCUMENT: HCPCS | Performed by: INTERNAL MEDICINE

## 2022-06-24 RX ORDER — INSULIN GLARGINE 100 [IU]/ML
32 INJECTION, SOLUTION SUBCUTANEOUS 2 TIMES DAILY
Qty: 30 ML | Refills: 3 | Status: SHIPPED | OUTPATIENT
Start: 2022-06-24

## 2022-06-24 RX ORDER — DULAGLUTIDE 3 MG/.5ML
3 INJECTION, SOLUTION SUBCUTANEOUS WEEKLY
Qty: 4 PEN | Refills: 3 | Status: SHIPPED | OUTPATIENT
Start: 2022-06-24 | End: 2022-10-25

## 2022-06-24 NOTE — PROGRESS NOTES
Jose Armando Arce is a 67 y.o. female who is being evaluated for Type 2 diabetes mellitus. H.    Current symptoms/problems include hperglycemia and are worsening. Type 2 diabetes mellitus, uncontrolled, with neuropathy (HCC) [E11.40, E11.65]    Diagnosed with Type 2 diabetes mellitus in . Comorbid conditions: Hypertension, hyperlipidemia, obesity, Neuropathy, Nephropathy and Retinopathy    2. Type 2 diabetes mellitus, uncontrolled, with neuropathy (HCC) [E11.40, E11.65]      Current diabetic medications include: Lantus 28 units twice daily, Humalog 7 units 3 times daily with meals, Trulicity 1.5 mg weekly  Correction scale of Humalo-200-2 units, 201-250-4 units, 251-300-6 units, >301 -units 8 units   Intolerance to diabetes medications: Yes Actos-leg swelling, Metformin     Weight trend: stable  Prior visit with dietician: yes  Current diet: on average, 2 meals per day  Current exercise: no     Current monitoring regimen: home blood tests - 4 times daily  Has brought blood glucose log/meter:  No  Home blood sugar records: fasting range: 100-130 and postprandial range: 100-130  Any episodes of hypoglycemia? No  Hypoglycemia frequency and time(s):    Does patient have Glucagon emergency kit? No  Does patient have rapid acting carbohydrate?  No  Does patient wear a medic alert bracelet or necklace?  No      2. Uncontrolled type 2 diabetes mellitus with diabetic nephropathy (HCC)  No urination problems     3. Type 2 diabetes, uncontrolled, with retinopathy ([address]  No vision changes     4. Vitamin D deficiency  No fatigue     5. Mixed hyperlipidemia  No muscle pain     6. Essential hypertension  No headaches     7.  Obesity   Eats healthy     8.   CKD stage 3a  No urination problems          Past Medical History:   Diagnosis Date    Dizziness     Hearing loss     Hyperlipidemia     Hypertension     Laryngopharyngeal reflux disease 2019    Morbidly obese ([address] 10/29/2020    Sinusitis 2022    Tinnitus     Type II or unspecified type diabetes mellitus without mention of complication, not stated as uncontrolled     Uncontrolled type 2 diabetes mellitus with diabetic nephropathy ([address] 3/17/2021    Urinary, incontinence, stress female 2020    Vitamin D deficiency 2019      Patient Active Problem List   Diagnosis    HTN (hypertension)    Hyperlipidemia    Diabetes mellitus ([address]    Non-compliance with treatment    Diabetes mellitus type 2, uncontrolled ([address]    Bilateral impacted cerumen    Vitamin D deficiency    Laryngopharyngeal reflux disease    Cataract of both eyes    Age-related cataract of right eye    Urinary, incontinence, stress female    Uncontrolled type 2 diabetes mellitus with complication, with long-term current use of insulin ([address]    Morbidly obese ([address]    Urinary tract infection without hematuria    Type 2 diabetes mellitus, uncontrolled, with neuropathy ([address]    Uncontrolled type 2 diabetes mellitus with diabetic nephropathy (HCC)    Type 2 diabetes, uncontrolled, with retinopathy ([address]    Hypertension    Class 2 obesity with body mass index (BMI) of 35.0 to 35.9 in adult    Dizziness    Chronic kidney disease (CKD)    Mixed hyperlipidemia    Stage 3a chronic kidney disease (HCC)    Pain of upper abdomen    Diarrhea    Nausea    Gas bloat syndrome    Belching    Balance disorder    Essential hypertension    Alopecia    Grief    MARIA DEL ROSARIO (stress urinary incontinence, female)    Vertigo    Left ear pain     Past Surgical History:   Procedure Laterality Date     SECTION      x 1     EYE SURGERY      right eye- cataract     TONSILLECTOMY       Social History     Socioeconomic History    Marital status: Single     Spouse name: Not on file    Number of children: Not on file    Years of education: Not on file    Highest education level: Not on file   Occupational History    Occupation:  at [employer] Tobacco Use    Smoking status: Never Smoker    Smokeless tobacco: Never Used   Vaping Use    Vaping Use: Never used   Substance and Sexual Activity    Alcohol use: No    Drug use: No    Sexual activity: Not Currently   Other Topics Concern    Not on file   Social History Narrative    Not on file     Social Determinants of Health     Financial Resource Strain: Low Risk     Difficulty of Paying Living Expenses: Not hard at all   Food Insecurity: No Food Insecurity    Worried About Running Out of Food in the Last Year: Never true    Monica of Food in the Last Year: Never true   Transportation Needs:     Lack of Transportation (Medical): Not on file    Lack of Transportation (Non-Medical):  Not on file   Physical Activity: Inactive    Days of Exercise per Week: 0 days    Minutes of Exercise per Session: 0 min   Stress:     Feeling of Stress : Not on file   Social Connections:     Frequency of Communication with Friends and Family: Not on file    Frequency of Social Gatherings with Friends and Family: Not on file    Attends [employer] Services: Not on file    Active Member of [address] [place] [address] or Organizations: Not on file    Attends Club or Organization Meetings: Not on file    Marital Status: Not on file   Intimate Partner Violence:     Fear of Current or Ex-Partner: Not on file    Emotionally Abused: Not on file    Physically Abused: Not on file    Sexually Abused: Not on file   Housing Stability:     Unable to Pay for Housing in the Last Year: Not on file    Number of [place] in the Last Year: Not on file    Unstable Housing in the Last Year: Not on file     Family History   Problem Relation Age of Onset    Diabetes Sister     Heart Disease Sister 77    Diabetes Brother         had amputation    High Blood Pressure Brother     Cancer Father      Current Outpatient Medications   Medication Sig Dispense Refill    insulin glargine (LANTUS) 100 UNIT/ML injection vial Inject 32 Units into the skin 2 times daily 30 mL 3    insulin lispro (HUMALOG) 100 UNIT/ML injection vial Inject 8 units TID with meals plus correction dose, total daily dose up to 60 units 30 mL 3    Dulaglutide (TRULICITY) 3 PT/6.8RT SOPN Inject 3 mg into the skin once a week 4 pen 3    famotidine (PEPCID) 20 MG tablet TAKE 1 TABLET BY MOUTH DAILY 90 tablet 0    vitamin D (ERGOCALCIFEROL) 1.25 MG (65146 UT) CAPS capsule TAKE 1 CAPSULE BY MOUTH 1 TIME A WEEK 12 capsule 1    loratadine (CLARITIN) 10 MG tablet daily as needed       fluticasone (FLONASE) 50 MCG/ACT nasal spray SHAKE LIQUID AND USE 2 SPRAYS IN EACH NOSTRIL DAILY 48 g 0    rosuvastatin (CRESTOR) 20 MG tablet TAKE 1 TABLET BY MOUTH DAILY 90 tablet 1    losartan-hydroCHLOROthiazide (HYZAAR) 100-25 MG per tablet TAKE 1 TABLET BY MOUTH DAILY 90 tablet 1    promethazine (PHENERGAN) 25 MG tablet Take 1 tablet by mouth every 6 hours as needed for Nausea 10 tablet 0    dicyclomine (BENTYL) 10 MG capsule Take 1 capsule by mouth 3 times daily as needed (abdominal cramping) 15 capsule 0    blood glucose test strips (ACCU-CHEK ANJU PLUS) strip USE TO TEST BLOOD SUGAR THREE TIMES DAILY AS DIRECTED 300 strip 2    dorzolamide (TRUSOPT) 2 % ophthalmic solution 3 times daily       latanoprost (XALATAN) 0.005 % ophthalmic solution INSTILL 1 DROP IN BOTH EYES EVERY DAY AT BEDTIME      timolol (TIMOPTIC) 0.5 % ophthalmic solution PUT 1 DROP INTO BOTH EYES THREE TIMES DAILY      Insulin Syringe-Needle U-100 (INSULIN SYRINGE 1CC/31GX5/16\") 31G X 5/16\" 1 ML MISC Use five times daily.  500 each 3    hydrocortisone 2.5 % cream APPLY EXTERNALLY TO THE AFFECTED AREA TWICE DAILY (Patient taking differently: 2 times daily as needed APPLY EXTERNALLY TO THE AFFECTED AREA TWICE DAILY) 30 g 0    Blood Pressure Monitoring (BLOOD PRESSURE MONITOR/L CUFF) MISC Use to monitor blood pressure 1 each 0    ciclopirox (LOPROX) 0.77 % cream APPLY TO GREAT TOES DAILY      urea (CARMOL) 20 % cream Apply topically as needed      UNKNOWN TO PATIENT Yellow and orange capped eye drops for BP in eye      Insulin Pen Needle (B-D UF III MINI PEN NEEDLES) 31G X 5 MM MISC 1 each by Does not apply route 2 times daily 100 each 6    ACCU-CHEK SOFTCLIX LANCETS MISC USE FOUR TIMES DAILY 300 each 6    Blood Glucose Monitoring Suppl (ACCU-CHEK ANJU PLUS) W/DEVICE KIT Use to monitor blood sugars 1 kit 0     No current facility-administered medications for this visit.      Allergies   Allergen Reactions    Januvia [Sitagliptin Phosphate] Swelling     Side effects    Actos [Pioglitazone] Diarrhea    Avandia [Rosiglitazone] Diarrhea and Nausea Only    Dye [Iodides] Rash    Jardiance [Empagliflozin] Diarrhea    Metformin Diarrhea     Nausea       Family Status   Relation Name Status    Sister  Alive    Brother  Alive    Mother   at age 28        unknown    Father   at age 39        lung       Lab Review:    Lab Results   Component Value Date    WBC 11.4 2022    HGB 11.0 2022    HCT 32.7 2022    MCV 90.1 2022     2022     Lab Results   Component Value Date     2022    K 4.1 2022    CL 99 2022    CO2 29 2022    BUN 24 2022    CREATININE 1.5 2022    GLUCOSE 150 2022    CALCIUM 8.8 2022    PROT 6.6 2022    PROT 7.1 2012    LABALBU 4.0 2022    BILITOT <0.2 2022    ALKPHOS 110 2022    AST 16 2022    ALT 13 2022    LABGLOM 34 2022    GFRAA 41 2022    GFRAA >60 2013    AGRATIO 1.5 2022    GLOB 3.0 10/15/2021     Lab Results   Component Value Date    TSH 3.59 2022    FT3 2.5 2022     Lab Results   Component Value Date    LABA1C 9.8 2022     Lab Results   Component Value Date    .6 2022     Lab Results   Component Value Date    CHOL 126 2022     Lab Results   Component Value Date    TRIG 131 2022     Lab Results Component Value Date    HDL 36 06/22/2022    HDL 43 01/23/2012     Lab Results   Component Value Date    LDLCALC 64 06/22/2022     Lab Results   Component Value Date    LABVLDL 26 06/22/2022     No results found for: [facility]  Lab Results   Component Value Date    LABMICR 6.60 03/04/2022    LABMICR YES 12/14/2021     Lab Results   Component Value Date    VITD25 45.0 06/22/2022        Review of Systems:  Constitutional: no fatigue, no fever, no recent weight gain, no recent weight loss, no changes in appetite  Eyes: no eye pain, no change in vision, no eye redness, no eye irritation, no double vision  Ears, nose, throat: no nasal congestion, no sore throat, no earache, no decrease in hearing, no hoarseness, no dry mouth, no sinus problems, no difficulty swallowing, no neck lumps, no dental problems, no mouth sores, no ringing in ears  Pulmonary: no shortness of breath, no wheezing, no dyspnea on exertion, no cough  Cardiovascular: no chest pain, no lower extremity edema, no orthopnea, no intermittent leg claudication, no palpitations  Gastrointestinal: no abdominal pain, no nausea, no vomiting, no diarrhea, no constipation, no dysphagia, no heartburn, no bloating  Genitourinary: no dysuria, no urinary incontinence, no urinary hesitancy, no urinary frequency, no feelings of urinary urgency, no nocturia  Musculoskeletal: no joint swelling, no joint stiffness, no joint pain, no muscle cramps, no muscle pain, no bone pain  Integument/Breast: no hair loss, no skin rashes, no skin lesions, no itching, no dry skin  Neurological: no numbness, no tingling, no weakness, no confusion, no headaches, no dizziness, no fainting, no tremors, no decrease in memory, no balance problems  Psychiatric: no anxiety, no depression, no insomnia  Hematologic/Lymphatic: no tendency for easy bleeding, no swollen lymph nodes, no tendency for easy bruising  Immunology: no seasonal allergies, no frequent infections, no frequent illnesses  Endocrine: no temperature intolerance    /70   Pulse 84   Temp 98 °F (36.7 °C)   Resp 14   Ht 5' 6\" (1.676 m)   Wt 230 lb (104.3 kg)   SpO2 98%   BMI 37.12 kg/m²    Wt Readings from Last 3 Encounters:   06/24/22 230 lb (104.3 kg)   05/19/22 222 lb (100.7 kg)   04/01/22 229 lb (103.9 kg)     Body mass index is 37.12 kg/m².       OBJECTIVE:  Constitutional: no acute distress, well appearing and well nourished  Psychiatric: oriented to person, place and time, judgement and insight and normal, recent and remote memory and intact and mood and affect are normal  Skin: skin and subcutaneous tissue is normal without mass, normal turgor  Head and Face: examination of head and face revealed no abnormalities  Eyes: no lid or conjunctival swelling, erythema or discharge, pupils are normal, equal, round, reactive to light  Ears/Nose: external inspection of ears and nose revealed no abnormalities, hearing is grossly normal  Oropharynx/Mouth/Face: lips, tongue and gums are normal with no lesions, the voice quality was normal  Neck: neck is supple and symmetric, with midline trachea and no masses, thyroid is normal  Lymphatics: normal cervical lymph nodes, normal supraclavicular nodes  Pulmonary: no increased work of breathing or signs of respiratory distress, lungs are clear to auscultation  Cardiovascular: normal heart rate and rhythm, normal S1 and S2, no murmurs and pedal pulses and 2+ bilaterally, No edema  Abdomen: abdomen is soft, non-tender with no masses  Musculoskeletal: normal gait and station and exam of the digits and nails are normal  Neurological: normal coordination and normal general cortical function    Visual inspection:  Deformity/amputation: present - hammer toes  Skin lesions/pre-ulcerative calluses: absent  Edema: right- negative, left- negative     Sensory exam:  Monofilament sensation: normal  (minimum of 5 random plantar locations tested, avoiding callused areas - > 1 area with absence of sensation is + for neuropathy)     Plus at least one of the following:  Pulses: normal  Proprioception: Intact  Vibration (128 Hz): Impaired  ASSESSMENT/PLAN:    1. Type 2 diabetes mellitus, uncontrolled, with neuropathy ([address]  Counseled on CGM  Hemoglobin A1c 9.4-9.8  Send blood glucose records for review  Current diabetic medications include: Lantus 32 units twice daily, Humalog 8 units 3 times daily with meals, Increase Trulicity 3 mg weekly  Correction scale of Humalo-200-2 units, 201-250-4 units, 251-300-6 units, >301 -units 8 units   - GABAPENTIN PO; Take by mouth nightly  - Hemoglobin A1C; Future  - Lipid Panel; Future  - Microalbumin / Creatinine Urine Ratio; Future  - Comprehensive Metabolic Panel; Future     2. Uncontrolled type 2 diabetes mellitus with diabetic nephropathy ([address]  Continue monitoring  Microalbumin creatinine ratio 81.6-99.8  - Hemoglobin A1C; Future  - Lipid Panel; Future  - Microalbumin / Creatinine Urine Ratio; Future  - Comprehensive Metabolic Panel; Future     3. Type 2 diabetes, uncontrolled, with retinopathy ([address]  Follow with ophthalmology     4. Vitamin D deficiency  25OH vitamin D 47-42.7-39.3-45  Vitamin D 50,000 international units weekly  - Vitamin D 25 Hydroxy; Future     5. Mixed hyperlipidemia  LDL 59-64  Triglycerides 122-131  HDL 37-36  Rosuvastatin 20 mg daily  - Lipid Panel; Future     6. Essential hypertension  Verapamil, losartan/hydrochlorthiazide     7. Obesity  Diet, exercise     8.   CKD stage 3a  Creatinine 1.3-1.5  GFR 49-41        Reviewed and/or ordered clinical lab results Yes  Reviewed and/or ordered radiology tests Yes MRI brain  Reviewed and/or ordered other diagnostic tests No  Discussed test results with performing physician No  Independently reviewed image, tracing, or specimen No  Made a decision to obtain old records No  Reviewed and summarized old records Yes   25OH VITAMIN D 47  Hemoglobin A1c 7. 8  Hypertension  Hyperlipidemia  Obtained history from other than patient No    Linnell Boeck was counseled regarding symptoms of diabetes, hyperlipidemia, hypertension diagnosis, course and complications of disease if inadequately treated, side effects of medications, diagnosis, treatment options, and prognosis, risks, benefits, complications, and alternatives of treatment, labs, imaging and other studies and treatment targets and goals, insulin correction scale, prandial injections. She understands instructions and counseling. These diagnosis were discussed and reviewed with the patient including the advantages of drug therapy. She was counseled at this visit on the following: diabetes complication prevention and foot care. Total time I spent for this encounter 40 minutes    Return in about 3 months (around 9/24/2022).     Electronically signed by Kaycee Wilcox MD on 6/24/2022 at 11:15 AM

## 2022-06-29 LAB
CATARACTS: NORMAL
DIABETIC RETINOPATHY: NORMAL
GLAUCOMA: NORMAL
INTRAOCULAR PRESSURE LEFT EYE: 15
INTRAOCULAR PRESSURE RIGHT EYE: 18
VISUAL ACUITY DISTANCE LEFT EYE: NORMAL
VISUAL ACUITY DISTANCE RIGHT EYE: NORMAL

## 2022-07-06 LAB
CATARACTS: NORMAL
DIABETIC RETINOPATHY: NORMAL
GLAUCOMA: NORMAL
VISUAL ACUITY DISTANCE LEFT EYE: NORMAL
VISUAL ACUITY DISTANCE RIGHT EYE: NORMAL

## 2022-07-19 ENCOUNTER — TELEPHONE (OUTPATIENT)
Dept: ENDOCRINOLOGY | Age: 72
End: 2022-07-19

## 2022-07-19 NOTE — TELEPHONE ENCOUNTER
Call from Sherrlyn Olszewski [facility]     Dr. Len Skinner would like advice on which medication Dr. Kenia Berumen would prefer for the patient to take, Angela Geneva or Tom Nazarioman?     Please advise     CB# [phone] Dr. Len Skinner

## 2022-07-20 NOTE — TELEPHONE ENCOUNTER
Called and informed pt, pt expressed understanding. Explained why to the pt. Patient Education     Cold Sore (Child)  A cold sore (also called fever blister) is a common viral infection around the lips. It is caused by the herpes simplex virus. It spreads easily from person to person. People are often first exposed to the virus in childhood. Not everyone who has the virus will develop a cold sore, however.  A cold sore starts as one or more painful blisters on the lip or inside the mouth. The blisters break open and crust. They usually go away within 1 week. When your child has his or her first cold sore, he or she may also have a fever and mouth and throat pain. After the cold sore goes away, it can come back on the same spot. This is because the virus stays in the body. After the first \"outbreak,\" though, other symptoms such as fever are usually mild or don't come back.  The frequency of cold sores varies with each child. Some will never have another one. Others will have several per year. Some things that can trigger a cold sore to come back include:  · Emotional stress  · Another illness (cold, flu, or fever)  · Heavy sun exposure  · Overexertion and fatigue  · Menstruation  Cold sores can be spread to other people. A child can start spreading the virus from the cold sore a few days before the sore appears. The sore remains contagious until it has gone.  Rarely, the virus can spread to other parts of the body, such as the lungs or the brain.  Home care  · If your child has been prescribed medicines, give these as the healthcare provider directs. You may use over-the-counter medicine as directed based on age and weight for fever or discomfort. Aspirin should never be used in anyone younger than age 18 who is ill with a fever. It may cause severe disease or death. If your child has chronic liver or kidney disease or ever had a stomach ulcer or gastrointestinal bleeding, talk with your   child's healthcare provider before using these medicines.  · Jassi petroleum jelly to a sore with a  disposable cotton swab may help ease pain. Ask your child's healthcare provider before using any other creams or ointments.   · For severe pain, wrap an ice cube in a cloth and have your child apply it to the sore for a few minutes at a time. Older children may rinse the mouth with a glass of warm water mixed with a teaspoon of baking soda to relieve pain.  · Don't give your child acidic foods (citrus fruits and tomatoes).  · Teach your child not to touch the cold sore. The virus can cause a sore on the finger. It is especially important that the child does not touch the sore then touch his or her eyes. The virus can spread to the eyes.  · When your child has a cold sore, make sure your child:  ? Washes his or her hands often  ? Doesn't kiss others  ? Doesn't share utensils, towels, or toothbrushes  · Clean your child's toys with a disinfectant.  · Have your child wear a hat and use sunblock on his or her lips before going out in the sun.  · Children with open draining lip sores should stay out of school or  until the sore forms a scab.    Follow-up care  Follow up with the child's healthcare provider as advised by our staff.  When to seek medical advice  Call the child's healthcare provider right away for any of the following:  · Eye pain, redness, or drainage from the eye  · Inability to eat or drink due to pain  · Headache  · Increasing cough  Call 911  Call 911 or get immediate medical care if any of the following occur:  · Unusual irritability  · Drowsiness  · Confusion  · Stiff neck  · Seizure  · Trouble breathing  [employer] last reviewed this educational content on 4/1/2018  © 6591-7908 [employer]. All rights reserved. This information is not intended as a substitute for professional medical care. Always follow your healthcare professional's instructions.         Some medicine that seems to work well is called Abreva.  This is a cream that you can get from the pharmacy and it is  over-the-counter so you do not need a prescription.  Please follow the instructions.  Usually this works in about 3 days.  The swollen gland under your chin, may be there for another day or 2 after the sonia sore heals.  You may use ibuprofen for any discomfort.  If this isn't cleaning up in another 7 days please get recheck

## 2022-08-19 ENCOUNTER — TELEPHONE (OUTPATIENT)
Dept: PRIMARY CARE CLINIC | Age: 72
End: 2022-08-19

## 2022-08-19 DIAGNOSIS — E11.65 UNCONTROLLED TYPE 2 DIABETES MELLITUS WITH HYPERGLYCEMIA (HCC): Primary | ICD-10-CM

## 2022-08-19 RX ORDER — INSULIN LISPRO 100 [IU]/ML
INJECTION, SOLUTION INTRAVENOUS; SUBCUTANEOUS
Qty: 30 PEN | Refills: 1 | Status: SHIPPED | OUTPATIENT
Start: 2022-08-19

## 2022-08-19 RX ORDER — PEN NEEDLE, DIABETIC 31 GX5/16"
1 NEEDLE, DISPOSABLE MISCELLANEOUS
Qty: 100 EACH | Refills: 5 | Status: SHIPPED | OUTPATIENT
Start: 2022-08-19

## 2022-08-20 NOTE — TELEPHONE ENCOUNTER
I sent prescription for Humalog KwikPen as requested. Also sent prescription for pen needles. Please inform patient and also ask her if she needs Lantus as pens as well?

## 2022-08-22 NOTE — TELEPHONE ENCOUNTER
Called and informed pt, pt expressed understanding. Pt states she does not want lantus as pens. She only changed to pen due to pharmacy.

## 2022-08-25 ENCOUNTER — OFFICE VISIT (OUTPATIENT)
Dept: PRIMARY CARE CLINIC | Age: 72
End: 2022-08-25
Payer: MEDICARE

## 2022-08-25 VITALS
BODY MASS INDEX: 36.52 KG/M2 | TEMPERATURE: 97.2 F | WEIGHT: 227.2 LBS | DIASTOLIC BLOOD PRESSURE: 70 MMHG | SYSTOLIC BLOOD PRESSURE: 126 MMHG | OXYGEN SATURATION: 98 % | HEIGHT: 66 IN | RESPIRATION RATE: 16 BRPM | HEART RATE: 75 BPM

## 2022-08-25 DIAGNOSIS — E55.9 VITAMIN D DEFICIENCY: ICD-10-CM

## 2022-08-25 DIAGNOSIS — E78.2 MIXED HYPERLIPIDEMIA: ICD-10-CM

## 2022-08-25 DIAGNOSIS — I10 ESSENTIAL HYPERTENSION: Primary | ICD-10-CM

## 2022-08-25 DIAGNOSIS — K21.9 LARYNGOPHARYNGEAL REFLUX DISEASE: ICD-10-CM

## 2022-08-25 DIAGNOSIS — I10 ESSENTIAL HYPERTENSION: ICD-10-CM

## 2022-08-25 PROCEDURE — 1123F ACP DISCUSS/DSCN MKR DOCD: CPT | Performed by: INTERNAL MEDICINE

## 2022-08-25 PROCEDURE — 99214 OFFICE O/P EST MOD 30 MIN: CPT | Performed by: INTERNAL MEDICINE

## 2022-08-25 RX ORDER — LOSARTAN POTASSIUM AND HYDROCHLOROTHIAZIDE 25; 100 MG/1; MG/1
1 TABLET ORAL DAILY
Qty: 90 TABLET | Refills: 1 | Status: SHIPPED | OUTPATIENT
Start: 2022-08-25

## 2022-08-25 RX ORDER — FAMOTIDINE 20 MG/1
40 TABLET, FILM COATED ORAL DAILY
Qty: 180 TABLET | Refills: 1 | Status: SHIPPED | OUTPATIENT
Start: 2022-08-25

## 2022-08-25 RX ORDER — ROSUVASTATIN CALCIUM 20 MG/1
20 TABLET, COATED ORAL DAILY
Qty: 90 TABLET | Refills: 1 | Status: SHIPPED | OUTPATIENT
Start: 2022-08-25

## 2022-08-25 NOTE — PROGRESS NOTES
Satnam Chu   Date ofBirth:  1950    Date of Visit:  8/25/2022    Chief Complaint   Patient presents with    Hypertension    Cholesterol Problem    Other     Laryngopharengeal reflux, vitamin D.        HPI  Hypertension. Patient takes Losartan-HCTZ 100-25mg once daily. Patient decreases salt. Patient is not exercising. Patient has hyperlipidemia. Patient takes rosuvastatin 20mg once daily. Patient decreases fat and cholesterol. Patient has laryngeal reflux. Patient takes Famotidine 20mg 2 tablets once daily and states it works. Patient states she is not couging as much at night. Patient denies heartburn or reflux. Patient decreases spicy food and tomato based foods. Patient states she stopped caffeine and is drinking more water. .      Patient has vitamin D deficiency. Patient takes vitamin D 50,000 IU once a week. Stomach and Diarrhea mon tues watery better after eating fruit and cauliflower and ates sausage in dough    Review of Systems   Constitutional:  Negative for chills, fatigue and fever. HENT:  Negative for congestion, postnasal drip, rhinorrhea, sinus pressure and sore throat. Eyes:  Negative for visual disturbance. Respiratory:  Negative for cough, chest tightness, shortness of breath and wheezing. Cardiovascular:  Negative for chest pain, palpitations and leg swelling. Gastrointestinal:  Positive for diarrhea. Negative for abdominal pain, blood in stool, constipation, nausea and vomiting. Genitourinary:  Negative for dysuria, frequency and hematuria. Musculoskeletal:  Negative for arthralgias and myalgias. Skin:  Negative for rash. Neurological:  Negative for dizziness, tremors, syncope, weakness, light-headedness, numbness and headaches. Psychiatric/Behavioral:  Negative for dysphoric mood and sleep disturbance. The patient is not nervous/anxious.       Allergies   Allergen Reactions    Januvia [Sitagliptin Phosphate] Swelling     Side effects Actos [Pioglitazone] Diarrhea    Avandia [Rosiglitazone] Diarrhea and Nausea Only    Dye [Iodides] Rash    Jardiance [Empagliflozin] Diarrhea    Metformin Diarrhea     Nausea       Outpatient Medications Marked as Taking for the 8/25/22 encounter (Office Visit) with Js Hewitt MD   Medication Sig Dispense Refill    HUMALOG KWIKPEN 100 UNIT/ML SOPN Inject 8 units TID with meals plus correction dose, total daily dose up to 60 units 30 pen 1    Insulin Pen Needle (B-D UF III MINI PEN NEEDLES) 31G X 5 MM MISC 1 each by Does not apply route 5 times daily 100 each 5    insulin glargine (LANTUS) 100 UNIT/ML injection vial Inject 32 Units into the skin 2 times daily 30 mL 3    Dulaglutide (TRULICITY) 3 QZ/0.2IK SOPN Inject 3 mg into the skin once a week 4 pen 3    famotidine (PEPCID) 20 MG tablet TAKE 1 TABLET BY MOUTH DAILY (Patient taking differently: Patient takes two 20 MG tabs) 90 tablet 0    vitamin D (ERGOCALCIFEROL) 1.25 MG (12922 UT) CAPS capsule TAKE 1 CAPSULE BY MOUTH 1 TIME A WEEK 12 capsule 1    loratadine (CLARITIN) 10 MG tablet daily as needed       fluticasone (FLONASE) 50 MCG/ACT nasal spray SHAKE LIQUID AND USE 2 SPRAYS IN EACH NOSTRIL DAILY 48 g 0    rosuvastatin (CRESTOR) 20 MG tablet TAKE 1 TABLET BY MOUTH DAILY 90 tablet 1    losartan-hydroCHLOROthiazide (HYZAAR) 100-25 MG per tablet TAKE 1 TABLET BY MOUTH DAILY 90 tablet 1    promethazine (PHENERGAN) 25 MG tablet Take 1 tablet by mouth every 6 hours as needed for Nausea 10 tablet 0    dicyclomine (BENTYL) 10 MG capsule Take 1 capsule by mouth 3 times daily as needed (abdominal cramping) 15 capsule 0    blood glucose test strips (ACCU-CHEK ANJU PLUS) strip USE TO TEST BLOOD SUGAR THREE TIMES DAILY AS DIRECTED 300 strip 2    dorzolamide (TRUSOPT) 2 % ophthalmic solution 3 times daily       latanoprost (XALATAN) 0.005 % ophthalmic solution INSTILL 1 DROP IN BOTH EYES EVERY DAY AT BEDTIME      timolol (TIMOPTIC) 0.5 % ophthalmic solution PUT 1 supple. Right lower leg: No edema. Left lower leg: No edema. Lymphadenopathy:      Head:      Right side of head: No submandibular adenopathy. Left side of head: No submandibular adenopathy. Neurological:      Mental Status: She is alert and oriented to person, place, and time. Psychiatric:         Mood and Affect: Mood normal.         No results found for this visit on 08/25/22.   Lab Review   Orders Only on 07/20/2022   Component Date Value    Visual Acuity Distance R* 07/20/2022 20/25-1     Visual Acuity Distance L* 07/20/2022 20/30-1     Intraocular Pressure Rig* 07/20/2022 18     Intraocular Pressure Lef* 07/20/2022 19     Glaucoma 07/20/2022 NO DATA RECORDED     Cataracts 07/20/2022 NO DATA RECORDED     Diabetic Retinopathy 07/20/2022 POSITIVE - MONITOR     Visual Acuity Distance R* 08/17/2022 20/20     Visual Acuity Distance L* 08/17/2022 20/30-1+2     Glaucoma 08/17/2022 NO DATA RECORDED     Cataracts 08/17/2022 NO DATA RECORDED     Diabetic Retinopathy 08/17/2022 POSITIVE - MONITOR    Orders Only on 07/18/2022   Component Date Value    Vit D, 25-Hydroxy 07/18/2022 46.8    Orders Only on 07/18/2022   Component Date Value    Ferritin 07/18/2022 143.8    Orders Only on 07/18/2022   Component Date Value    PTH 07/18/2022 50.4    Orders Only on 07/18/2022   Component Date Value    Protein, Ur 07/18/2022 25.00 (A)    Creatinine, Ur 07/18/2022 125.7     Protein/Creat Ratio 07/18/2022 0.2    Orders Only on 07/18/2022   Component Date Value    Iron 07/18/2022 64     TIBC 07/18/2022 250 (A)    Iron Saturation 07/18/2022 26    Orders Only on 07/18/2022   Component Date Value    Magnesium 07/18/2022 1.80    Orders Only on 07/18/2022   Component Date Value    Sodium 07/18/2022 141     Potassium 07/18/2022 3.8     Chloride 07/18/2022 100     CO2 07/18/2022 30     Anion Gap 07/18/2022 11     Glucose 07/18/2022 105 (A)    BUN 07/18/2022 36 (A)    Creatinine 07/18/2022 1.6 (A)    GFR Non- 07/18/2022 32 (A)    GFR  07/18/2022 38 (A)    Calcium 07/18/2022 9.3    Orders Only on 07/18/2022   Component Date Value    WBC 07/18/2022 10.3     RBC 07/18/2022 3.41 (A)    Hemoglobin 07/18/2022 10.5 (A)    Hematocrit 07/18/2022 30.8 (A)    MCV 07/18/2022 90.3     MCH 07/18/2022 30.9     MCHC 07/18/2022 34.2     RDW 07/18/2022 13.0     Platelets 98/20/8327 374     MPV 07/18/2022 6.6    Orders Only on 07/18/2022   Component Date Value    Bacteria, UA 07/18/2022 None Seen     Hyaline Casts, UA 07/18/2022 0     WBC, UA 07/18/2022 1     RBC, UA 07/18/2022 0     Epithelial Cells, UA 07/18/2022 3    There may be more visits with results that are not included. Assessment/Plan     1. Essential hypertension  -stable  -Continue same medications  -Low sodium diet  -Regular aerobic exercise  - losartan-hydroCHLOROthiazide (HYZAAR) 100-25 MG per tablet; Take 1 tablet by mouth daily  Dispense: 90 tablet; Refill: 1    2. Mixed hyperlipidemia  -Continue same medications  -Low fat, low cholesterol diet  -Regular aerobic exercise  - rosuvastatin (CRESTOR) 20 MG tablet; Take 1 tablet by mouth daily  Dispense: 90 tablet; Refill: 1    3. Laryngopharyngeal reflux disease  -stable  -Continue same medications  -Decrease caffeine, avoid spicy foods, avoid tomato based foods  -Eat small meals instead of large meals  -Wait 2-3 hours after eating before lying down   - famotidine (PEPCID) 20 MG tablet; Take 2 tablets by mouth daily  Dispense: 180 tablet; Refill: 1    4. Vitamin D deficiency  -stable  -Continue vitamin D 50,000 IU once a week. Discussedmedications with patient, who voiced understanding of their use and indications. All questions answered. Return in about 4 months (around 12/26/2022) for hypertension, hyperlipidemia, and Laryngopharyngeal reflux.

## 2022-08-31 ASSESSMENT — ENCOUNTER SYMPTOMS
SINUS PRESSURE: 0
COUGH: 0
BLOOD IN STOOL: 0
CHEST TIGHTNESS: 0
RHINORRHEA: 0
DIARRHEA: 1
CONSTIPATION: 0
NAUSEA: 0
SHORTNESS OF BREATH: 0
WHEEZING: 0
SORE THROAT: 0
VOMITING: 0
ABDOMINAL PAIN: 0

## 2022-09-21 ENCOUNTER — TELEPHONE (OUTPATIENT)
Dept: ENDOCRINOLOGY | Age: 72
End: 2022-09-21

## 2022-09-21 ENCOUNTER — TELEPHONE (OUTPATIENT)
Dept: PRIMARY CARE CLINIC | Age: 72
End: 2022-09-21

## 2022-09-21 DIAGNOSIS — N76.0 VAGINITIS AND VULVOVAGINITIS: Primary | ICD-10-CM

## 2022-09-21 NOTE — TELEPHONE ENCOUNTER
Blood sugar logs    Fasting  Lunch   Dinner   Sept 11:  87  213   68  Sept 12: [address]  Sept 13: [address]   Sept 14: 172  97   150  Sept 15:  [address]: 195  202    -

## 2022-09-22 RX ORDER — FLUCONAZOLE 150 MG/1
150 TABLET ORAL ONCE
Qty: 1 TABLET | Refills: 0 | Status: SHIPPED | OUTPATIENT
Start: 2022-09-22 | End: 2022-09-22

## 2022-09-23 NOTE — TELEPHONE ENCOUNTER
Spoke with patient. She complains of vaginal itching and vaginal discharge for 2-3 days. Patient denies urinary symptoms. Patient used tea tree oil and Ph-D vaginal suppository which helped. Patient still has some vaginal itching. Recommend Diflucan 150mg po x 1. Prescription sent to patient's pharmacy. Declined

## 2022-09-26 DIAGNOSIS — I10 PRIMARY HYPERTENSION: ICD-10-CM

## 2022-09-26 DIAGNOSIS — I10 ESSENTIAL HYPERTENSION: ICD-10-CM

## 2022-09-26 DIAGNOSIS — E78.2 MIXED HYPERLIPIDEMIA: ICD-10-CM

## 2022-09-26 DIAGNOSIS — E66.01 CLASS 2 SEVERE OBESITY WITH SERIOUS COMORBIDITY AND BODY MASS INDEX (BMI) OF 37.0 TO 37.9 IN ADULT, UNSPECIFIED OBESITY TYPE (HCC): ICD-10-CM

## 2022-09-26 LAB
A/G RATIO: 1.2 (ref 1.1–2.2)
ALBUMIN SERPL-MCNC: 3.6 G/DL (ref 3.4–5)
ALP BLD-CCNC: 95 U/L (ref 40–129)
ALT SERPL-CCNC: 14 U/L (ref 10–40)
ANION GAP SERPL CALCULATED.3IONS-SCNC: 10 MMOL/L (ref 3–16)
AST SERPL-CCNC: 17 U/L (ref 15–37)
BILIRUB SERPL-MCNC: 0.3 MG/DL (ref 0–1)
BUN BLDV-MCNC: 23 MG/DL (ref 7–20)
CALCIUM SERPL-MCNC: 9.1 MG/DL (ref 8.3–10.6)
CHLORIDE BLD-SCNC: 102 MMOL/L (ref 99–110)
CHOLESTEROL, TOTAL: 132 MG/DL (ref 0–199)
CO2: 29 MMOL/L (ref 21–32)
CREAT SERPL-MCNC: 1.3 MG/DL (ref 0.6–1.2)
CREATININE URINE: 100.1 MG/DL (ref 28–259)
GFR AFRICAN AMERICAN: 49
GFR NON-AFRICAN AMERICAN: 40
GLUCOSE BLD-MCNC: 116 MG/DL (ref 70–99)
HDLC SERPL-MCNC: 40 MG/DL (ref 40–60)
LDL CHOLESTEROL CALCULATED: 71 MG/DL
MICROALBUMIN UR-MCNC: 14.7 MG/DL
MICROALBUMIN/CREAT UR-RTO: 146.9 MG/G (ref 0–30)
POTASSIUM SERPL-SCNC: 4.3 MMOL/L (ref 3.5–5.1)
SODIUM BLD-SCNC: 141 MMOL/L (ref 136–145)
T3 FREE: 2.5 PG/ML (ref 2.3–4.2)
T4 FREE: 1.1 NG/DL (ref 0.9–1.8)
TOTAL PROTEIN: 6.7 G/DL (ref 6.4–8.2)
TRIGL SERPL-MCNC: 106 MG/DL (ref 0–150)
TSH SERPL DL<=0.05 MIU/L-ACNC: 1.58 UIU/ML (ref 0.27–4.2)
VITAMIN D 25-HYDROXY: 51 NG/ML
VLDLC SERPL CALC-MCNC: 21 MG/DL

## 2022-09-27 LAB
ESTIMATED AVERAGE GLUCOSE: 197.3 MG/DL
HBA1C MFR BLD: 8.5 %

## 2022-09-30 ENCOUNTER — OFFICE VISIT (OUTPATIENT)
Dept: ENDOCRINOLOGY | Age: 72
End: 2022-09-30
Payer: MEDICARE

## 2022-09-30 VITALS
OXYGEN SATURATION: 98 % | TEMPERATURE: 98 F | HEART RATE: 80 BPM | DIASTOLIC BLOOD PRESSURE: 80 MMHG | WEIGHT: 228 LBS | HEIGHT: 66 IN | SYSTOLIC BLOOD PRESSURE: 142 MMHG | BODY MASS INDEX: 36.64 KG/M2 | RESPIRATION RATE: 14 BRPM

## 2022-09-30 DIAGNOSIS — E55.9 VITAMIN D DEFICIENCY: ICD-10-CM

## 2022-09-30 DIAGNOSIS — E11.65 UNCONTROLLED TYPE 2 DIABETES MELLITUS WITH HYPERGLYCEMIA (HCC): ICD-10-CM

## 2022-09-30 DIAGNOSIS — I10 PRIMARY HYPERTENSION: ICD-10-CM

## 2022-09-30 DIAGNOSIS — E66.01 CLASS 2 SEVERE OBESITY WITH SERIOUS COMORBIDITY AND BODY MASS INDEX (BMI) OF 36.0 TO 36.9 IN ADULT, UNSPECIFIED OBESITY TYPE (HCC): ICD-10-CM

## 2022-09-30 DIAGNOSIS — E78.2 MIXED HYPERLIPIDEMIA: ICD-10-CM

## 2022-09-30 DIAGNOSIS — N18.31 STAGE 3A CHRONIC KIDNEY DISEASE (HCC): ICD-10-CM

## 2022-09-30 DIAGNOSIS — E11.65 POORLY CONTROLLED TYPE 2 DIABETES MELLITUS WITH NEUROPATHY (HCC): ICD-10-CM

## 2022-09-30 DIAGNOSIS — E11.40 POORLY CONTROLLED TYPE 2 DIABETES MELLITUS WITH NEUROPATHY (HCC): ICD-10-CM

## 2022-09-30 PROBLEM — E66.812 CLASS 2 SEVERE OBESITY WITH SERIOUS COMORBIDITY AND BODY MASS INDEX (BMI) OF 36.0 TO 36.9 IN ADULT: Status: ACTIVE | Noted: 2022-09-30

## 2022-09-30 PROCEDURE — 1123F ACP DISCUSS/DSCN MKR DOCD: CPT | Performed by: INTERNAL MEDICINE

## 2022-09-30 PROCEDURE — 99215 OFFICE O/P EST HI 40 MIN: CPT | Performed by: INTERNAL MEDICINE

## 2022-09-30 PROCEDURE — 3052F HG A1C>EQUAL 8.0%<EQUAL 9.0%: CPT | Performed by: INTERNAL MEDICINE

## 2022-09-30 NOTE — PROGRESS NOTES
Amaya Fajardo is a 67 y.o. female who is being evaluated for Type 2 diabetes mellitus. H.    Current symptoms/problems include  hperglycemia  and are worsening. Type 2 diabetes mellitus, uncontrolled, with neuropathy [E11.40, E11.65]    Diagnosed with Type 2 diabetes mellitus in . Comorbid conditions:  Hypertension, hyperlipidemia, obesity, Neuropathy, Nephropathy and Retinopathy    Type 2 diabetes mellitus, uncontrolled, with neuropathy (HCC) [E11.40, E11.65]      Current diabetic medications include: Lantus 28 units twice daily, Humalog 7 units 3 times daily with meals, Trulicity 1.5 mg weekly  Correction scale of Humalo-200-2 units, 201-250-4 units, 251-300-6 units, >301 -units 8 units   Intolerance to diabetes medications: Yes Actos-leg swelling, Metformin     Weight trend: stable  Prior visit with dietician: yes  Current diet: on average, 2 meals per day  Current exercise: no     Current monitoring regimen: home blood tests - 4 times daily  Has brought blood glucose log/meter:  No  Home blood sugar records: fasting range: 100-130 and postprandial range: 100-130  Any episodes of hypoglycemia? No  Hypoglycemia frequency and time(s):    Does patient have Glucagon emergency kit? No  Does patient have rapid acting carbohydrate? No  Does patient wear a medic alert bracelet or necklace? No      2. Uncontrolled type 2 diabetes mellitus with diabetic nephropathy (HCC)  No urination problems     3. Type 2 diabetes, uncontrolled, with retinopathy ([address]  No vision changes     4. Vitamin D deficiency  No fatigue     5. Mixed hyperlipidemia  No muscle pain     6. Essential hypertension  No headaches     7. Obesity   Eats healthy     8.   CKD stage 3a  No urination problems          Past Medical History:   Diagnosis Date    Dizziness     Hearing loss     Hyperlipidemia     Hypertension     Laryngopharyngeal reflux disease 2019    Morbidly obese ([address] 10/29/2020    Sinusitis 2022 Tinnitus     Type II or unspecified type diabetes mellitus without mention of complication, not stated as uncontrolled     Uncontrolled type 2 diabetes mellitus with diabetic nephropathy ([address] 3/17/2021    Urinary, incontinence, stress female 2020    Vitamin D deficiency 2019      Patient Active Problem List   Diagnosis    HTN (hypertension)    Hyperlipidemia    Diabetes mellitus ([address]    Non-compliance with treatment    Diabetes mellitus type 2, uncontrolled ([address]    Bilateral impacted cerumen    Vitamin D deficiency    Laryngopharyngeal reflux disease    Cataract of both eyes    Age-related cataract of right eye    Urinary, incontinence, stress female    Uncontrolled type 2 diabetes mellitus with complication, with long-term current use of insulin ([address]    Morbidly obese (HCC)    Urinary tract infection without hematuria    Type 2 diabetes mellitus, uncontrolled, with neuropathy ([address]    Uncontrolled type 2 diabetes mellitus with diabetic nephropathy (HCC)    Type 2 diabetes, uncontrolled, with retinopathy ([address]    Hypertension    Class 2 obesity with body mass index (BMI) of 35.0 to 35.9 in adult    Dizziness    Chronic kidney disease (CKD)    Mixed hyperlipidemia    Stage 3a chronic kidney disease (HCC)    Pain of upper abdomen    Diarrhea    Nausea    Gas bloat syndrome    Belching    Balance disorder    Essential hypertension    Alopecia    Grief    MARIA DEL ROSARIO (stress urinary incontinence, female)    Vertigo    Left ear pain    Class 2 severe obesity with serious comorbidity and body mass index (BMI) of 36.0 to 36.9 in adult [facility])     Past Surgical History:   Procedure Laterality Date     SECTION      x 1     EYE SURGERY      right eye- cataract     TONSILLECTOMY       Social History     Socioeconomic History    Marital status: Single     Spouse name: Not on file    Number of children: Not on file    Years of education: Not on file    Highest education level: Not on file   Occupational History Occupation:  at [address] [place] [address] Use    Smoking status: Never    Smokeless tobacco: Never   Vaping Use    Vaping Use: Never used   Substance and Sexual Activity    Alcohol use: No    Drug use: No    Sexual activity: Not Currently   Other Topics Concern    Not on file   Social History Narrative    Not on file     Social Determinants of Health     Financial Resource Strain: Low Risk     Difficulty of Paying Living Expenses: Not hard at all   Food Insecurity: No Food Insecurity    Worried About Running Out of Food in the Last Year: Never true    Ran Out of Food in the Last Year: Never true   Transportation Needs: Not on file   Physical Activity: Inactive    Days of Exercise per Week: 0 days    Minutes of Exercise per Session: 0 min   Stress: Not on file   Social Connections: Not on file   Intimate Partner Violence: Not on file   Housing Stability: Not on file     Family History   Problem Relation Age of Onset    Diabetes Sister     Heart Disease Sister 77    Diabetes Brother         had amputation    High Blood Pressure Brother     Cancer Father      Current Outpatient Medications   Medication Sig Dispense Refill    rosuvastatin (CRESTOR) 20 MG tablet TAKE 1 TABLET BY MOUTH DAILY 90 tablet 1    losartan-hydroCHLOROthiazide (HYZAAR) 100-25 MG per tablet TAKE 1 TABLET BY MOUTH DAILY 90 tablet 1    famotidine (PEPCID) 20 MG tablet Take 2 tablets by mouth daily 180 tablet 1    rosuvastatin (CRESTOR) 20 MG tablet Take 1 tablet by mouth daily 90 tablet 1    losartan-hydroCHLOROthiazide (HYZAAR) 100-25 MG per tablet Take 1 tablet by mouth daily 90 tablet 1    HUMALOG KWIKPEN 100 UNIT/ML SOPN Inject 8 units TID with meals plus correction dose, total daily dose up to 60 units 30 pen 1    Insulin Pen Needle (B-D UF III MINI PEN NEEDLES) 31G X 5 MM MISC 1 each by Does not apply route 5 times daily 100 each 5    insulin glargine (LANTUS) 100 UNIT/ML injection vial Inject 32 Units into the skin 2 times daily 30 mL 3    Dulaglutide (TRULICITY) 3 KE/2.6OT SOPN Inject 3 mg into the skin once a week 4 pen 3    vitamin D (ERGOCALCIFEROL) 1.25 MG (75691 UT) CAPS capsule TAKE 1 CAPSULE BY MOUTH 1 TIME A WEEK 12 capsule 1    loratadine (CLARITIN) 10 MG tablet daily as needed       fluticasone (FLONASE) 50 MCG/ACT nasal spray SHAKE LIQUID AND USE 2 SPRAYS IN EACH NOSTRIL DAILY 48 g 0    blood glucose test strips (ACCU-CHEK ANJU PLUS) strip USE TO TEST BLOOD SUGAR THREE TIMES DAILY AS DIRECTED 300 strip 2    dorzolamide (TRUSOPT) 2 % ophthalmic solution 3 times daily       latanoprost (XALATAN) 0.005 % ophthalmic solution INSTILL 1 DROP IN BOTH EYES EVERY DAY AT BEDTIME      timolol (TIMOPTIC) 0.5 % ophthalmic solution PUT 1 DROP INTO BOTH EYES THREE TIMES DAILY      Insulin Syringe-Needle U-100 (INSULIN SYRINGE 1CC/31GX5/16\") 31G X 5/16\" 1 ML MISC Use five times daily. 500 each 3    hydrocortisone 2.5 % cream APPLY EXTERNALLY TO THE AFFECTED AREA TWICE DAILY (Patient taking differently: 2 times daily as needed APPLY EXTERNALLY TO THE AFFECTED AREA TWICE DAILY) 30 g 0    Blood Pressure Monitoring (BLOOD PRESSURE MONITOR/L CUFF) MISC Use to monitor blood pressure 1 each 0    ciclopirox (LOPROX) 0.77 % cream APPLY TO GREAT TOES DAILY      urea (CARMOL) 20 % cream Apply topically as needed      UNKNOWN TO PATIENT Yellow and orange capped eye drops for BP in eye      ACCU-CHEK SOFTCLIX LANCETS MISC USE FOUR TIMES DAILY 300 each 6    Blood Glucose Monitoring Suppl (ACCU-CHEK ANJU PLUS) W/DEVICE KIT Use to monitor blood sugars 1 kit 0     No current facility-administered medications for this visit.      Allergies   Allergen Reactions    Januvia [Sitagliptin Phosphate] Swelling     Side effects    Actos [Pioglitazone] Diarrhea    Avandia [Rosiglitazone] Diarrhea and Nausea Only    Dye [Iodides] Rash    Jardiance [Empagliflozin] Diarrhea    Metformin Diarrhea     Nausea       Family Status   Relation Name Status    Sister  Alive Brother  Alive    Mother   at age 28        unknown    Father   at age 39        lung       Lab Review:    Lab Results   Component Value Date/Time    WBC 10.3 2022 09:02 AM    HGB 10.5 2022 09:02 AM    HCT 30.8 2022 09:02 AM    MCV 90.3 2022 09:02 AM     2022 09:02 AM     Lab Results   Component Value Date/Time     2022 11:19 AM    K 4.3 2022 11:19 AM     2022 11:19 AM    CO2 29 2022 11:19 AM    BUN 23 2022 11:19 AM    CREATININE 1.3 2022 11:19 AM    GLUCOSE 116 2022 11:19 AM    CALCIUM 9.1 2022 11:19 AM    PROT 6.7 2022 11:19 AM    PROT 7.1 2012 11:14 AM    LABALBU 3.6 2022 11:19 AM    BILITOT 0.3 2022 11:19 AM    ALKPHOS 95 2022 11:19 AM    AST 17 2022 11:19 AM    ALT 14 2022 11:19 AM    LABGLOM 40 2022 11:19 AM    GFRAA 49 2022 11:19 AM    GFRAA >60 2013 03:06 PM    AGRATIO 1.2 2022 11:19 AM    GLOB 3.0 10/15/2021 08:19 AM     Lab Results   Component Value Date/Time    TSH 1.58 2022 11:19 AM    FT3 2.5 2022 11:19 AM     Lab Results   Component Value Date/Time    LABA1C 8.5 2022 11:19 AM     Lab Results   Component Value Date/Time    .3 2022 11:19 AM     Lab Results   Component Value Date/Time    CHOL 132 2022 11:19 AM     Lab Results   Component Value Date/Time    TRIG 106 2022 11:19 AM     Lab Results   Component Value Date/Time    HDL 40 2022 11:19 AM    HDL 43 2012 11:14 AM     Lab Results   Component Value Date/Time    LDLCALC 71 2022 11:19 AM     Lab Results   Component Value Date/Time    LABVLDL 21 2022 11:19 AM     No results found for: [facility]  Lab Results   Component Value Date/Time    LABMICR 14.70 2022 11:28 AM    LABMICR YES 2022 09:02 AM     Lab Results   Component Value Date/Time    VITD25 51.0 2022 11:19 AM        Review of Systems:  Constitutional: no fatigue, no fever, no recent weight gain, no recent weight loss, no changes in appetite  Eyes: no eye pain, no change in vision, no eye redness, no eye irritation, no double vision  Ears, nose, throat: no nasal congestion, no sore throat, no earache, no decrease in hearing, no hoarseness, no dry mouth, no sinus problems, no difficulty swallowing, no neck lumps, no dental problems, no mouth sores, no ringing in ears  Pulmonary: no shortness of breath, no wheezing, no dyspnea on exertion, no cough  Cardiovascular: no chest pain, no lower extremity edema, no orthopnea, no intermittent leg claudication, no palpitations  Gastrointestinal: no abdominal pain, no nausea, no vomiting, no diarrhea, no constipation, no dysphagia, no heartburn, no bloating  Genitourinary: no dysuria, no urinary incontinence, no urinary hesitancy, no urinary frequency, no feelings of urinary urgency, no nocturia  Musculoskeletal: no joint swelling, no joint stiffness, no joint pain, no muscle cramps, no muscle pain, no bone pain  Integument/Breast: no hair loss, no skin rashes, no skin lesions, no itching, no dry skin  Neurological: no numbness, no tingling, no weakness, no confusion, no headaches, no dizziness, no fainting, no tremors, no decrease in memory, no balance problems  Psychiatric: no anxiety, no depression, no insomnia  Hematologic/Lymphatic: no tendency for easy bleeding, no swollen lymph nodes, no tendency for easy bruising  Immunology: no seasonal allergies, no frequent infections, no frequent illnesses  Endocrine: no temperature intolerance    BP (!) 142/80   Pulse 80   Temp 98 °F (36.7 °C)   Resp 14   Ht 5' 6\" (1.676 m)   Wt 228 lb (103.4 kg)   SpO2 98%   BMI 36.80 kg/m²    Wt Readings from Last 3 Encounters:   09/30/22 228 lb (103.4 kg)   08/25/22 227 lb 3.2 oz (103.1 kg)   06/24/22 230 lb (104.3 kg)     Body mass index is 36.8 kg/m².       OBJECTIVE:  Constitutional: no acute distress, well appearing and well nourished  Psychiatric: oriented to person, place and time, judgement and insight and normal, recent and remote memory and intact and mood and affect are normal  Skin: skin and subcutaneous tissue is normal without mass, normal turgor  Head and Face: examination of head and face revealed no abnormalities  Eyes: no lid or conjunctival swelling, erythema or discharge, pupils are normal, equal, round, reactive to light  Ears/Nose: external inspection of ears and nose revealed no abnormalities, hearing is grossly normal  Oropharynx/Mouth/Face: lips, tongue and gums are normal with no lesions, the voice quality was normal  Neck: neck is supple and symmetric, with midline trachea and no masses, thyroid is normal  Lymphatics: normal cervical lymph nodes, normal supraclavicular nodes  Pulmonary: no increased work of breathing or signs of respiratory distress, lungs are clear to auscultation  Cardiovascular: normal heart rate and rhythm, normal S1 and S2, no murmurs and pedal pulses and 2+ bilaterally, No edema  Abdomen: abdomen is soft, non-tender with no masses  Musculoskeletal: normal gait and station and exam of the digits and nails are normal  Neurological: normal coordination and normal general cortical function    Visual inspection:  Deformity/amputation: present - hammer toes  Skin lesions/pre-ulcerative calluses: absent  Edema: right- negative, left- negative     Sensory exam:  Monofilament sensation: normal  (minimum of 5 random plantar locations tested, avoiding callused areas - > 1 area with absence of sensation is + for neuropathy)     Plus at least one of the following:  Pulses: normal  Proprioception: Intact  Vibration (128 Hz): Impaired    ASSESSMENT/PLAN:    1. Type 2 diabetes mellitus, uncontrolled, with neuropathy ([address]  Start Farxiga samples.  Recommended by Nephrologist.  [address] on CGM  Hemoglobin A1c 9.4-9.8-8.5  Send blood glucose records for review  Current diabetic medications include: Lantus 32 units twice daily, Humalog 8 units 3 times daily with meals, Trulicity 3 mg weekly  Correction scale of Humalo-200-2 units, 201-250-4 units, 251-300-6 units, >301 -units 8 units   - GABAPENTIN PO; Take by mouth nightly  - Hemoglobin A1C; Future  - Lipid Panel; Future  - Microalbumin / Creatinine Urine Ratio; Future  - Comprehensive Metabolic Panel; Future     2. Uncontrolled type 2 diabetes mellitus with diabetic nephropathy ([facility]  Continue monitoring  Microalbumin creatinine ratio 81.6-99.8-146.9  - Hemoglobin A1C; Future  - Lipid Panel; Future  - Microalbumin / Creatinine Urine Ratio; Future  - Comprehensive Metabolic Panel; Future     3. Type 2 diabetes, uncontrolled, with retinopathy ([facility]  Follow with ophthalmology     4. Vitamin D deficiency  25OH vitamin D 47-42.7-39.3-45-51  Vitamin D 50,000 international units weekly  - Vitamin D 25 Hydroxy; Future     5. Mixed hyperlipidemia  LDL 59-64-71  Triglycerides 122-131-106  HDL 37-36-40  Rosuvastatin 20 mg daily  - Lipid Panel; Future     6. Essential hypertension  Verapamil, losartan/hydrochlorthiazide     7. Obesity  Diet, exercise     8.   CKD stage 3a  Creatinine 1.3-1.5-1.3  GFR 49-41-49        Reviewed and/or ordered clinical lab results Yes  Reviewed and/or ordered radiology tests Yes MRI brain  Reviewed and/or ordered other diagnostic tests No  Discussed test results with performing physician No  Independently reviewed image, tracing, or specimen No  Made a decision to obtain old records No  Reviewed and summarized old records Yes   25OH VITAMIN D 47  Hemoglobin A1c 7.8  Hypertension  Hyperlipidemia  Obtained history from other than patient No    Dae Bhavesh was counseled regarding symptoms of diabetes, hyperlipidemia, hypertension diagnosis, course and complications of disease if inadequately treated, side effects of medications, diagnosis, treatment options, and prognosis, risks, benefits, complications, and alternatives of treatment, labs, imaging and other studies and treatment targets and goals, insulin correction scale, prandial injections. She understands instructions and counseling. These diagnosis were discussed and reviewed with the patient including the advantages of drug therapy. She was counseled at this visit on the following: diabetes complication prevention and foot care. Total time I spent for this encounter 40 minutes    Return in about 3 months (around 12/30/2022) for diabetes.     Electronically signed by Mariel Rehman MD on 9/30/2022 at 10:14 PM

## 2022-10-25 RX ORDER — DULAGLUTIDE 3 MG/.5ML
INJECTION, SOLUTION SUBCUTANEOUS
Qty: 2 ML | Refills: 2 | Status: SHIPPED | OUTPATIENT
Start: 2022-10-25

## 2022-11-23 ENCOUNTER — TELEPHONE (OUTPATIENT)
Dept: ENDOCRINOLOGY | Age: 72
End: 2022-11-23

## 2022-11-23 ENCOUNTER — TELEPHONE (OUTPATIENT)
Dept: GYNECOLOGY | Age: 72
End: 2022-11-23

## 2022-11-23 DIAGNOSIS — E11.65 POORLY CONTROLLED TYPE 2 DIABETES MELLITUS WITH NEUROPATHY (HCC): ICD-10-CM

## 2022-11-23 DIAGNOSIS — E11.40 POORLY CONTROLLED TYPE 2 DIABETES MELLITUS WITH NEUROPATHY (HCC): ICD-10-CM

## 2022-11-23 RX ORDER — BLOOD SUGAR DIAGNOSTIC
STRIP MISCELLANEOUS
Qty: 300 STRIP | Refills: 2 | Status: SHIPPED | OUTPATIENT
Start: 2022-11-23

## 2022-11-23 RX ORDER — BLOOD-GLUCOSE METER
EACH MISCELLANEOUS
Qty: 1 KIT | Refills: 0 | Status: SHIPPED | OUTPATIENT
Start: 2022-11-23

## 2022-11-23 RX ORDER — LANCETS
EACH MISCELLANEOUS
Qty: 300 EACH | Refills: 6 | Status: SHIPPED | OUTPATIENT
Start: 2022-11-23

## 2022-11-23 NOTE — TELEPHONE ENCOUNTER
Pt calling and states her meter is very old and will need a new one. Her insurance covers [employer].  She will need the meter, test strips & lancets to be sent to [place] on [facility]    CB# [phone]

## 2022-11-23 NOTE — TELEPHONE ENCOUNTER
Patients phone number [phone]        Patient has been itching for  2 weeks in her vaginal area, and wants an emergency slot to come in . Patient was advised  is out until Monday and has patients scheduled and will call her soon as she can. Patient was last seen 2018 for Well women visit. She was advised she would be considered a new patient and explained to her Dr. Jen Seth would have to advise on what to do.  Told her someone would reach out to her soon as we could               [address], [address] [place] [address] 61 - F [phone]

## 2022-11-29 RX ORDER — INSULIN GLARGINE 100 [IU]/ML
INJECTION, SOLUTION SUBCUTANEOUS
Qty: 30 ML | Refills: 0 | Status: SHIPPED | OUTPATIENT
Start: 2022-11-29 | End: 2023-01-24 | Stop reason: SDUPTHER

## 2022-12-05 DIAGNOSIS — L30.9 DERMATITIS OF EXTERNAL EAR: ICD-10-CM

## 2022-12-05 DIAGNOSIS — E55.9 VITAMIN D DEFICIENCY: ICD-10-CM

## 2022-12-05 RX ORDER — ERGOCALCIFEROL 1.25 MG/1
CAPSULE ORAL
Qty: 12 CAPSULE | Refills: 1 | Status: SHIPPED | OUTPATIENT
Start: 2022-12-05

## 2022-12-05 NOTE — TELEPHONE ENCOUNTER
Pt is requesting a refill    vitamin D (ERGOCALCIFEROL) 1.25 MG (17150 UT) CAPS capsule    hydrocortisone 2.5 % cream      [facility] #[id number] [address]   [address] [facility], [address] [place] [id number]   Phone:  [phone]  Fax:  [phone]

## 2022-12-05 NOTE — TELEPHONE ENCOUNTER
Medication:   Requested Prescriptions     Pending Prescriptions Disp Refills    vitamin D (ERGOCALCIFEROL) 1.25 MG (17870 UT) CAPS capsule 12 capsule 1    hydrocortisone 2.5 % cream 30 g 0     Sig: APPLY EXTERNALLY TO THE AFFECTED AREA TWICE DAILY     Last Filled:  5.17.22 7.15.21    Last appt: 8/25/2022   Next appt: 12/27/2022    Last OARRS: No flowsheet data found.

## 2022-12-06 DIAGNOSIS — E55.9 VITAMIN D DEFICIENCY: ICD-10-CM

## 2022-12-06 RX ORDER — ERGOCALCIFEROL 1.25 MG/1
CAPSULE ORAL
Qty: 12 CAPSULE | Refills: 1 | OUTPATIENT
Start: 2022-12-06

## 2022-12-06 NOTE — TELEPHONE ENCOUNTER
Medication:   Requested Prescriptions     Pending Prescriptions Disp Refills    vitamin D (ERGOCALCIFEROL) 1.25 MG (31716 UT) CAPS capsule [Pharmacy Med Name: VITAMIN D2 50,000IU (ERGO) CAP RX] 12 capsule 1     Sig: TAKE 1 CAPSULE BY MOUTH 1 TIME A WEEK        Last Filled:      Patient Phone Number: [phone] (home)     Last appt: 8/25/2022   Next appt: 12/27/2022    Last OARRS: No flowsheet data found. Preferred Pharmacy:   [facility] [address] [place] [address], [address] [facility]  [id number] [place] [id number]  Phone: [phone] Fax: [phone]    Medication:   Requested Prescriptions     Pending Prescriptions Disp Refills    vitamin D (ERGOCALCIFEROL) 1.25 MG (65301 UT) CAPS capsule [Pharmacy Med Name: VITAMIN D2 50,000IU (ERGO) CAP RX] 12 capsule 1     Sig: TAKE 1 CAPSULE BY MOUTH 1 TIME A WEEK        Last Filled:      Patient Phone Number: [phone] (home)     Last appt: 8/25/2022   Next appt: 12/27/2022    Last OARRS: No flowsheet data found.     Preferred Pharmacy:   [address], [address] [facility]  [address] [address] [address]  Phone: [phone] Fax: [phone]

## 2023-01-20 DIAGNOSIS — E78.2 MIXED HYPERLIPIDEMIA: ICD-10-CM

## 2023-01-20 DIAGNOSIS — E55.9 VITAMIN D DEFICIENCY: ICD-10-CM

## 2023-01-20 DIAGNOSIS — I10 PRIMARY HYPERTENSION: ICD-10-CM

## 2023-01-20 LAB
A/G RATIO: 1.4 (ref 1.1–2.2)
ALBUMIN SERPL-MCNC: 3.9 G/DL (ref 3.4–5)
ALP BLD-CCNC: 110 U/L (ref 40–129)
ALT SERPL-CCNC: 14 U/L (ref 10–40)
ANION GAP SERPL CALCULATED.3IONS-SCNC: 14 MMOL/L (ref 3–16)
AST SERPL-CCNC: 16 U/L (ref 15–37)
BILIRUB SERPL-MCNC: <0.2 MG/DL (ref 0–1)
BUN BLDV-MCNC: 25 MG/DL (ref 7–20)
CALCIUM SERPL-MCNC: 9.2 MG/DL (ref 8.3–10.6)
CHLORIDE BLD-SCNC: 100 MMOL/L (ref 99–110)
CHOLESTEROL, FASTING: 121 MG/DL (ref 0–199)
CO2: 25 MMOL/L (ref 21–32)
CREAT SERPL-MCNC: 1.6 MG/DL (ref 0.6–1.2)
CREATININE URINE: 224.1 MG/DL (ref 28–259)
ESTIMATED AVERAGE GLUCOSE: 214.5 MG/DL
GFR SERPL CREATININE-BSD FRML MDRD: 34 ML/MIN/{1.73_M2}
GLUCOSE BLD-MCNC: 183 MG/DL (ref 70–99)
HBA1C MFR BLD: 9.1 %
HDLC SERPL-MCNC: 39 MG/DL (ref 40–60)
LDL CHOLESTEROL CALCULATED: 55 MG/DL
MICROALBUMIN UR-MCNC: 28.5 MG/DL
MICROALBUMIN/CREAT UR-RTO: 127.2 MG/G (ref 0–30)
POTASSIUM SERPL-SCNC: 3.8 MMOL/L (ref 3.5–5.1)
SODIUM BLD-SCNC: 139 MMOL/L (ref 136–145)
TOTAL PROTEIN: 6.7 G/DL (ref 6.4–8.2)
TRIGLYCERIDE, FASTING: 137 MG/DL (ref 0–150)
VITAMIN D 25-HYDROXY: 53.7 NG/ML
VLDLC SERPL CALC-MCNC: 27 MG/DL

## 2023-01-24 ENCOUNTER — TELEMEDICINE (OUTPATIENT)
Dept: ENDOCRINOLOGY | Age: 73
End: 2023-01-24
Payer: MEDICARE

## 2023-01-24 DIAGNOSIS — E55.9 VITAMIN D DEFICIENCY: ICD-10-CM

## 2023-01-24 DIAGNOSIS — I10 ESSENTIAL HYPERTENSION: ICD-10-CM

## 2023-01-24 DIAGNOSIS — E11.21 DIABETIC NEPHROPATHY ASSOCIATED WITH TYPE 2 DIABETES MELLITUS (HCC): ICD-10-CM

## 2023-01-24 DIAGNOSIS — E11.65 POORLY CONTROLLED TYPE 2 DIABETES MELLITUS WITH RETINOPATHY (HCC): ICD-10-CM

## 2023-01-24 DIAGNOSIS — E11.65 POORLY CONTROLLED TYPE 2 DIABETES MELLITUS WITH NEUROPATHY (HCC): Primary | ICD-10-CM

## 2023-01-24 DIAGNOSIS — N18.30 STAGE 3 CHRONIC KIDNEY DISEASE, UNSPECIFIED WHETHER STAGE 3A OR 3B CKD (HCC): ICD-10-CM

## 2023-01-24 DIAGNOSIS — E78.2 MIXED HYPERLIPIDEMIA: ICD-10-CM

## 2023-01-24 DIAGNOSIS — E11.40 POORLY CONTROLLED TYPE 2 DIABETES MELLITUS WITH NEUROPATHY (HCC): Primary | ICD-10-CM

## 2023-01-24 DIAGNOSIS — E11.319 POORLY CONTROLLED TYPE 2 DIABETES MELLITUS WITH RETINOPATHY (HCC): ICD-10-CM

## 2023-01-24 DIAGNOSIS — E66.01 CLASS 2 SEVERE OBESITY WITH SERIOUS COMORBIDITY AND BODY MASS INDEX (BMI) OF 36.0 TO 36.9 IN ADULT, UNSPECIFIED OBESITY TYPE (HCC): ICD-10-CM

## 2023-01-24 PROCEDURE — 99214 OFFICE O/P EST MOD 30 MIN: CPT | Performed by: INTERNAL MEDICINE

## 2023-01-24 PROCEDURE — 1123F ACP DISCUSS/DSCN MKR DOCD: CPT | Performed by: INTERNAL MEDICINE

## 2023-01-24 PROCEDURE — 3046F HEMOGLOBIN A1C LEVEL >9.0%: CPT | Performed by: INTERNAL MEDICINE

## 2023-01-24 RX ORDER — DULAGLUTIDE 3 MG/.5ML
INJECTION, SOLUTION SUBCUTANEOUS
Qty: 2 ML | Refills: 3 | Status: SHIPPED | OUTPATIENT
Start: 2023-01-24

## 2023-01-24 RX ORDER — INSULIN GLARGINE 100 [IU]/ML
INJECTION, SOLUTION SUBCUTANEOUS
Qty: 30 ML | Refills: 3 | Status: SHIPPED | OUTPATIENT
Start: 2023-01-24

## 2023-01-24 RX ORDER — INSULIN LISPRO 100 [IU]/ML
INJECTION, SOLUTION INTRAVENOUS; SUBCUTANEOUS
Qty: 30 ADJUSTABLE DOSE PRE-FILLED PEN SYRINGE | Refills: 1 | Status: SHIPPED | OUTPATIENT
Start: 2023-01-24

## 2023-01-24 RX ORDER — CALCIUM CARB/VITAMIN D3/VIT K1 500-100-40
TABLET,CHEWABLE ORAL
Qty: 500 EACH | Refills: 3 | Status: SHIPPED | OUTPATIENT
Start: 2023-01-24

## 2023-01-24 NOTE — PROGRESS NOTES
David Grant is a 67 y.o. female who is being evaluated for Type 2 diabetes mellitus. H.    2023    TELEHEALTH EVALUATION -- Audio/Visual (During BMVPO-38 public health emergency)    Patient provided verbal consent to use the video visit. HPI:    David Grant (:  1950) has requested an audio/video evaluation for the following concern(s):      Current symptoms/problems include  hperglycemia  and are worsening. Poorly controlled type 2 diabetes mellitus with neuropathy ([address] [E11.40, E11.65]    Diagnosed with Type 2 diabetes mellitus in . Comorbid conditions:  Hypertension, hyperlipidemia, obesity, Neuropathy, Nephropathy and Retinopathy    Current diabetic medications include: Lantus 28 units twice daily, Humalog 7 units 3 times daily with meals, Trulicity 1.5 mg weekly  Correction scale of Humalo-200-2 units, 201-250-4 units, 251-300-6 units, >301 -units 8 units   Intolerance to diabetes medications: Yes Actos-leg swelling, Metformin  Has tendency to have UTIs and yeast infections  Weight trend: stable  Prior visit with dietician: yes  Current diet: on average, 2 meals per day  Current exercise: no     Current monitoring regimen: home blood tests - 4 times daily  Has brought blood glucose log/meter:  No  Home blood sugar records: fasting range: 100-130 and postprandial range: 100-130  Any episodes of hypoglycemia? No  Hypoglycemia frequency and time(s):    Does patient have Glucagon emergency kit? No  Does patient have rapid acting carbohydrate? No  Does patient wear a medic alert bracelet or necklace? No      2. Diabetic nephropathy (HCC)  No urination problems     3. Type 2 diabetes, poorly controlled, with retinopathy ([address]  No vision changes     4. Vitamin D deficiency  No fatigue     5. Mixed hyperlipidemia  No muscle pain     6. Essential hypertension  No headaches     7. Obesity   Eats healthy     8.   CKD stage unspecified stage  No urination problems Past Medical History:   Diagnosis Date    Dizziness     Hearing loss     Hyperlipidemia     Hypertension     Laryngopharyngeal reflux disease 2019    Morbidly obese ([address] 10/29/2020    Sinusitis 2022    Tinnitus     Type II or unspecified type diabetes mellitus without mention of complication, not stated as uncontrolled     Uncontrolled type 2 diabetes mellitus with diabetic nephropathy 3/17/2021    Urinary, incontinence, stress female 2020    Vitamin D deficiency 2019      Patient Active Problem List   Diagnosis    HTN (hypertension)    Hyperlipidemia    Diabetes mellitus ([address]    Non-compliance with treatment    Diabetes mellitus type 2, uncontrolled    Bilateral impacted cerumen    Vitamin D deficiency    Laryngopharyngeal reflux disease    Cataract of both eyes    Age-related cataract of right eye    Urinary, incontinence, stress female    Morbidly obese ([address]    Urinary tract infection without hematuria    Hypertension    Class 2 obesity with body mass index (BMI) of 35.0 to 35.9 in adult    Dizziness    Chronic kidney disease (CKD)    Mixed hyperlipidemia    Stage 3a chronic kidney disease (HCC)    Pain of upper abdomen    Diarrhea    Nausea    Gas bloat syndrome    Belching    Balance disorder    Essential hypertension    Alopecia    Grief    MARIA DEL ROSARIO (stress urinary incontinence, female)    Vertigo    Left ear pain    Class 2 severe obesity with serious comorbidity and body mass index (BMI) of 36.0 to 36.9 in adult [facility])    Poorly controlled type 2 diabetes mellitus with retinopathy ([address]    Diabetic nephropathy associated with type 2 diabetes mellitus ([address]    Poorly controlled type 2 diabetes mellitus with neuropathy ([address]     Past Surgical History:   Procedure Laterality Date     SECTION      x 1     EYE SURGERY      right eye- cataract     TONSILLECTOMY       Social History     Socioeconomic History    Marital status: Single     Spouse name: Not on file    Number of children: Not on file    Years of education: Not on file    Highest education level: Not on file   Occupational History    Occupation:  at [address] [place] [address] Use    Smoking status: Never    Smokeless tobacco: Never   Vaping Use    Vaping Use: Never used   Substance and Sexual Activity    Alcohol use: No    Drug use: No    Sexual activity: Not Currently   Other Topics Concern    Not on file   Social History Narrative    Not on file     Social Determinants of Health     Financial Resource Strain: Not on file   Food Insecurity: Not on file   Transportation Needs: Not on file   Physical Activity: Inactive    Days of Exercise per Week: 0 days    Minutes of Exercise per Session: 0 min   Stress: Not on file   Social Connections: Not on file   Intimate Partner Violence: Not on file   Housing Stability: Not on file     Family History   Problem Relation Age of Onset    Diabetes Sister     Heart Disease Sister 77    Diabetes Brother         had amputation    High Blood Pressure Brother     Cancer Father      Current Outpatient Medications   Medication Sig Dispense Refill    vitamin D (ERGOCALCIFEROL) 1.25 MG (81079 UT) CAPS capsule TAKE 1 CAPSULE BY MOUTH 1 TIME A WEEK 12 capsule 1    hydrocortisone 2.5 % cream APPLY EXTERNALLY TO THE AFFECTED AREA TWICE DAILY 30 g 0    LANTUS 100 UNIT/ML injection vial INJECT 32 UNITS UNDER THE SKIN TWICE DAILY 30 mL 0    blood glucose test strips (ACCU-CHEK ANJU PLUS) strip USE TO TEST BLOOD SUGAR THREE TIMES DAILY AS DIRECTED 300 strip 2    Blood Glucose Monitoring Suppl (ACCU-CHEK ANJU PLUS) w/Device KIT Use to monitor blood sugars 1 kit 0    Accu-Chek Softclix Lancets MISC USE FOUR TIMES DAILY 056 each 6    TRULICITY 3 GS/5.1KD SOPN INJECT 3 MG UNDER THE SKIN ONCE A WEEK 2 mL 2    rosuvastatin (CRESTOR) 20 MG tablet TAKE 1 TABLET BY MOUTH DAILY 90 tablet 1    losartan-hydroCHLOROthiazide (HYZAAR) 100-25 MG per tablet TAKE 1 TABLET BY MOUTH DAILY 90 tablet 1    famotidine (PEPCID) 20 MG tablet Take 2 tablets by mouth daily 180 tablet 1    rosuvastatin (CRESTOR) 20 MG tablet Take 1 tablet by mouth daily 90 tablet 1    losartan-hydroCHLOROthiazide (HYZAAR) 100-25 MG per tablet Take 1 tablet by mouth daily 90 tablet 1    HUMALOG KWIKPEN 100 UNIT/ML SOPN Inject 8 units TID with meals plus correction dose, total daily dose up to 60 units 30 pen 1    Insulin Pen Needle (B-D UF III MINI PEN NEEDLES) 31G X 5 MM MISC 1 each by Does not apply route 5 times daily 100 each 5    loratadine (CLARITIN) 10 MG tablet daily as needed       fluticasone (FLONASE) 50 MCG/ACT nasal spray SHAKE LIQUID AND USE 2 SPRAYS IN EACH NOSTRIL DAILY 48 g 0    dorzolamide (TRUSOPT) 2 % ophthalmic solution 3 times daily       latanoprost (XALATAN) 0.005 % ophthalmic solution INSTILL 1 DROP IN BOTH EYES EVERY DAY AT BEDTIME      timolol (TIMOPTIC) 0.5 % ophthalmic solution PUT 1 DROP INTO BOTH EYES THREE TIMES DAILY      Insulin Syringe-Needle U-100 (INSULIN SYRINGE 1CC/31GX5/16\") 31G X 5/16\" 1 ML MISC Use five times daily. 500 each 3    Blood Pressure Monitoring (BLOOD PRESSURE MONITOR/L CUFF) MISC Use to monitor blood pressure 1 each 0    ciclopirox (LOPROX) 0.77 % cream APPLY TO GREAT TOES DAILY      urea (CARMOL) 20 % cream Apply topically as needed      UNKNOWN TO PATIENT Yellow and orange capped eye drops for BP in eye      dapagliflozin (FARXIGA) 10 MG tablet Take 0.5 tablets by mouth every morning (Patient not taking: Reported on 2023) 30 tablet 0     No current facility-administered medications for this visit.      Allergies   Allergen Reactions    Januvia [Sitagliptin Phosphate] Swelling     Side effects    Actos [Pioglitazone] Diarrhea    Avandia [Rosiglitazone] Diarrhea and Nausea Only    Dye [Iodides] Rash    Jardiance [Empagliflozin] Diarrhea    Metformin Diarrhea     Nausea       Family Status   Relation Name Status    Sister  Alive    Brother  Alive    Mother   at age 28        unknown Father   at age 39        lung       Lab Review:    Lab Results   Component Value Date/Time    WBC 10.3 2022 09:02 AM    HGB 10.5 2022 09:02 AM    HCT 30.8 2022 09:02 AM    MCV 90.3 2022 09:02 AM     2022 09:02 AM     Lab Results   Component Value Date/Time     2023 08:58 AM    K 3.8 2023 08:58 AM     2023 08:58 AM    CO2 25 2023 08:58 AM    BUN 25 2023 08:58 AM    CREATININE 1.6 2023 08:58 AM    GLUCOSE 183 2023 08:58 AM    CALCIUM 9.2 2023 08:58 AM    PROT 6.7 2023 08:58 AM    PROT 7.1 2012 11:14 AM    LABALBU 3.9 2023 08:58 AM    BILITOT <0.2 2023 08:58 AM    ALKPHOS 110 2023 08:58 AM    AST 16 2023 08:58 AM    ALT 14 2023 08:58 AM    LABGLOM 34 2023 08:58 AM    GFRAA 49 2022 11:19 AM    GFRAA >60 2013 03:06 PM    AGRATIO 1.4 2023 08:58 AM    GLOB 3.0 10/15/2021 08:19 AM     Lab Results   Component Value Date/Time    TSH 1.58 2022 11:19 AM    FT3 2.5 2022 11:19 AM     Lab Results   Component Value Date/Time    LABA1C 9.1 2023 08:58 AM     Lab Results   Component Value Date/Time    .5 2023 08:58 AM     Lab Results   Component Value Date/Time    CHOL 132 2022 11:19 AM     Lab Results   Component Value Date/Time    TRIG 106 2022 11:19 AM     Lab Results   Component Value Date/Time    HDL 39 2023 08:58 AM    HDL 43 2012 11:14 AM     Lab Results   Component Value Date/Time    LDLCALC 55 2023 08:58 AM     Lab Results   Component Value Date/Time    LABVLDL 27 2023 08:58 AM     No results found for: CHOLHDLRATIO  Lab Results   Component Value Date/Time    LABMICR 28.50 2023 09:39 AM    LABMICR YES 2022 09:02 AM     Lab Results   Component Value Date/Time    VITD25 53.7 2023 08:58 AM        Review of Systems:  Constitutional: no fatigue, no fever, no recent weight gain, no recent weight loss, no changes in appetite  Eyes: no eye pain, no change in vision, no eye redness, no eye irritation, no double vision  Ears, nose, throat: no nasal congestion, no sore throat, no earache, no decrease in hearing, no hoarseness, no dry mouth, no sinus problems, no difficulty swallowing, no neck lumps, no dental problems, no mouth sores, no ringing in ears  Pulmonary: no shortness of breath, no wheezing, no dyspnea on exertion, no cough  Cardiovascular: no chest pain, no lower extremity edema, no orthopnea, no intermittent leg claudication, no palpitations  Gastrointestinal: no abdominal pain, no nausea, no vomiting, no diarrhea, no constipation, no dysphagia, no heartburn, no bloating  Genitourinary: no dysuria, no urinary incontinence, no urinary hesitancy, no urinary frequency, no feelings of urinary urgency, no nocturia  Musculoskeletal: no joint swelling, no joint stiffness, no joint pain, no muscle cramps, no muscle pain, no bone pain  Integument/Breast: no hair loss, no skin rashes, no skin lesions, no itching, no dry skin  Neurological: no numbness, no tingling, no weakness, no confusion, no headaches, no dizziness, no fainting, no tremors, no decrease in memory, no balance problems  Psychiatric: no anxiety, no depression, no insomnia  Hematologic/Lymphatic: no tendency for easy bleeding, no swollen lymph nodes, no tendency for easy bruising  Immunology: no seasonal allergies, no frequent infections, no frequent illnesses  Endocrine: no temperature intolerance    There were no vitals taken for this visit. Wt Readings from Last 3 Encounters:   09/30/22 228 lb (103.4 kg)   08/25/22 227 lb 3.2 oz (103.1 kg)   06/24/22 230 lb (104.3 kg)     There is no height or weight on file to calculate BMI.     OBJECTIVE:  Constitutional: no apparent distress, well developed and well nourished  Mental status: alert and awake, oriented to person, place and time, able to follow commands  Psychiatric: judgement and insight and normal, recent and remote memory are intact, mood and affect are normal  Skin: skin inspection appears normal, no significant exanthematous lesions or discoloration noted on facial skin  Head and Face: head and face inspection revealed no abnormalities, normocephalic, atraumatic  Eyes: no lid or conjunctival swelling, erythema or discharge, sclera appears normal  Ears/Nose: external inspection of ears and nose revealed no abnormalities, hearing is grossly normal  Oropharynx/Mouth/Face: lips are normal with no lesions, the voice quality was normal  Neck: neck is symmetric, no visualized mass  Pulmonary/chest: respiratory effort normal, no generalized signs of difficulty breathing or signs of respiratory distress  Musculoskeletal: normal station, normal range of motion of neck  Neurological: no facial asymmetry, normal general cortical function      ASSESSMENT/PLAN:    1. Type 2 diabetes mellitus, poorly controlled, with neuropathy ([address]  Worsening  Not taking Humalog always. Will restart it. Rhina Frias recommended by Nephrologist. Has tendency to have UTIs, yeast infections. Will not start now, will reevaluate. Counseled on CGM  Hemoglobin A1c 9.4-9.8-8.5-9.1  Send blood glucose records for review  Current diabetic medications include: Lantus 32 units twice daily, Humalog 8 units 3 times daily with meals, Trulicity 3 mg weekly. Dean Conklin not want to change Trulicity now. Correction scale of Humalo-200-2 units, 201-250-4 units, 251-300-6 units, >301 -units 8 units   - Hemoglobin A1C; Future  - Lipid Panel; Future  - Microalbumin / Creatinine Urine Ratio; Future  - Comprehensive Metabolic Panel; Future     2. Diabetic nephropathy ([address]  Continue monitoring  Continue Losartan 100 mg daily  Will discuss Rhina Frias with Nephrologist  Microalbumin creatinine ratio 81.6-99.8-146.9-127.2  - Hemoglobin A1C; Future  - Lipid Panel; Future  - Microalbumin / Creatinine Urine Ratio;  Future  - Comprehensive Metabolic Panel; Future     3. Type 2 diabetes, poorly controlled, with retinopathy ([facility]  Follow with ophthalmology     4. Vitamin D deficiency  25OH vitamin D 47-42.7-39.3-45-51-53.7  Vitamin D 50,000 international units weekly  - Vitamin D 25 Hydroxy; Future     5. Mixed hyperlipidemia  LDL 62-42-12-20  Triglycerides [phone]  HDL 41-61-54-10  Rosuvastatin 20 mg daily  - Lipid Panel; Future     6. Essential hypertension  Verapamil, losartan/hydrochlorthiazide     7. Obesity  Diet, exercise     8. CKD stage unspecified stage  Worsening  Creatinine 1.3-1.5-1.3-1.6  GFR 23-58-05-65        Reviewed and/or ordered clinical lab results Yes  Reviewed and/or ordered radiology tests Yes MRI brain  Reviewed and/or ordered other diagnostic tests No  Discussed test results with performing physician No  Independently reviewed image, tracing, or specimen No  Made a decision to obtain old records No  Reviewed and summarized old records Yes   25OH VITAMIN D 47  Hemoglobin A1c 7.8  Hypertension  Hyperlipidemia  Obtained history from other than patient No    Alisha Seaman was counseled regarding symptoms of diabetes, hyperlipidemia, hypertension diagnosis, course and complications of disease if inadequately treated, side effects of medications, diagnosis, treatment options, and prognosis, risks, benefits, complications, and alternatives of treatment, labs, imaging and other studies and treatment targets and goals, insulin correction scale, prandial injections. She understands instructions and counseling. These diagnosis were discussed and reviewed with the patient including the advantages of drug therapy. She was counseled at this visit on the following: diabetes complication prevention and foot care. Alisha Seaman is a 67 y.o. female was evaluated through a synchronous (real-time) audio-video encounter.   The patient (or guardian if applicable) is aware that this is billable service, which includes applicable co-pays. This virtual visit was conducted with the patient's (and/or legal guardian's) consent. The visit was conducted pursuant to the emergency declaration under the [facility], [address] [place] [address] authority and the BIO-NEMS Act. Patient identification was verified, and a caregiver was present when appropriate. I conducted an interview, performed a limited exam by video and educated the patient on my assessment and plan. Due to this being a TeleHealth encounter (During XFSBL-94 public health emergency), evaluation of the following organ systems was limited: Vitals/Constitutional/EENT/Resp/CV/GI//MS/Neuro/Skin/Heme-Lymph-Imm. The patient (and/or legal guardian) has also been advised to contact this office for worsening conditions or problems, and seek emergency medical treatment and/or call 911 if deemed necessary. Total time spent on this encounter via Telehealth (synchronous, real-time audio/visual connection):  30  min  See assessment, plan and counseling note for counseling and care coordination details. Persons participating in the telehealth service: provider - Alvaro Nguyen MD and patient Marlon Philippe. Provider was located at her office. Patient was located at home. The patient was located in the state where the provider was licensed to provide care. --Alvaro Nguyen MD on 1/24/2023 at 10:30 AM    An electronic signature was used to authenticate this note. Return in about 3 months (around 4/24/2023) for diabetes.     Electronically signed by Alvaro Nguyen MD on 1/24/2023 at 10:30 AM

## 2023-02-21 DIAGNOSIS — K21.9 LARYNGOPHARYNGEAL REFLUX DISEASE: ICD-10-CM

## 2023-02-21 RX ORDER — FAMOTIDINE 20 MG/1
40 TABLET, FILM COATED ORAL DAILY
Qty: 180 TABLET | Refills: 0 | Status: SHIPPED | OUTPATIENT
Start: 2023-02-21

## 2023-03-08 ENCOUNTER — TELEPHONE (OUTPATIENT)
Dept: PRIMARY CARE CLINIC | Age: 73
End: 2023-03-08

## 2023-04-18 ENCOUNTER — TELEPHONE (OUTPATIENT)
Dept: PRIMARY CARE CLINIC | Age: 73
End: 2023-04-18

## 2023-04-18 NOTE — TELEPHONE ENCOUNTER
Called Dr. Phill Agarwal office. We received a fax for patient to order diabetic shoes. I let the office know that Dr. Quan Aviles, Endocrinology manages her diabetes so they would need to fill out said form. Spoke with Tata Suárez LPN she said Dr. Quan Aviles would fill it out for patient.

## 2023-05-12 ENCOUNTER — OFFICE VISIT (OUTPATIENT)
Dept: PRIMARY CARE CLINIC | Age: 73
End: 2023-05-12

## 2023-05-12 VITALS
DIASTOLIC BLOOD PRESSURE: 72 MMHG | WEIGHT: 224 LBS | TEMPERATURE: 97.3 F | SYSTOLIC BLOOD PRESSURE: 136 MMHG | BODY MASS INDEX: 36 KG/M2 | OXYGEN SATURATION: 96 % | RESPIRATION RATE: 18 BRPM | HEIGHT: 66 IN | HEART RATE: 67 BPM

## 2023-05-12 DIAGNOSIS — E55.9 VITAMIN D DEFICIENCY: ICD-10-CM

## 2023-05-12 DIAGNOSIS — E78.2 MIXED HYPERLIPIDEMIA: ICD-10-CM

## 2023-05-12 DIAGNOSIS — R51.9 NONINTRACTABLE HEADACHE, UNSPECIFIED CHRONICITY PATTERN, UNSPECIFIED HEADACHE TYPE: ICD-10-CM

## 2023-05-12 DIAGNOSIS — I10 ESSENTIAL HYPERTENSION: ICD-10-CM

## 2023-05-12 DIAGNOSIS — H91.92 HEARING PROBLEM OF LEFT EAR: ICD-10-CM

## 2023-05-12 DIAGNOSIS — Z00.00 MEDICARE ANNUAL WELLNESS VISIT, SUBSEQUENT: Primary | ICD-10-CM

## 2023-05-12 DIAGNOSIS — K21.9 LARYNGOPHARYNGEAL REFLUX DISEASE: ICD-10-CM

## 2023-05-12 DIAGNOSIS — N39.0 FREQUENT UTI: ICD-10-CM

## 2023-05-12 DIAGNOSIS — H92.02 LEFT EAR PAIN: ICD-10-CM

## 2023-05-12 RX ORDER — ERGOCALCIFEROL 1.25 MG/1
CAPSULE ORAL
Qty: 12 CAPSULE | Refills: 1 | Status: SHIPPED | OUTPATIENT
Start: 2023-05-12

## 2023-05-12 RX ORDER — ROSUVASTATIN CALCIUM 20 MG/1
20 TABLET, COATED ORAL DAILY
Qty: 90 TABLET | Refills: 1 | Status: SHIPPED | OUTPATIENT
Start: 2023-05-12

## 2023-05-12 RX ORDER — LOSARTAN POTASSIUM AND HYDROCHLOROTHIAZIDE 25; 100 MG/1; MG/1
1 TABLET ORAL DAILY
Qty: 90 TABLET | Refills: 1 | Status: SHIPPED | OUTPATIENT
Start: 2023-05-12

## 2023-05-12 SDOH — ECONOMIC STABILITY: INCOME INSECURITY: HOW HARD IS IT FOR YOU TO PAY FOR THE VERY BASICS LIKE FOOD, HOUSING, MEDICAL CARE, AND HEATING?: NOT HARD AT ALL

## 2023-05-12 SDOH — ECONOMIC STABILITY: FOOD INSECURITY: WITHIN THE PAST 12 MONTHS, YOU WORRIED THAT YOUR FOOD WOULD RUN OUT BEFORE YOU GOT MONEY TO BUY MORE.: NEVER TRUE

## 2023-05-12 SDOH — ECONOMIC STABILITY: FOOD INSECURITY: WITHIN THE PAST 12 MONTHS, THE FOOD YOU BOUGHT JUST DIDN'T LAST AND YOU DIDN'T HAVE MONEY TO GET MORE.: NEVER TRUE

## 2023-05-12 SDOH — ECONOMIC STABILITY: HOUSING INSECURITY
IN THE LAST 12 MONTHS, WAS THERE A TIME WHEN YOU DID NOT HAVE A STEADY PLACE TO SLEEP OR SLEPT IN A SHELTER (INCLUDING NOW)?: NO

## 2023-05-12 ASSESSMENT — PATIENT HEALTH QUESTIONNAIRE - PHQ9
1. LITTLE INTEREST OR PLEASURE IN DOING THINGS: 3
SUM OF ALL RESPONSES TO PHQ QUESTIONS 1-9: 3
SUM OF ALL RESPONSES TO PHQ QUESTIONS 1-9: 3
5. POOR APPETITE OR OVEREATING: 0
3. TROUBLE FALLING OR STAYING ASLEEP: 0
SUM OF ALL RESPONSES TO PHQ9 QUESTIONS 1 & 2: 3
SUM OF ALL RESPONSES TO PHQ QUESTIONS 1-9: 3
9. THOUGHTS THAT YOU WOULD BE BETTER OFF DEAD, OR OF HURTING YOURSELF: 0
7. TROUBLE CONCENTRATING ON THINGS, SUCH AS READING THE NEWSPAPER OR WATCHING TELEVISION: 0
4. FEELING TIRED OR HAVING LITTLE ENERGY: 0
6. FEELING BAD ABOUT YOURSELF - OR THAT YOU ARE A FAILURE OR HAVE LET YOURSELF OR YOUR FAMILY DOWN: 0
2. FEELING DOWN, DEPRESSED OR HOPELESS: 0
10. IF YOU CHECKED OFF ANY PROBLEMS, HOW DIFFICULT HAVE THESE PROBLEMS MADE IT FOR YOU TO DO YOUR WORK, TAKE CARE OF THINGS AT HOME, OR GET ALONG WITH OTHER PEOPLE: 0
SUM OF ALL RESPONSES TO PHQ QUESTIONS 1-9: 3
8. MOVING OR SPEAKING SO SLOWLY THAT OTHER PEOPLE COULD HAVE NOTICED. OR THE OPPOSITE, BEING SO FIGETY OR RESTLESS THAT YOU HAVE BEEN MOVING AROUND A LOT MORE THAN USUAL: 0

## 2023-05-12 ASSESSMENT — LIFESTYLE VARIABLES
HOW MANY STANDARD DRINKS CONTAINING ALCOHOL DO YOU HAVE ON A TYPICAL DAY: PATIENT DOES NOT DRINK
HOW OFTEN DO YOU HAVE A DRINK CONTAINING ALCOHOL: NEVER

## 2023-05-21 PROBLEM — H91.92 HEARING PROBLEM OF LEFT EAR: Status: ACTIVE | Noted: 2023-05-21

## 2023-05-21 PROBLEM — R51.9 NONINTRACTABLE HEADACHE: Status: ACTIVE | Noted: 2023-05-21

## 2023-05-21 PROBLEM — N39.0 FREQUENT UTI: Status: ACTIVE | Noted: 2023-05-21

## 2023-06-09 ENCOUNTER — OFFICE VISIT (OUTPATIENT)
Dept: ENT CLINIC | Age: 73
End: 2023-06-09
Payer: MEDICARE

## 2023-06-09 ENCOUNTER — PROCEDURE VISIT (OUTPATIENT)
Dept: AUDIOLOGY | Age: 73
End: 2023-06-09

## 2023-06-09 VITALS
HEIGHT: 67 IN | DIASTOLIC BLOOD PRESSURE: 66 MMHG | BODY MASS INDEX: 34.94 KG/M2 | TEMPERATURE: 97.3 F | WEIGHT: 222.6 LBS | HEART RATE: 74 BPM | SYSTOLIC BLOOD PRESSURE: 125 MMHG

## 2023-06-09 DIAGNOSIS — R09.89 GLOBUS PHARYNGEUS: ICD-10-CM

## 2023-06-09 DIAGNOSIS — K21.9 LARYNGOPHARYNGEAL REFLUX (LPR): ICD-10-CM

## 2023-06-09 DIAGNOSIS — H90.5 SENSORINEURAL HEARING LOSS (SNHL) OF LEFT EAR, UNSPECIFIED HEARING STATUS ON CONTRALATERAL SIDE: Primary | ICD-10-CM

## 2023-06-09 DIAGNOSIS — H90.3 SENSORINEURAL HEARING LOSS, BILATERAL: Primary | ICD-10-CM

## 2023-06-09 PROCEDURE — 3074F SYST BP LT 130 MM HG: CPT | Performed by: OTOLARYNGOLOGY

## 2023-06-09 PROCEDURE — 1123F ACP DISCUSS/DSCN MKR DOCD: CPT | Performed by: OTOLARYNGOLOGY

## 2023-06-09 PROCEDURE — 3078F DIAST BP <80 MM HG: CPT | Performed by: OTOLARYNGOLOGY

## 2023-06-09 PROCEDURE — 99203 OFFICE O/P NEW LOW 30 MIN: CPT | Performed by: OTOLARYNGOLOGY

## 2023-06-09 RX ORDER — PANTOPRAZOLE SODIUM 40 MG/1
40 TABLET, DELAYED RELEASE ORAL
Qty: 30 TABLET | Refills: 5 | Status: SHIPPED | OUTPATIENT
Start: 2023-06-09

## 2023-06-09 RX ORDER — PREDNISONE 10 MG/1
TABLET ORAL
Qty: 20 TABLET | Refills: 0 | Status: SHIPPED | OUTPATIENT
Start: 2023-06-09 | End: 2023-06-19

## 2023-06-09 NOTE — PATIENT INSTRUCTIONS
of the sound, you should do your best to move away from the source of the sound. Sound decreases in intensity as we move further from the source. The sound will decrease by 6 dB for every doubling of distance from the sound source. Exposure to these sounds may cause permanent damage to your hearing.   If you suspect your hearing has changed, it is recommended that you have your hearing tested by your audiologist.

## 2023-06-09 NOTE — PROGRESS NOTES
Vitamin D deficiency 2019                                                    Past Surgical History:   Procedure Laterality Date     SECTION      x 1     EYE SURGERY      right eye- cataract     TONSILLECTOMY       FAMILY HISTORY: Family history reviewed. Except as noted in history of present illness, there is no pertinent family history      REVIEW OF SYSTEMS:  All pertinent positive and negative review of systems included in HPI. Otherwise, all systems are reviewed and negative. PHYSICAL EXAMINATION:   GENERAL: wdwn- no acute distress  RESPIRATORY:  No stridor or respiratory distress  COMMUNICATION :  Normal voice  MENTAL STATUS:  Mood and affect normal, oriented X 3  HEAD AND FACE:  No abnormalities of the skin of face or head  EXTERNAL EARS AND NOSE:  Normal pinnae bilateral  FACIAL MUSCLES:  All branches of facial nerve intact  EXTRAOCULAR MUSCLES: Intact with full range of motion  FACE PALPATION:  No tenderness over sinuses. Zygomatic arches and orbital rims intact  OTOSCOPY:  Normal external auditory canals, tympanic membranes, and middle ear spaces  TUNING FORKS: Rinne ++ Nicole midline at 512 Hz  INTRANASAL:  Septum midline, turbinates normal, meati clear. LIPS, TEETH, GINGIVA:  Normal mucosa  PHARYNX:  Normal  NECK:  No masses. LYMPHATIC:  No cervical adenopathy  SALIVARY GLANDS:  No swelling or masses in the parotid or submandibular salivary glands  THYROID:  No goiter or thyroid masses. AUDIOGRAM & TYMPANOGRAM ORDERED AND REVIEWED: Asymmetric hearing loss left worse than right. Auditory discrimination is not involved. The loss is primarily low-frequency. IMPRESSION: Sudden sensorineural hearing loss left ear. Extra esophageal reflux disease which has not been well controlled with H2 agonist.  PLAN: Prednisone 40 mg prescribed for the hearing loss with an 8-day taper.   I have asked patient to stop her H2 agonist and have started her on pantoprazole 40 mg to be taken prior to

## 2023-06-09 NOTE — PROGRESS NOTES
pressure and compliance, Type A tympanogram, consistent with normal middle ear function. LEFT EAR:  Hearing Sensitivity: Moderately severe rising to moderate sensorineural hearing loss, rising to normal hearing sensitivity. Speech Recognition Threshold: 50 dB HL  Word Recognition: Excellent 96%, based on NU-6 25-word list at 85 dBHL using recorded speech stimuli. Tympanometry: Normal peak pressure and compliance, Type A tympanogram, consistent with normal middle ear function. Reliability: Good   Transducer: Inserts    See scanned audiogram dated 6/9/2023 for results. PATIENT EDUCATION:       The following items were discussed with the patient:   - Good Communication Strategies  - Hearing Loss and Hearing Aids  - Tinnitus Management Strategies    - Noise-Induced Hearing Loss and use of Hearing Protection Devices (HPDs)     Educational information was shared in the After Visit Summary. RECOMMENDATIONS:                                                                                                                                                                                                                                                            The following items are recommended based on patient report and results from today's appointment:   - Continue medical follow-up with Sravani Stone MD.   - Retest hearing as medically indicated and/or sooner if a change in hearing is noted. - If desired, schedule a Hearing Aid Evaluation (HAE) appointment to discuss hearing aid options. - Utilize \"Good Communication Strategies\" as discussed to assist in speech understanding with communication partners. Tova Hu  Audiologist    Chart CC'd to:  Sravani Stone MD      Degree of   Hearing Sensitivity dB Range   Within Normal Limits (WNL) 0 - 20   Mild 20 - 40   Moderate 40 - 55   Moderately-Severe 55 - 70   Severe 70 - 90   Profound 90 +

## 2023-06-12 ENCOUNTER — TELEPHONE (OUTPATIENT)
Dept: ENT CLINIC | Age: 73
End: 2023-06-12

## 2023-06-12 DIAGNOSIS — H90.5 SENSORINEURAL HEARING LOSS (SNHL) OF LEFT EAR, UNSPECIFIED HEARING STATUS ON CONTRALATERAL SIDE: Primary | ICD-10-CM

## 2023-06-29 RX ORDER — DULAGLUTIDE 3 MG/.5ML
INJECTION, SOLUTION SUBCUTANEOUS
Qty: 2 ML | Refills: 3 | OUTPATIENT
Start: 2023-06-29

## 2023-08-11 DIAGNOSIS — E55.9 VITAMIN D DEFICIENCY: ICD-10-CM

## 2023-08-11 RX ORDER — ERGOCALCIFEROL 1.25 MG/1
CAPSULE ORAL
Qty: 12 CAPSULE | Refills: 1 | OUTPATIENT
Start: 2023-08-11

## 2023-08-11 NOTE — TELEPHONE ENCOUNTER
Medication:   Requested Prescriptions     Pending Prescriptions Disp Refills    vitamin D (ERGOCALCIFEROL) 1.25 MG (19309 UT) CAPS capsule [Pharmacy Med Name: VITAMIN D2 50,000IU (ERGO) CAP RX] 12 capsule 1     Sig: TAKE 1 CAPSULE BY MOUTH 1 TIME A WEEK     Last Filled:  05/12/2023    Last appt: 5/12/2023   Next appt: 11/17/2023    Last OARRS: No flowsheet data found.

## 2023-08-15 RX ORDER — INSULIN GLARGINE 100 [IU]/ML
INJECTION, SOLUTION SUBCUTANEOUS
Qty: 30 ML | Refills: 0 | Status: SHIPPED | OUTPATIENT
Start: 2023-08-15

## 2023-08-15 NOTE — TELEPHONE ENCOUNTER
Medication:   Requested Prescriptions     Pending Prescriptions Disp Refills    insulin glargine (LANTUS) 100 UNIT/ML injection vial 30 mL 3     Sig: INJECT 32 UNITS UNDER THE SKIN TWICE DAILY       Last Filled:      Patient Phone Number: [phone] (home)     Last appt: 5/12/2023   Next appt: 11/17/2023    Last Labs DM:   Lab Results   Component Value Date/Time    LABA1C 9.1 01/20/2023 08:58 AM       Last OARRS: No flowsheet data found. Preferred Pharmacy:   [address], [address] [place] [address] Drive [address]  Phone: [phone] Fax: [phone]  Medication:   Requested Prescriptions     Pending Prescriptions Disp Refills    insulin glargine (LANTUS) 100 UNIT/ML injection vial 30 mL 3     Sig: INJECT 32 UNITS UNDER THE SKIN TWICE DAILY       Last Filled:      Patient Phone Number: [phone] (home)     Last appt: 5/12/2023   Next appt: 11/17/2023    Last Labs DM:   Lab Results   Component Value Date/Time    LABA1C 9.1 01/20/2023 08:58 AM       Last OARRS: No flowsheet data found.     Preferred Pharmacy:   [address], [address] [place], [place] [address]on [facility]  [id number] [address]  Phone: [phone] Fax: [phone]

## 2023-08-15 NOTE — TELEPHONE ENCOUNTER
I sent prescription for Lantus 3 vials. There is no follow-up appointment scheduled. Please call patient and ask if she is going to follow here. If patient would like to follow here, please schedule sooner appointment, last appointment was in January.

## 2023-09-11 ENCOUNTER — TELEPHONE (OUTPATIENT)
Dept: ENDOCRINOLOGY | Age: 73
End: 2023-09-11

## 2023-09-11 NOTE — TELEPHONE ENCOUNTER
Pt called in wanting to cancel 9-13-23 appt and when told when next appt would be she requested a call back from [place].

## 2023-09-13 DIAGNOSIS — E11.65 POORLY CONTROLLED TYPE 2 DIABETES MELLITUS WITH NEUROPATHY (HCC): ICD-10-CM

## 2023-09-13 DIAGNOSIS — E11.40 POORLY CONTROLLED TYPE 2 DIABETES MELLITUS WITH NEUROPATHY (HCC): ICD-10-CM

## 2023-09-13 DIAGNOSIS — E78.2 MIXED HYPERLIPIDEMIA: ICD-10-CM

## 2023-09-13 DIAGNOSIS — I10 ESSENTIAL HYPERTENSION: ICD-10-CM

## 2023-09-13 DIAGNOSIS — E55.9 VITAMIN D DEFICIENCY: ICD-10-CM

## 2023-09-13 DIAGNOSIS — E11.319 POORLY CONTROLLED TYPE 2 DIABETES MELLITUS WITH RETINOPATHY (HCC): ICD-10-CM

## 2023-09-13 DIAGNOSIS — E11.65 POORLY CONTROLLED TYPE 2 DIABETES MELLITUS WITH RETINOPATHY (HCC): ICD-10-CM

## 2023-09-13 LAB
25(OH)D3 SERPL-MCNC: 58.8 NG/ML
ALBUMIN SERPL-MCNC: 3.9 G/DL (ref 3.4–5)
ALBUMIN/GLOB SERPL: 1.3 {RATIO} (ref 1.1–2.2)
ALP SERPL-CCNC: 103 U/L (ref 40–129)
ALT SERPL-CCNC: 13 U/L (ref 10–40)
ANION GAP SERPL CALCULATED.3IONS-SCNC: 10 MMOL/L (ref 3–16)
AST SERPL-CCNC: 16 U/L (ref 15–37)
BILIRUB SERPL-MCNC: 0.3 MG/DL (ref 0–1)
BUN SERPL-MCNC: 17 MG/DL (ref 7–20)
CALCIUM SERPL-MCNC: 8.8 MG/DL (ref 8.3–10.6)
CHLORIDE SERPL-SCNC: 102 MMOL/L (ref 99–110)
CHOLEST SERPL-MCNC: 130 MG/DL (ref 0–199)
CO2 SERPL-SCNC: 28 MMOL/L (ref 21–32)
CREAT SERPL-MCNC: 1.2 MG/DL (ref 0.6–1.2)
CREAT UR-MCNC: 95.1 MG/DL (ref 28–259)
EST. AVERAGE GLUCOSE BLD GHB EST-MCNC: 277.6 MG/DL
GFR SERPLBLD CREATININE-BSD FMLA CKD-EPI: 48 ML/MIN/{1.73_M2}
GLUCOSE SERPL-MCNC: 138 MG/DL (ref 70–99)
HBA1C MFR BLD: 11.3 %
HDLC SERPL-MCNC: 41 MG/DL (ref 40–60)
LDL CHOLESTEROL CALCULATED: 58 MG/DL
MICROALBUMIN UR DL<=1MG/L-MCNC: 18 MG/DL
MICROALBUMIN/CREAT UR: 189.3 MG/G (ref 0–30)
POTASSIUM SERPL-SCNC: 4.1 MMOL/L (ref 3.5–5.1)
PROT SERPL-MCNC: 6.8 G/DL (ref 6.4–8.2)
SODIUM SERPL-SCNC: 140 MMOL/L (ref 136–145)
TRIGL SERPL-MCNC: 157 MG/DL (ref 0–150)
VLDLC SERPL CALC-MCNC: 31 MG/DL

## 2023-09-19 PROBLEM — N18.30 STAGE 3 CHRONIC KIDNEY DISEASE (HCC): Status: ACTIVE | Noted: 2023-09-19

## 2023-09-19 PROBLEM — E66.812 CLASS 2 SEVERE OBESITY WITH SERIOUS COMORBIDITY AND BODY MASS INDEX (BMI) OF 35.0 TO 35.9 IN ADULT: Status: ACTIVE | Noted: 2023-09-19

## 2023-09-19 PROBLEM — E66.01 CLASS 2 SEVERE OBESITY WITH SERIOUS COMORBIDITY AND BODY MASS INDEX (BMI) OF 35.0 TO 35.9 IN ADULT (HCC): Status: ACTIVE | Noted: 2023-09-19

## 2023-09-20 ENCOUNTER — OFFICE VISIT (OUTPATIENT)
Dept: ENDOCRINOLOGY | Age: 73
End: 2023-09-20
Payer: MEDICARE

## 2023-09-20 VITALS
RESPIRATION RATE: 14 BRPM | HEIGHT: 67 IN | OXYGEN SATURATION: 98 % | HEART RATE: 60 BPM | TEMPERATURE: 98 F | BODY MASS INDEX: 35 KG/M2 | DIASTOLIC BLOOD PRESSURE: 70 MMHG | WEIGHT: 223 LBS | SYSTOLIC BLOOD PRESSURE: 153 MMHG

## 2023-09-20 DIAGNOSIS — E66.01 CLASS 2 SEVERE OBESITY WITH SERIOUS COMORBIDITY AND BODY MASS INDEX (BMI) OF 35.0 TO 35.9 IN ADULT, UNSPECIFIED OBESITY TYPE (HCC): ICD-10-CM

## 2023-09-20 DIAGNOSIS — N18.30 STAGE 3 CHRONIC KIDNEY DISEASE, UNSPECIFIED WHETHER STAGE 3A OR 3B CKD (HCC): ICD-10-CM

## 2023-09-20 DIAGNOSIS — E11.65 POORLY CONTROLLED TYPE 2 DIABETES MELLITUS WITH RETINOPATHY (HCC): ICD-10-CM

## 2023-09-20 DIAGNOSIS — E11.40 POORLY CONTROLLED TYPE 2 DIABETES MELLITUS WITH NEUROPATHY (HCC): Primary | ICD-10-CM

## 2023-09-20 DIAGNOSIS — E78.2 MIXED HYPERLIPIDEMIA: ICD-10-CM

## 2023-09-20 DIAGNOSIS — E11.65 POORLY CONTROLLED TYPE 2 DIABETES MELLITUS WITH NEUROPATHY (HCC): Primary | ICD-10-CM

## 2023-09-20 DIAGNOSIS — I10 ESSENTIAL HYPERTENSION: ICD-10-CM

## 2023-09-20 DIAGNOSIS — E55.9 VITAMIN D DEFICIENCY: ICD-10-CM

## 2023-09-20 DIAGNOSIS — E11.21 DIABETIC NEPHROPATHY ASSOCIATED WITH TYPE 2 DIABETES MELLITUS (HCC): ICD-10-CM

## 2023-09-20 DIAGNOSIS — E11.319 POORLY CONTROLLED TYPE 2 DIABETES MELLITUS WITH RETINOPATHY (HCC): ICD-10-CM

## 2023-09-20 PROCEDURE — 3078F DIAST BP <80 MM HG: CPT | Performed by: INTERNAL MEDICINE

## 2023-09-20 PROCEDURE — 3077F SYST BP >= 140 MM HG: CPT | Performed by: INTERNAL MEDICINE

## 2023-09-20 PROCEDURE — 99215 OFFICE O/P EST HI 40 MIN: CPT | Performed by: INTERNAL MEDICINE

## 2023-09-20 PROCEDURE — 3046F HEMOGLOBIN A1C LEVEL >9.0%: CPT | Performed by: INTERNAL MEDICINE

## 2023-09-20 PROCEDURE — 1123F ACP DISCUSS/DSCN MKR DOCD: CPT | Performed by: INTERNAL MEDICINE

## 2023-09-20 RX ORDER — INSULIN ASPART 100 [IU]/ML
INJECTION, SOLUTION INTRAVENOUS; SUBCUTANEOUS
Qty: 10 ML | Refills: 0 | Status: SHIPPED | COMMUNITY
Start: 2023-09-20

## 2023-09-20 RX ORDER — INSULIN GLARGINE 100 [IU]/ML
INJECTION, SOLUTION SUBCUTANEOUS
Qty: 1 ADJUSTABLE DOSE PRE-FILLED PEN SYRINGE | Refills: 0 | Status: SHIPPED | COMMUNITY
Start: 2023-09-20

## 2023-09-20 RX ORDER — INSULIN LISPRO 100 [IU]/ML
INJECTION, SOLUTION INTRAVENOUS; SUBCUTANEOUS
Qty: 30 ADJUSTABLE DOSE PRE-FILLED PEN SYRINGE | Refills: 1 | Status: SHIPPED | OUTPATIENT
Start: 2023-09-20

## 2023-09-20 RX ORDER — BLOOD SUGAR DIAGNOSTIC
1 STRIP MISCELLANEOUS 3 TIMES DAILY
Qty: 100 EACH | Refills: 3 | Status: SHIPPED | OUTPATIENT
Start: 2023-09-20

## 2023-09-20 RX ORDER — INSULIN GLARGINE 100 [IU]/ML
INJECTION, SOLUTION SUBCUTANEOUS
Qty: 30 ML | Refills: 1 | Status: SHIPPED | OUTPATIENT
Start: 2023-09-20

## 2023-09-20 RX ORDER — BLOOD-GLUCOSE SENSOR
EACH MISCELLANEOUS
Qty: 2 EACH | Refills: 5 | Status: SHIPPED | OUTPATIENT
Start: 2023-09-20 | End: 2023-09-20 | Stop reason: CLARIF

## 2023-09-20 RX ORDER — ACYCLOVIR 400 MG/1
TABLET ORAL
Qty: 1 EACH | Refills: 0 | Status: SHIPPED | OUTPATIENT
Start: 2023-09-20

## 2023-09-20 RX ORDER — ACYCLOVIR 400 MG/1
TABLET ORAL
Qty: 2 EACH | Refills: 5 | Status: SHIPPED | OUTPATIENT
Start: 2023-09-20

## 2023-09-20 NOTE — PROGRESS NOTES
Lab Results   Component Value Date/Time    CHOL 132 09/26/2022 11:19 AM     Lab Results   Component Value Date/Time    TRIG 106 09/26/2022 11:19 AM     Lab Results   Component Value Date/Time    HDL 41 09/13/2023 09:57 AM    HDL 43 01/23/2012 11:14 AM     Lab Results   Component Value Date/Time    LDLCALC 58 09/13/2023 09:57 AM     Lab Results   Component Value Date/Time    LABVLDL 31 09/13/2023 09:57 AM     No results found for: \"CHOLHDLRATIO\"  No results found for: \"EZVZ22FBN\"    Lab Results   Component Value Date/Time    VITD25 58.8 09/13/2023 09:57 AM        Review of Systems:  Constitutional: no fatigue, no fever, no recent weight gain, no recent weight loss, no changes in appetite  Eyes: no eye pain, no change in vision, no eye redness, no eye irritation, no double vision  Ears, nose, throat: no nasal congestion, no sore throat, no earache, no decrease in hearing, no hoarseness, no dry mouth, no sinus problems, no difficulty swallowing, no neck lumps, no dental problems, no mouth sores, no ringing in ears  Pulmonary: no shortness of breath, no wheezing, no dyspnea on exertion, no cough  Cardiovascular: no chest pain, no lower extremity edema, no orthopnea, no intermittent leg claudication, no palpitations  Gastrointestinal: no abdominal pain, no nausea, no vomiting, no diarrhea, no constipation, no dysphagia, no heartburn, no bloating  Genitourinary: no dysuria, no urinary incontinence, no urinary hesitancy, no urinary frequency, no feelings of urinary urgency, no nocturia  Musculoskeletal: no joint swelling, no joint stiffness, no joint pain, no muscle cramps, no muscle pain, no bone pain  Integument/Breast: no hair loss, no skin rashes, no skin lesions, no itching, no dry skin  Neurological: no numbness, no tingling, no weakness, no confusion, no headaches, no dizziness, no fainting, no tremors, no decrease in memory, no balance problems  Psychiatric: no anxiety, no depression, no

## 2023-11-20 DIAGNOSIS — E55.9 VITAMIN D DEFICIENCY: ICD-10-CM

## 2023-11-20 NOTE — TELEPHONE ENCOUNTER
----- Message from [address] [place] [address] sent at 11/20/2023  2:36 PM EST -----  Subject: Refill Request    QUESTIONS  Name of Medication? vitamin D (ERGOCALCIFEROL) 1.25 MG (07962 UT) CAPS   capsule  Patient-reported dosage and instructions? 1.25mg, 1 po weekly  How many days do you have left? 0  Preferred Pharmacy? [id number]  Pharmacy phone number (if available)? [phone]  ---------------------------------------------------------------------------  --------------  Sharon Marker INFO  What is the best way for the office to contact you? OK to leave message on   voicemail  Preferred Call Back Phone Number? [phone]  ---------------------------------------------------------------------------  --------------  SCRIPT ANSWERS  Relationship to Patient?  Self

## 2023-11-20 NOTE — TELEPHONE ENCOUNTER
Medication:   Requested Prescriptions     Pending Prescriptions Disp Refills    vitamin D (ERGOCALCIFEROL) 1.25 MG (84605 UT) CAPS capsule 12 capsule 1     Sig: TAKE 1 CAPSULE BY MOUTH 1 TIME A WEEK     Last Filled:  5.12.23    Last appt: 5/12/2023   Next appt: 1/19/2024    Last OARRS:        No data to display

## 2023-11-22 RX ORDER — ERGOCALCIFEROL 1.25 MG/1
CAPSULE ORAL
Qty: 12 CAPSULE | Refills: 1 | Status: SHIPPED | OUTPATIENT
Start: 2023-11-22

## 2023-12-02 DIAGNOSIS — E78.2 MIXED HYPERLIPIDEMIA: ICD-10-CM

## 2023-12-02 DIAGNOSIS — I10 ESSENTIAL HYPERTENSION: ICD-10-CM

## 2023-12-04 RX ORDER — ROSUVASTATIN CALCIUM 20 MG/1
20 TABLET, COATED ORAL DAILY
Qty: 90 TABLET | Refills: 0 | Status: SHIPPED | OUTPATIENT
Start: 2023-12-04

## 2023-12-04 RX ORDER — LOSARTAN POTASSIUM AND HYDROCHLOROTHIAZIDE 25; 100 MG/1; MG/1
1 TABLET ORAL DAILY
Qty: 90 TABLET | Refills: 0 | Status: SHIPPED | OUTPATIENT
Start: 2023-12-04

## 2023-12-04 NOTE — TELEPHONE ENCOUNTER
Medication:   Requested Prescriptions     Pending Prescriptions Disp Refills    losartan-hydroCHLOROthiazide (HYZAAR) 100-25 MG per tablet [Pharmacy Med Name: LOSARTAN/HCTZ 100/25MG TABLETS] 90 tablet 1     Sig: TAKE 1 TABLET BY MOUTH DAILY    rosuvastatin (CRESTOR) 20 MG tablet [Pharmacy Med Name: ROSUVASTATIN 20MG TABLETS] 90 tablet 1     Sig: TAKE 1 TABLET BY MOUTH DAILY     Last Filled:  8.25.22 8.25.22    Last appt: 5/12/2023   Next appt: 1/19/2024    Last OARRS:        No data to display

## 2024-01-08 ENCOUNTER — TELEPHONE (OUTPATIENT)
Dept: ENDOCRINOLOGY | Age: 74
End: 2024-01-08

## 2024-01-08 RX ORDER — INSULIN GLARGINE 100 [IU]/ML
INJECTION, SOLUTION SUBCUTANEOUS
Qty: 30 ML | Refills: 1 | Status: SHIPPED | OUTPATIENT
Start: 2024-01-08

## 2024-01-08 NOTE — TELEPHONE ENCOUNTER
Pt called requesting a refill      LANTUS 100 UNIT/ML injection vial       [facility] #[id number] - [place], [place] - [address] - P [phone] - F [phone] [address] [place] [address]  Phone: [phone]  Fax: [phone]

## 2024-02-12 ENCOUNTER — TELEPHONE (OUTPATIENT)
Dept: ENDOCRINOLOGY | Age: 74
End: 2024-02-12

## 2024-02-12 NOTE — TELEPHONE ENCOUNTER
Informed pt of her most recent A1c result. She has appt w/ her eye dr underwood. Efaxed the A1c result to them at  [phone]

## 2024-03-01 DIAGNOSIS — I10 ESSENTIAL HYPERTENSION: ICD-10-CM

## 2024-03-01 DIAGNOSIS — E78.2 MIXED HYPERLIPIDEMIA: ICD-10-CM

## 2024-03-01 NOTE — TELEPHONE ENCOUNTER
Medication:   Requested Prescriptions     Pending Prescriptions Disp Refills    losartan-hydroCHLOROthiazide (HYZAAR) 100-25 MG per tablet [Pharmacy Med Name: LOSARTAN/HCTZ 100/25MG TABLETS] 90 tablet 0     Sig: TAKE 1 TABLET BY MOUTH DAILY    rosuvastatin (CRESTOR) 20 MG tablet [Pharmacy Med Name: ROSUVASTATIN 20MG TABLETS] 90 tablet 0     Sig: TAKE 1 TABLET BY MOUTH DAILY     Last Filled:  12.4.23 12.4.23    Last appt: 5/12/2023   Next appt: 3/8/2024    Last Lipid:   Lab Results   Component Value Date/Time    CHOL 132 09/26/2022 11:19 AM    TRIG 106 09/26/2022 11:19 AM    HDL 41 09/13/2023 09:57 AM    HDL 43 01/23/2012 11:14 AM    LDLCALC 58 09/13/2023 09:57 AM

## 2024-03-02 DIAGNOSIS — E78.2 MIXED HYPERLIPIDEMIA: ICD-10-CM

## 2024-03-02 DIAGNOSIS — I10 ESSENTIAL HYPERTENSION: ICD-10-CM

## 2024-03-02 RX ORDER — ROSUVASTATIN CALCIUM 20 MG/1
20 TABLET, COATED ORAL DAILY
Qty: 30 TABLET | Refills: 0 | Status: SHIPPED | OUTPATIENT
Start: 2024-03-02

## 2024-03-02 RX ORDER — LOSARTAN POTASSIUM AND HYDROCHLOROTHIAZIDE 25; 100 MG/1; MG/1
1 TABLET ORAL DAILY
Qty: 30 TABLET | Refills: 0 | Status: SHIPPED | OUTPATIENT
Start: 2024-03-02

## 2024-03-04 RX ORDER — LOSARTAN POTASSIUM AND HYDROCHLOROTHIAZIDE 25; 100 MG/1; MG/1
1 TABLET ORAL DAILY
Qty: 90 TABLET | OUTPATIENT
Start: 2024-03-04

## 2024-03-04 RX ORDER — ROSUVASTATIN CALCIUM 20 MG/1
20 TABLET, COATED ORAL DAILY
Qty: 90 TABLET | OUTPATIENT
Start: 2024-03-04

## 2024-03-08 ENCOUNTER — OFFICE VISIT (OUTPATIENT)
Dept: PRIMARY CARE CLINIC | Age: 74
End: 2024-03-08
Payer: MEDICARE

## 2024-03-08 VITALS
WEIGHT: 227.2 LBS | BODY MASS INDEX: 36.12 KG/M2 | HEART RATE: 79 BPM | SYSTOLIC BLOOD PRESSURE: 124 MMHG | DIASTOLIC BLOOD PRESSURE: 68 MMHG | OXYGEN SATURATION: 98 %

## 2024-03-08 DIAGNOSIS — E78.2 MIXED HYPERLIPIDEMIA: ICD-10-CM

## 2024-03-08 DIAGNOSIS — E11.65 POORLY CONTROLLED TYPE 2 DIABETES MELLITUS WITH NEUROPATHY (HCC): ICD-10-CM

## 2024-03-08 DIAGNOSIS — I10 ESSENTIAL HYPERTENSION: Primary | ICD-10-CM

## 2024-03-08 DIAGNOSIS — D64.9 ANEMIA, UNSPECIFIED TYPE: ICD-10-CM

## 2024-03-08 DIAGNOSIS — K21.9 LARYNGOPHARYNGEAL REFLUX DISEASE: ICD-10-CM

## 2024-03-08 DIAGNOSIS — E55.9 VITAMIN D DEFICIENCY: ICD-10-CM

## 2024-03-08 DIAGNOSIS — E66.01 SEVERE OBESITY (BMI 35.0-39.9) WITH COMORBIDITY (HCC): ICD-10-CM

## 2024-03-08 DIAGNOSIS — E11.40 POORLY CONTROLLED TYPE 2 DIABETES MELLITUS WITH NEUROPATHY (HCC): ICD-10-CM

## 2024-03-08 DIAGNOSIS — N89.8 VAGINAL ODOR: ICD-10-CM

## 2024-03-08 DIAGNOSIS — N18.32 STAGE 3B CHRONIC KIDNEY DISEASE (HCC): ICD-10-CM

## 2024-03-08 PROCEDURE — 1123F ACP DISCUSS/DSCN MKR DOCD: CPT | Performed by: INTERNAL MEDICINE

## 2024-03-08 PROCEDURE — 3078F DIAST BP <80 MM HG: CPT | Performed by: INTERNAL MEDICINE

## 2024-03-08 PROCEDURE — 99214 OFFICE O/P EST MOD 30 MIN: CPT | Performed by: INTERNAL MEDICINE

## 2024-03-08 PROCEDURE — 3074F SYST BP LT 130 MM HG: CPT | Performed by: INTERNAL MEDICINE

## 2024-03-08 RX ORDER — SPIRONOLACTONE 25 MG/1
12.5 TABLET ORAL DAILY
COMMUNITY
Start: 2024-03-06 | End: 2024-06-04

## 2024-03-08 RX ORDER — FAMOTIDINE 20 MG/1
40 TABLET, FILM COATED ORAL DAILY
Qty: 180 TABLET | Refills: 1 | Status: SHIPPED | OUTPATIENT
Start: 2024-03-08

## 2024-03-08 RX ORDER — ERGOCALCIFEROL 1.25 MG/1
CAPSULE ORAL
Qty: 12 CAPSULE | Refills: 1 | Status: SHIPPED | OUTPATIENT
Start: 2024-03-08

## 2024-03-08 ASSESSMENT — PATIENT HEALTH QUESTIONNAIRE - PHQ9
2. FEELING DOWN, DEPRESSED OR HOPELESS: NOT AT ALL
SUM OF ALL RESPONSES TO PHQ QUESTIONS 1-9: 0
SUM OF ALL RESPONSES TO PHQ9 QUESTIONS 1 & 2: 0
SUM OF ALL RESPONSES TO PHQ QUESTIONS 1-9: 0
SUM OF ALL RESPONSES TO PHQ QUESTIONS 1-9: 0
1. LITTLE INTEREST OR PLEASURE IN DOING THINGS: NOT AT ALL
SUM OF ALL RESPONSES TO PHQ QUESTIONS 1-9: 0

## 2024-03-08 NOTE — PATIENT INSTRUCTIONS
-Low sodium diet   -Low fat, low cholesterol diet  -low carbohydrate diet  -Regular aerobic exercise

## 2024-03-08 NOTE — PROGRESS NOTES
03/04/2024 43 (A)     Calcium 03/04/2024 8.7    Orders Only on 03/04/2024   Component Date Value    Vit D, 25-Hydroxy 03/04/2024 54.5    Orders Only on 03/04/2024   Component Date Value    Ferritin 03/04/2024 154.5 (H)    There may be more visits with results that are not included.           Medications, Allergies, Social history, Medical history, and results reviewed      An electronic signature was used to authenticate this note.    -Lea Dos Santos MD

## 2024-03-18 PROBLEM — N89.8 VAGINAL ODOR: Status: ACTIVE | Noted: 2024-03-18

## 2024-03-18 PROBLEM — E66.01 SEVERE OBESITY (BMI 35.0-39.9) WITH COMORBIDITY (HCC): Status: ACTIVE | Noted: 2024-03-18

## 2024-03-18 ASSESSMENT — ENCOUNTER SYMPTOMS
SORE THROAT: 0
TROUBLE SWALLOWING: 0
SINUS PRESSURE: 0
SHORTNESS OF BREATH: 0
CONSTIPATION: 0
RHINORRHEA: 0
CHEST TIGHTNESS: 0
COUGH: 0
WHEEZING: 0
VOMITING: 0
BLOOD IN STOOL: 0
NAUSEA: 0
ABDOMINAL PAIN: 0
DIARRHEA: 0

## 2024-03-27 ENCOUNTER — OFFICE VISIT (OUTPATIENT)
Dept: ENDOCRINOLOGY | Age: 74
End: 2024-03-27
Payer: MEDICARE

## 2024-03-27 VITALS
HEIGHT: 66 IN | DIASTOLIC BLOOD PRESSURE: 75 MMHG | WEIGHT: 212 LBS | RESPIRATION RATE: 14 BRPM | HEART RATE: 70 BPM | TEMPERATURE: 98 F | BODY MASS INDEX: 34.07 KG/M2 | OXYGEN SATURATION: 99 % | SYSTOLIC BLOOD PRESSURE: 147 MMHG

## 2024-03-27 DIAGNOSIS — E55.9 VITAMIN D DEFICIENCY: ICD-10-CM

## 2024-03-27 DIAGNOSIS — I10 ESSENTIAL HYPERTENSION: ICD-10-CM

## 2024-03-27 DIAGNOSIS — E78.2 MIXED HYPERLIPIDEMIA: ICD-10-CM

## 2024-03-27 DIAGNOSIS — E11.40 POORLY CONTROLLED TYPE 2 DIABETES MELLITUS WITH NEUROPATHY (HCC): Primary | ICD-10-CM

## 2024-03-27 DIAGNOSIS — E11.319 POORLY CONTROLLED TYPE 2 DIABETES MELLITUS WITH RETINOPATHY (HCC): ICD-10-CM

## 2024-03-27 DIAGNOSIS — E66.9 CLASS 1 OBESITY WITH SERIOUS COMORBIDITY AND BODY MASS INDEX (BMI) OF 33.0 TO 33.9 IN ADULT, UNSPECIFIED OBESITY TYPE: ICD-10-CM

## 2024-03-27 DIAGNOSIS — N18.30 STAGE 3 CHRONIC KIDNEY DISEASE, UNSPECIFIED WHETHER STAGE 3A OR 3B CKD (HCC): ICD-10-CM

## 2024-03-27 DIAGNOSIS — E11.65 POORLY CONTROLLED TYPE 2 DIABETES MELLITUS WITH RETINOPATHY (HCC): ICD-10-CM

## 2024-03-27 DIAGNOSIS — E11.65 POORLY CONTROLLED TYPE 2 DIABETES MELLITUS WITH NEUROPATHY (HCC): Primary | ICD-10-CM

## 2024-03-27 DIAGNOSIS — E11.21 DIABETIC NEPHROPATHY ASSOCIATED WITH TYPE 2 DIABETES MELLITUS (HCC): ICD-10-CM

## 2024-03-27 PROBLEM — E66.811 CLASS 1 OBESITY WITH BODY MASS INDEX (BMI) OF 33.0 TO 33.9 IN ADULT: Status: ACTIVE | Noted: 2024-03-27

## 2024-03-27 PROCEDURE — 3077F SYST BP >= 140 MM HG: CPT | Performed by: INTERNAL MEDICINE

## 2024-03-27 PROCEDURE — 3078F DIAST BP <80 MM HG: CPT | Performed by: INTERNAL MEDICINE

## 2024-03-27 PROCEDURE — 99214 OFFICE O/P EST MOD 30 MIN: CPT | Performed by: INTERNAL MEDICINE

## 2024-03-27 PROCEDURE — 1123F ACP DISCUSS/DSCN MKR DOCD: CPT | Performed by: INTERNAL MEDICINE

## 2024-03-27 RX ORDER — INSULIN GLARGINE 100 [IU]/ML
INJECTION, SOLUTION SUBCUTANEOUS
Qty: 30 ML | Refills: 1 | Status: SHIPPED | OUTPATIENT
Start: 2024-03-27

## 2024-03-27 RX ORDER — INSULIN LISPRO 100 [IU]/ML
INJECTION, SOLUTION INTRAVENOUS; SUBCUTANEOUS
Qty: 30 ADJUSTABLE DOSE PRE-FILLED PEN SYRINGE | Refills: 1 | Status: SHIPPED | OUTPATIENT
Start: 2024-03-27

## 2024-03-27 NOTE — PROGRESS NOTES
with ophthalmology     4. Vitamin D deficiency  25OH vitamin D 47-42.7-39.3-45-51-53.7-58.8-54.5  Vitamin D 50,000 IU weekly  - Vitamin D 25 Hydroxy; Future     5. Mixed hyperlipidemia  LDL 10-50-85-55-58  Triglycerides [phone]  HDL 33-60-46-39-41  Rosuvastatin 20 mg daily  - Lipid Panel; Future     6. Essential hypertension  Verapamil, losartan/hydrochlorthiazide     7. Obesity  Diet, exercise     8.  CKD stage unspecified stage  Creatinine 1.3-1.5-1.3-1.6-1.2-1.3-1.3  GFR 40-69-74-34-48-43-43  PTH 82.2-57.6-89.6  -CMP      Reviewed and/or ordered clinical lab results Yes  Reviewed and/or ordered radiology tests Yes MRI brain  Reviewed and/or ordered other diagnostic tests No  Discussed test results with performing physician No  Independently reviewed image, tracing, or specimen No  Made a decision to obtain old records No  Reviewed and summarized old records Yes   25OH VITAMIN D 47  Hemoglobin A1c 7.8  Hypertension  Hyperlipidemia  Obtained history from other than patient No    Davide ADELITA Declan was counseled regarding symptoms of diabetes, hyperlipidemia, hypertension diagnosis, course and complications of disease if inadequately treated, side effects of medications, diagnosis, treatment options, and prognosis, risks, benefits, complications, and alternatives of treatment, labs, imaging and other studies and treatment targets and goals, insulin correction scale, prandial injections.  She understands instructions and counseling.    These diagnosis were discussed and reviewed with the patient including the advantages of drug therapy. She was counseled at this visit on the following: diabetes complication prevention and foot care.    Return in about 6 months (around 9/27/2024) for diabetes.    Electronically signed by Rasheeda Dos Santos MD on 3/27/2024 at 10:22 AM

## 2024-03-31 DIAGNOSIS — I10 ESSENTIAL HYPERTENSION: ICD-10-CM

## 2024-03-31 DIAGNOSIS — E78.2 MIXED HYPERLIPIDEMIA: ICD-10-CM

## 2024-04-01 NOTE — TELEPHONE ENCOUNTER
Medication:   Requested Prescriptions     Pending Prescriptions Disp Refills    rosuvastatin (CRESTOR) 20 MG tablet [Pharmacy Med Name: ROSUVASTATIN 20MG TABLETS] 90 tablet      Sig: TAKE 1 TABLET BY MOUTH DAILY    losartan-hydroCHLOROthiazide (HYZAAR) 100-25 MG per tablet [Pharmacy Med Name: LOSARTAN/HCTZ 100/25MG TABLETS] 90 tablet      Sig: TAKE 1 TABLET BY MOUTH DAILY     Last Filled:  3.24.24 3.2.24    Last appt: 3/8/2024   Next appt: 6/7/2024    Last Lipid:   Lab Results   Component Value Date/Time    CHOL 132 09/26/2022 11:19 AM    TRIG 106 09/26/2022 11:19 AM    HDL 41 09/13/2023 09:57 AM    HDL 43 01/23/2012 11:14 AM    LDLCALC 58 09/13/2023 09:57 AM

## 2024-04-02 RX ORDER — LOSARTAN POTASSIUM AND HYDROCHLOROTHIAZIDE 25; 100 MG/1; MG/1
1 TABLET ORAL DAILY
Qty: 90 TABLET | Refills: 1 | Status: SHIPPED | OUTPATIENT
Start: 2024-04-02

## 2024-04-02 RX ORDER — ROSUVASTATIN CALCIUM 20 MG/1
20 TABLET, COATED ORAL DAILY
Qty: 90 TABLET | Refills: 1 | Status: SHIPPED | OUTPATIENT
Start: 2024-04-02

## 2024-04-19 ENCOUNTER — OFFICE VISIT (OUTPATIENT)
Dept: GYNECOLOGY | Age: 74
End: 2024-04-19
Payer: MEDICARE

## 2024-04-19 VITALS — WEIGHT: 212 LBS | BODY MASS INDEX: 34.07 KG/M2 | HEIGHT: 66 IN

## 2024-04-19 DIAGNOSIS — N89.8 VAGINAL ODOR: Primary | ICD-10-CM

## 2024-04-19 DIAGNOSIS — B37.31 ACUTE CANDIDIASIS OF VULVA AND VAGINA: ICD-10-CM

## 2024-04-19 DIAGNOSIS — N95.2 VAGINAL ATROPHY: ICD-10-CM

## 2024-04-19 PROCEDURE — 99203 OFFICE O/P NEW LOW 30 MIN: CPT | Performed by: OBSTETRICS & GYNECOLOGY

## 2024-04-19 PROCEDURE — 1123F ACP DISCUSS/DSCN MKR DOCD: CPT | Performed by: OBSTETRICS & GYNECOLOGY

## 2024-04-19 NOTE — PROGRESS NOTES
(TIMOPTIC) 0.5 % ophthalmic solution PUT 1 DROP INTO BOTH EYES THREE TIMES DAILY      Blood Pressure Monitoring (BLOOD PRESSURE MONITOR/L CUFF) MISC Use to monitor blood pressure 1 each 0    ciclopirox (LOPROX) 0.77 % cream APPLY TO GREAT TOES DAILY      urea (CARMOL) 20 % cream Apply topically as needed      UNKNOWN TO PATIENT Yellow and orange capped eye drops for BP in eye      pantoprazole (PROTONIX) 40 MG tablet Take 1 tablet by mouth daily (before dinner) 90 tablet 0     No current facility-administered medications on file prior to visit.     Allergy: Januvia [sitagliptin phosphate], Actos [pioglitazone], Avandia [rosiglitazone], Dye [iodides], Jardiance [empagliflozin], Kerendia [finerenone], and Metformin  Social History     Tobacco Use    Smoking status: Never    Smokeless tobacco: Never   Substance Use Topics    Alcohol use: No     Family History   Problem Relation Age of Onset    Diabetes Sister     Heart Disease Sister 66    Diabetes Brother         had amputation    High Blood Pressure Brother     Cancer Father      Social History     Socioeconomic History    Marital status: Single     Spouse name: Not on file    Number of children: Not on file    Years of education: Not on file    Highest education level: Not on file   Occupational History    Occupation:  at [employer]   Tobacco Use    Smoking status: Never    Smokeless tobacco: Never   Vaping Use    Vaping Use: Never used   Substance and Sexual Activity    Alcohol use: No    Drug use: No    Sexual activity: Not Currently   Other Topics Concern    Not on file   Social History Narrative    Not on file     Social Determinants of Health     Financial Resource Strain: Low Risk  (5/12/2023)    Overall Financial Resource Strain (CARDIA)     Difficulty of Paying Living Expenses: Not hard at all   Food Insecurity: Not on file (5/12/2023)   Transportation Needs: Unknown (5/12/2023)    PRAPARE - Transportation     Lack of Transportation

## 2024-04-20 LAB
CANDIDA DNA VAG QL NAA+PROBE: ABNORMAL
G VAGINALIS DNA SPEC QL NAA+PROBE: ABNORMAL
T VAGINALIS DNA VAG QL NAA+PROBE: ABNORMAL

## 2024-04-20 RX ORDER — FLUCONAZOLE 150 MG/1
TABLET ORAL
Qty: 2 TABLET | Refills: 0 | Status: SHIPPED | OUTPATIENT
Start: 2024-04-20

## 2024-04-23 LAB
C TRACH DNA CVX QL NAA+PROBE: NEGATIVE
N GONORRHOEA DNA CERV MUCUS QL NAA+PROBE: NEGATIVE

## 2024-07-25 ENCOUNTER — TELEPHONE (OUTPATIENT)
Dept: ENDOCRINOLOGY | Age: 74
End: 2024-07-25

## 2024-07-25 DIAGNOSIS — E55.9 VITAMIN D DEFICIENCY: ICD-10-CM

## 2024-07-25 DIAGNOSIS — E11.65 POORLY CONTROLLED TYPE 2 DIABETES MELLITUS WITH NEUROPATHY (HCC): ICD-10-CM

## 2024-07-25 DIAGNOSIS — E11.40 POORLY CONTROLLED TYPE 2 DIABETES MELLITUS WITH NEUROPATHY (HCC): ICD-10-CM

## 2024-07-25 RX ORDER — INSULIN GLARGINE 100 [IU]/ML
INJECTION, SOLUTION SUBCUTANEOUS
Qty: 30 ML | Refills: 1 | Status: SHIPPED | OUTPATIENT
Start: 2024-07-25

## 2024-07-30 ENCOUNTER — TELEPHONE (OUTPATIENT)
Dept: PRIMARY CARE CLINIC | Age: 74
End: 2024-07-30

## 2024-08-01 ENCOUNTER — TELEMEDICINE (OUTPATIENT)
Dept: PRIMARY CARE CLINIC | Age: 74
End: 2024-08-01

## 2024-08-01 DIAGNOSIS — Z00.00 MEDICARE ANNUAL WELLNESS VISIT, SUBSEQUENT: Primary | ICD-10-CM

## 2024-08-01 DIAGNOSIS — Z12.31 ENCOUNTER FOR SCREENING MAMMOGRAM FOR MALIGNANT NEOPLASM OF BREAST: ICD-10-CM

## 2024-08-01 DIAGNOSIS — Z71.89 HEARING AID CONSULTATION: ICD-10-CM

## 2024-08-01 DIAGNOSIS — N39.0 RECURRENT UTI: ICD-10-CM

## 2024-08-01 RX ORDER — LATANOPROSTENE BUNOD 0.24 MG/ML
SOLUTION/ DROPS OPHTHALMIC
COMMUNITY
Start: 2024-06-25

## 2024-08-01 SDOH — ECONOMIC STABILITY: FOOD INSECURITY: WITHIN THE PAST 12 MONTHS, THE FOOD YOU BOUGHT JUST DIDN'T LAST AND YOU DIDN'T HAVE MONEY TO GET MORE.: NEVER TRUE

## 2024-08-01 SDOH — ECONOMIC STABILITY: FOOD INSECURITY: WITHIN THE PAST 12 MONTHS, YOU WORRIED THAT YOUR FOOD WOULD RUN OUT BEFORE YOU GOT MONEY TO BUY MORE.: NEVER TRUE

## 2024-08-01 SDOH — ECONOMIC STABILITY: INCOME INSECURITY: HOW HARD IS IT FOR YOU TO PAY FOR THE VERY BASICS LIKE FOOD, HOUSING, MEDICAL CARE, AND HEATING?: NOT HARD AT ALL

## 2024-08-01 ASSESSMENT — PATIENT HEALTH QUESTIONNAIRE - PHQ9
SUM OF ALL RESPONSES TO PHQ QUESTIONS 1-9: 0
1. LITTLE INTEREST OR PLEASURE IN DOING THINGS: NOT AT ALL
SUM OF ALL RESPONSES TO PHQ QUESTIONS 1-9: 0
SUM OF ALL RESPONSES TO PHQ QUESTIONS 1-9: 0
2. FEELING DOWN, DEPRESSED OR HOPELESS: NOT AT ALL
SUM OF ALL RESPONSES TO PHQ QUESTIONS 1-9: 0
SUM OF ALL RESPONSES TO PHQ9 QUESTIONS 1 & 2: 0

## 2024-08-01 NOTE — PROGRESS NOTES
Medicare Annual Wellness Visit    Davide Manriquez is here for Medicare AWV    Assessment & Plan   Medicare annual wellness visit, subsequent  -     Ambulatory Referral to ACP Clinical Specialist  Recurrent UTI  -     Soto Lawrence MD, Urogynecology, [facility]  Encounter for screening mammogram for malignant neoplasm of breast  -     SHERIN DIGITAL SCREEN W OR WO CAD BILATERAL; Future  Hearing aid consultation  -     Sherri Esteves AU.D., Audiology, [facility]  Recommendations for Preventive Services Due: see orders and patient instructions/AVS.  Recommended screening schedule for the next 5-10 years is provided to the patient in written form: see Patient Instructions/AVS.     No follow-ups on file.     Subjective   The following acute and/or chronic problems were also addressed today:  Recurring UTI  -Referral for Urogynecology  -Daily Cranberry intake recommended  -When cleaning vaginal area be sure to completely dry area    HM Mammogram  -Order written, patient given scheduling number    Patient's complete Health Risk Assessment and screening values have been reviewed and are found in Flowsheets. The following problems were reviewed today and where indicated follow up appointments were made and/or referrals ordered.    Positive Risk Factor Screenings with Interventions:    Fall Risk:  Do you feel unsteady or are you worried about falling? : (!) yes (When bending over or reaching up she uses a reacher or asks someone to help.)  2 or more falls in past year?: no  Fall with injury in past year?: no     Interventions:    Reviewed medications, home hazards, visual acuity, and co-morbidities that can increase risk for falls  Patient declines any further evaluation or treatment            General HRA Questions:  Select all that apply: (!) Loneliness  Interventions - Loneliness:  Patient comments: She states, it's more loneliness of a lack S/O. She does have a support system of friends

## 2024-08-02 ENCOUNTER — CLINICAL DOCUMENTATION (OUTPATIENT)
Dept: SPIRITUAL SERVICES | Age: 74
End: 2024-08-02

## 2024-09-06 ENCOUNTER — TELEPHONE (OUTPATIENT)
Dept: ENDOCRINOLOGY | Age: 74
End: 2024-09-06

## 2024-09-06 DIAGNOSIS — E55.9 VITAMIN D DEFICIENCY: ICD-10-CM

## 2024-09-06 DIAGNOSIS — E11.65 POORLY CONTROLLED TYPE 2 DIABETES MELLITUS WITH NEUROPATHY (HCC): ICD-10-CM

## 2024-09-06 DIAGNOSIS — E11.40 POORLY CONTROLLED TYPE 2 DIABETES MELLITUS WITH NEUROPATHY (HCC): ICD-10-CM

## 2024-09-09 RX ORDER — SEMAGLUTIDE 0.68 MG/ML
INJECTION, SOLUTION SUBCUTANEOUS
Qty: 3 ML | Refills: 1 | Status: SHIPPED | OUTPATIENT
Start: 2024-09-09

## 2024-09-09 RX ORDER — INSULIN LISPRO 100 [IU]/ML
INJECTION, SOLUTION INTRAVENOUS; SUBCUTANEOUS
Qty: 30 ADJUSTABLE DOSE PRE-FILLED PEN SYRINGE | Refills: 1 | Status: SHIPPED | OUTPATIENT
Start: 2024-09-09

## 2024-09-10 ENCOUNTER — OFFICE VISIT (OUTPATIENT)
Dept: UROGYNECOLOGY | Age: 74
End: 2024-09-10
Payer: MEDICARE

## 2024-09-10 VITALS
OXYGEN SATURATION: 98 % | WEIGHT: 226 LBS | DIASTOLIC BLOOD PRESSURE: 77 MMHG | BODY MASS INDEX: 36.48 KG/M2 | HEART RATE: 80 BPM | RESPIRATION RATE: 16 BRPM | SYSTOLIC BLOOD PRESSURE: 146 MMHG | TEMPERATURE: 97.6 F

## 2024-09-10 DIAGNOSIS — N39.41 URGE INCONTINENCE: ICD-10-CM

## 2024-09-10 DIAGNOSIS — N39.0 FREQUENT UTI: Primary | ICD-10-CM

## 2024-09-10 DIAGNOSIS — E11.65 POORLY CONTROLLED TYPE 2 DIABETES MELLITUS WITH RETINOPATHY (HCC): ICD-10-CM

## 2024-09-10 DIAGNOSIS — E11.319 POORLY CONTROLLED TYPE 2 DIABETES MELLITUS WITH RETINOPATHY (HCC): ICD-10-CM

## 2024-09-10 DIAGNOSIS — R39.15 URINARY URGENCY: ICD-10-CM

## 2024-09-10 DIAGNOSIS — N89.8 VAGINAL DISCHARGE: ICD-10-CM

## 2024-09-10 DIAGNOSIS — E55.9 VITAMIN D DEFICIENCY: ICD-10-CM

## 2024-09-10 DIAGNOSIS — N95.2 VAGINAL ATROPHY: ICD-10-CM

## 2024-09-10 LAB
BILIRUBIN, POC: NORMAL
BLOOD URINE, POC: NORMAL
CLARITY, POC: CLEAR
COLOR, POC: YELLOW
GLUCOSE URINE, POC: NORMAL MG/DL
KETONES, POC: NORMAL MG/DL
LEUKOCYTE EST, POC: NORMAL
NITRITE, POC: NORMAL
PH, POC: 6.5
PROTEIN, POC: NORMAL MG/DL
SPECIFIC GRAVITY, POC: 1.01
UROBILINOGEN, POC: NORMAL MG/DL

## 2024-09-10 PROCEDURE — 81002 URINALYSIS NONAUTO W/O SCOPE: CPT | Performed by: NURSE PRACTITIONER

## 2024-09-10 PROCEDURE — 3078F DIAST BP <80 MM HG: CPT | Performed by: NURSE PRACTITIONER

## 2024-09-10 PROCEDURE — 3077F SYST BP >= 140 MM HG: CPT | Performed by: NURSE PRACTITIONER

## 2024-09-10 PROCEDURE — 99204 OFFICE O/P NEW MOD 45 MIN: CPT | Performed by: NURSE PRACTITIONER

## 2024-09-10 PROCEDURE — 1123F ACP DISCUSS/DSCN MKR DOCD: CPT | Performed by: NURSE PRACTITIONER

## 2024-09-10 RX ORDER — OXYBUTYNIN CHLORIDE 10 MG/1
10 TABLET, EXTENDED RELEASE ORAL DAILY
Qty: 30 TABLET | Refills: 2 | Status: SHIPPED | OUTPATIENT
Start: 2024-09-10 | End: 2024-09-10

## 2024-09-10 RX ORDER — FUROSEMIDE 20 MG
20 TABLET ORAL DAILY PRN
COMMUNITY
Start: 2024-09-04 | End: 2024-10-04

## 2024-09-10 RX ORDER — ERGOCALCIFEROL 1.25 MG/1
CAPSULE, LIQUID FILLED ORAL
Qty: 12 CAPSULE | Refills: 0 | Status: SHIPPED | OUTPATIENT
Start: 2024-09-10

## 2024-09-10 RX ORDER — OXYBUTYNIN CHLORIDE 10 MG/1
10 TABLET, EXTENDED RELEASE ORAL DAILY
Qty: 90 TABLET | Refills: 0 | Status: SHIPPED | OUTPATIENT
Start: 2024-09-10

## 2024-09-10 RX ORDER — ESTRADIOL 0.1 MG/G
0.25 CREAM VAGINAL DAILY
Qty: 30 G | Refills: 1 | Status: SHIPPED | OUTPATIENT
Start: 2024-09-10

## 2024-09-11 LAB
CANDIDA DNA VAG QL NAA+PROBE: NORMAL
G VAGINALIS DNA SPEC QL NAA+PROBE: NORMAL
T VAGINALIS DNA VAG QL NAA+PROBE: NORMAL

## 2024-09-13 LAB
BACTERIA UR CULT: ABNORMAL
ORGANISM: ABNORMAL

## 2024-09-13 RX ORDER — NITROFURANTOIN 25; 75 MG/1; MG/1
100 CAPSULE ORAL 2 TIMES DAILY
Qty: 14 CAPSULE | Refills: 0 | Status: SHIPPED | OUTPATIENT
Start: 2024-09-13 | End: 2024-09-20

## 2024-09-13 RX ORDER — FLUCONAZOLE 150 MG/1
150 TABLET ORAL ONCE
Qty: 1 TABLET | Refills: 0 | Status: SHIPPED | OUTPATIENT
Start: 2024-09-13 | End: 2024-09-13

## 2024-09-27 DIAGNOSIS — E78.2 MIXED HYPERLIPIDEMIA: ICD-10-CM

## 2024-09-27 DIAGNOSIS — I10 ESSENTIAL HYPERTENSION: ICD-10-CM

## 2024-09-27 RX ORDER — ROSUVASTATIN CALCIUM 20 MG/1
20 TABLET, COATED ORAL DAILY
Qty: 60 TABLET | Refills: 0 | Status: SHIPPED | OUTPATIENT
Start: 2024-09-27

## 2024-09-27 RX ORDER — LOSARTAN POTASSIUM AND HYDROCHLOROTHIAZIDE 25; 100 MG/1; MG/1
1 TABLET ORAL DAILY
Qty: 60 TABLET | Refills: 0 | Status: SHIPPED | OUTPATIENT
Start: 2024-09-27

## 2024-09-27 RX ORDER — ROSUVASTATIN CALCIUM 20 MG/1
20 TABLET, COATED ORAL DAILY
Qty: 90 TABLET | OUTPATIENT
Start: 2024-09-27

## 2024-09-27 RX ORDER — LOSARTAN POTASSIUM AND HYDROCHLOROTHIAZIDE 25; 100 MG/1; MG/1
1 TABLET ORAL DAILY
Qty: 90 TABLET | OUTPATIENT
Start: 2024-09-27

## 2024-10-02 ENCOUNTER — OFFICE VISIT (OUTPATIENT)
Dept: ENDOCRINOLOGY | Age: 74
End: 2024-10-02
Payer: MEDICARE

## 2024-10-02 VITALS
HEART RATE: 58 BPM | WEIGHT: 223 LBS | HEIGHT: 66 IN | TEMPERATURE: 98 F | BODY MASS INDEX: 35.84 KG/M2 | SYSTOLIC BLOOD PRESSURE: 164 MMHG | OXYGEN SATURATION: 97 % | RESPIRATION RATE: 14 BRPM | DIASTOLIC BLOOD PRESSURE: 69 MMHG

## 2024-10-02 DIAGNOSIS — E66.812 CLASS 2 SEVERE OBESITY WITH SERIOUS COMORBIDITY AND BODY MASS INDEX (BMI) OF 36.0 TO 36.9 IN ADULT, UNSPECIFIED OBESITY TYPE: ICD-10-CM

## 2024-10-02 DIAGNOSIS — E11.65 POORLY CONTROLLED TYPE 2 DIABETES MELLITUS WITH NEUROPATHY (HCC): Primary | ICD-10-CM

## 2024-10-02 DIAGNOSIS — E66.01 CLASS 2 SEVERE OBESITY WITH SERIOUS COMORBIDITY AND BODY MASS INDEX (BMI) OF 36.0 TO 36.9 IN ADULT, UNSPECIFIED OBESITY TYPE: ICD-10-CM

## 2024-10-02 DIAGNOSIS — N18.30 STAGE 3 CHRONIC KIDNEY DISEASE, UNSPECIFIED WHETHER STAGE 3A OR 3B CKD (HCC): ICD-10-CM

## 2024-10-02 DIAGNOSIS — E11.65 POORLY CONTROLLED TYPE 2 DIABETES MELLITUS WITH RETINOPATHY (HCC): ICD-10-CM

## 2024-10-02 DIAGNOSIS — E78.2 MIXED HYPERLIPIDEMIA: ICD-10-CM

## 2024-10-02 DIAGNOSIS — E11.40 POORLY CONTROLLED TYPE 2 DIABETES MELLITUS WITH NEUROPATHY (HCC): Primary | ICD-10-CM

## 2024-10-02 DIAGNOSIS — E11.319 POORLY CONTROLLED TYPE 2 DIABETES MELLITUS WITH RETINOPATHY (HCC): ICD-10-CM

## 2024-10-02 DIAGNOSIS — E11.21 DIABETIC NEPHROPATHY ASSOCIATED WITH TYPE 2 DIABETES MELLITUS (HCC): ICD-10-CM

## 2024-10-02 DIAGNOSIS — E55.9 VITAMIN D DEFICIENCY: ICD-10-CM

## 2024-10-02 DIAGNOSIS — I10 ESSENTIAL HYPERTENSION: ICD-10-CM

## 2024-10-02 PROCEDURE — 99215 OFFICE O/P EST HI 40 MIN: CPT | Performed by: INTERNAL MEDICINE

## 2024-10-02 PROCEDURE — 3077F SYST BP >= 140 MM HG: CPT | Performed by: INTERNAL MEDICINE

## 2024-10-02 PROCEDURE — 1123F ACP DISCUSS/DSCN MKR DOCD: CPT | Performed by: INTERNAL MEDICINE

## 2024-10-02 PROCEDURE — 3078F DIAST BP <80 MM HG: CPT | Performed by: INTERNAL MEDICINE

## 2024-10-02 NOTE — PROGRESS NOTES
Davide Manriquez is a 74 y.o. female who is being evaluated for Type 2 diabetes mellitus.   H.    Current symptoms/problems include  hyperglycemia  and are worsening.     Poorly controlled type 2 diabetes mellitus with neuropathy (HCC) [E11.40, E11.65]     Diagnosed with Type 2 diabetes mellitus in .     Comorbid conditions:  Hypertension, hyperlipidemia, obesity, Neuropathy, Nephropathy and Retinopathy     Current diabetic medications include: Lantus 32 units twice daily, Humalog 8 units 3 times daily with meals  Correction scale of Humalo-200-2 units, 201-250-4 units, 251-300-6 units, >301 -units 8 units   Intolerance to diabetes medications: Yes Actos-leg swelling, Metformin  Patient prefers do not take Trulicity    Has tendency to have UTIs and yeast infections  Weight trend: stable  Prior visit with dietician: yes  Current diet: on average, 2 meals per day  Current exercise: no     Current monitoring regimen: home blood tests - 4 times daily  Has brought blood glucose log/meter:  No  Home blood sugar records: fasting range: 100-130 and postprandial range: 100-130  Any episodes of hypoglycemia? No  Hypoglycemia frequency and time(s):    Does patient have Glucagon emergency kit? No  Does patient have rapid acting carbohydrate?  No  Does patient wear a medic alert bracelet or necklace?  No      2.  Diabetic nephropathy (HCC)  No urination problems     3. Type 2 diabetes, poorly controlled, with retinopathy (HCC)  No vision changes     4. Vitamin D deficiency  No fatigue     5. Mixed hyperlipidemia  No muscle pain     6. Essential hypertension  No headaches     7.  Obesity   Eats healthy     8.  CKD   No urination problems        Past Medical History:   Diagnosis Date    Allergic rhinitis     Dizziness     Hearing loss     Hyperlipidemia     Hypertension     Kidney disease     Laryngopharyngeal reflux disease 2019    Morbidly obese 10/29/2020    Nonintractable headache 2023    Sinusitis

## 2024-10-14 DIAGNOSIS — E11.65 POORLY CONTROLLED TYPE 2 DIABETES MELLITUS WITH NEUROPATHY (HCC): ICD-10-CM

## 2024-10-14 DIAGNOSIS — E11.21 DIABETIC NEPHROPATHY ASSOCIATED WITH TYPE 2 DIABETES MELLITUS (HCC): ICD-10-CM

## 2024-10-14 DIAGNOSIS — E78.2 MIXED HYPERLIPIDEMIA: ICD-10-CM

## 2024-10-14 DIAGNOSIS — N18.30 STAGE 3 CHRONIC KIDNEY DISEASE, UNSPECIFIED WHETHER STAGE 3A OR 3B CKD (HCC): ICD-10-CM

## 2024-10-14 DIAGNOSIS — E11.319 POORLY CONTROLLED TYPE 2 DIABETES MELLITUS WITH RETINOPATHY (HCC): ICD-10-CM

## 2024-10-14 DIAGNOSIS — E55.9 VITAMIN D DEFICIENCY: ICD-10-CM

## 2024-10-14 DIAGNOSIS — E11.40 POORLY CONTROLLED TYPE 2 DIABETES MELLITUS WITH NEUROPATHY (HCC): ICD-10-CM

## 2024-10-14 DIAGNOSIS — E11.65 POORLY CONTROLLED TYPE 2 DIABETES MELLITUS WITH RETINOPATHY (HCC): ICD-10-CM

## 2024-10-14 DIAGNOSIS — I10 ESSENTIAL HYPERTENSION: ICD-10-CM

## 2024-10-14 LAB
25(OH)D3 SERPL-MCNC: 44.4 NG/ML
ALBUMIN SERPL-MCNC: 3.8 G/DL (ref 3.4–5)
ALBUMIN/GLOB SERPL: 1.4 {RATIO} (ref 1.1–2.2)
ALP SERPL-CCNC: 105 U/L (ref 40–129)
ALT SERPL-CCNC: 17 U/L (ref 10–40)
ANION GAP SERPL CALCULATED.3IONS-SCNC: 10 MMOL/L (ref 3–16)
AST SERPL-CCNC: 21 U/L (ref 15–37)
BILIRUB SERPL-MCNC: <0.2 MG/DL (ref 0–1)
BUN SERPL-MCNC: 32 MG/DL (ref 7–20)
CALCIUM SERPL-MCNC: 9.1 MG/DL (ref 8.3–10.6)
CHLORIDE SERPL-SCNC: 101 MMOL/L (ref 99–110)
CHOLEST SERPL-MCNC: 120 MG/DL (ref 0–199)
CO2 SERPL-SCNC: 27 MMOL/L (ref 21–32)
CREAT SERPL-MCNC: 1.2 MG/DL (ref 0.6–1.2)
CREAT UR-MCNC: 77.1 MG/DL (ref 28–259)
GFR SERPLBLD CREATININE-BSD FMLA CKD-EPI: 47 ML/MIN/{1.73_M2}
GLUCOSE SERPL-MCNC: 156 MG/DL (ref 70–99)
HDLC SERPL-MCNC: 40 MG/DL (ref 40–60)
LDL CHOLESTEROL: 59 MG/DL
MICROALBUMIN UR DL<=1MG/L-MCNC: 14.5 MG/DL
MICROALBUMIN/CREAT UR: 188.1 MG/G (ref 0–30)
POTASSIUM SERPL-SCNC: 3.7 MMOL/L (ref 3.5–5.1)
PROT SERPL-MCNC: 6.5 G/DL (ref 6.4–8.2)
SODIUM SERPL-SCNC: 138 MMOL/L (ref 136–145)
T4 FREE SERPL-MCNC: 1.2 NG/DL (ref 0.9–1.8)
TRIGL SERPL-MCNC: 103 MG/DL (ref 0–150)
TSH SERPL DL<=0.005 MIU/L-ACNC: 3.14 UIU/ML (ref 0.27–4.2)
VLDLC SERPL CALC-MCNC: 21 MG/DL

## 2024-10-15 LAB
EST. AVERAGE GLUCOSE BLD GHB EST-MCNC: 260.4 MG/DL
HBA1C MFR BLD: 10.7 %

## 2024-10-30 ENCOUNTER — TELEPHONE (OUTPATIENT)
Dept: ENDOCRINOLOGY | Age: 74
End: 2024-10-30

## 2024-10-30 DIAGNOSIS — E11.65 POORLY CONTROLLED TYPE 2 DIABETES MELLITUS WITH NEUROPATHY (HCC): ICD-10-CM

## 2024-10-30 DIAGNOSIS — E55.9 VITAMIN D DEFICIENCY: ICD-10-CM

## 2024-10-30 DIAGNOSIS — E11.40 POORLY CONTROLLED TYPE 2 DIABETES MELLITUS WITH NEUROPATHY (HCC): ICD-10-CM

## 2024-10-30 RX ORDER — INSULIN GLARGINE 100 [IU]/ML
INJECTION, SOLUTION SUBCUTANEOUS
Qty: 30 ML | Refills: 0 | Status: SHIPPED | OUTPATIENT
Start: 2024-10-30

## 2024-10-30 RX ORDER — INSULIN LISPRO 100 [IU]/ML
INJECTION, SOLUTION INTRAVENOUS; SUBCUTANEOUS
Qty: 30 ADJUSTABLE DOSE PRE-FILLED PEN SYRINGE | Refills: 0 | Status: SHIPPED | OUTPATIENT
Start: 2024-10-30 | End: 2024-10-30 | Stop reason: SDUPTHER

## 2024-10-30 RX ORDER — INSULIN LISPRO 100 [IU]/ML
INJECTION, SOLUTION INTRAVENOUS; SUBCUTANEOUS
Qty: 90 ML | Refills: 0 | Status: SHIPPED | OUTPATIENT
Start: 2024-10-30

## 2024-10-30 RX ORDER — SEMAGLUTIDE 0.68 MG/ML
INJECTION, SOLUTION SUBCUTANEOUS
Qty: 3 ML | Refills: 1 | Status: SHIPPED | OUTPATIENT
Start: 2024-10-30

## 2024-10-30 NOTE — TELEPHONE ENCOUNTER
Pharmacy called in and didn't understand the script what is the 30 for.  30 ML or 30 actual pens for the Humalog Kwikpen . Please resend script with the correction thanks.     [facility] #[id number] - [place], [place] - [address] - P [phone] - F [phone]  [address] [address]  Phone: [phone]  Fax: [phone]

## 2024-10-30 NOTE — TELEPHONE ENCOUNTER
Rx sent.   Qbrexza Pregnancy And Lactation Text: There is no available data on Qbrexza use in pregnant women.  There is no available data on Qbrexza use in lactation.

## 2024-10-30 NOTE — TELEPHONE ENCOUNTER
Patient called requesting a refill     Rx- HUMALOG KWIKPEN 100 UNIT/ML    Semaglutide,0.25 or 0.5MG/DOS, (OZEMPIC, 0.25 OR 0.5 MG/DOSE,) 2 MG/3ML    LANTUS 100 UNIT/ML injection vial    Pharmacy- [facility] #[id number]    LOV-  NOV- 1/29/25    Please advise

## 2024-11-23 NOTE — PROGRESS NOTES
"                                                                                                             11/22/2024  Yang Monique is a 20 y.o., female.presents with h/o 548hrs of Right lower quadrant abdominal pain. She did have one bout of N/V.    Diagnosis:   Appendicitis, unspecified appendicitis type [K37]   Pre-op diagnosis: Appendicitis, unspecified appendicitis type [K37]       There are inflammatory changes seen involving an enlarged inflamed appendix consistent appendicitis.  Appendix extends from the right hemipelvis.  No abscess or perforation is seen.  A small amount of reactive fluid seen adjacent to the appendix.  No diverticulitis is seen.  No obvious colonic mass or lesion is seen           The pt. comes to [facility] for the noted procedure under GA/LMA vs GETA.  Case: [id number] Date/Time: 11/23/24 0715   Procedure: APPENDECTOMY, LAPAROSCOPIC (Abdomen)   Anesthesia type: Gen ETT     PMHx:Problem List  Current as of 11/22/24 2105  Acne Disturbance in sleep behavior   Obesity Reactive airway disease   Seasonal allergic rhinitis            PSHx:Surgical History    No surgical history recorded           Vital signs:Pre Vitals  Current as of 11/22/24 2106  BP: 102/63 Pulse: 65   Resp: 18 SpO2: 98   Temp: 36.6 °C (97.8 °F)   Height: 5' 1" (1.549 m) (11/22/24) Weight: 59 kg (130 lb) (11/22/24)   BMI: 24.6 IBW: 47.8 kg (105 lb 4.8 oz)   Last edited 11/22/24 1913 by YECENIA        Lab Data:      EKG:    Pre-op Assessment    I have reviewed the Patient Summary Reports.     I have reviewed the Nursing Notes. I have reviewed the NPO Status.   I have reviewed the Medications.     Review of Systems  Anesthesia Hx:  No problems with previous Anesthesia                Social:  Non-Smoker       Hematology/Oncology:  Hematology Normal   Oncology Normal                                   EENT/Dental:  EENT/Dental Normal           Cardiovascular:  Cardiovascular Normal Exercise tolerance: good                     Functional " Capacity good / => 4 METS                         Pulmonary:    Asthma       Asthma:               Renal/:  Renal/ Normal                 Hepatic/GI:  Hepatic/GI Normal                    Musculoskeletal:  Musculoskeletal Normal                Neurological:  Neurology Normal                                      Endocrine:  Endocrine Normal            Dermatological:  Skin Normal    Psych:  Psychiatric Normal                    Physical Exam  General: Alert, Oriented, Well nourished and Cooperative    Airway:  Mallampati: II   Mouth Opening: Normal  TM Distance: Normal  Tongue: Normal  Neck ROM: Normal ROM    Dental:  Intact    Chest/Lungs:  Clear to auscultation, Normal Respiratory Rate    Heart:  Rate: Normal  Rhythm: Regular Rhythm        Anesthesia Plan  Type of Anesthesia, risks & benefits discussed:    Anesthesia Type: Gen ETT  Intra-op Monitoring Plan: Standard ASA Monitors  Post Op Pain Control Plan: IV/PO Opioids PRN  Induction:  IV and Inhalation  Airway Plan: Direct  Informed Consent: Informed consent signed with the Patient and all parties understand the risks and agree with anesthesia plan.  All questions answered. Patient consented to blood products? Yes  ASA Score: 2  Day of Surgery Review of History & Physical: H&P Update referred to the surgeon/provider.    Ready For Surgery From Anesthesia Perspective.     .        Pneumococcal Conjugate 13-valent ([id number]) 12/01/2016    Pneumococcal Polysaccharide ([id number]) 07/10/2015    Tdap (Boostrix, Adacel) 08/18/2010       Vitals:    01/27/20 1510   BP: 136/62   Site: Right Upper Arm   Position: Sitting   Cuff Size: Large Adult   Pulse: 79   Resp: 14   Temp: 98.7 °F (37.1 °C)   TempSrc: Oral   SpO2: 96%   Weight: 236 lb 6.4 oz (107.2 kg)   Height: 5' 6\" (1.676 m)     Body mass index is 38.16 kg/m². Physical Exam  Nursing note reviewed. Constitutional:       General: She is not in acute distress. Appearance: Normal appearance. She is well-developed. HENT:      Head: Normocephalic and atraumatic. Right Ear: Tympanic membrane and ear canal normal.      Left Ear: Tympanic membrane and ear canal normal.   Eyes:      General: Lids are normal.      Conjunctiva/sclera: Conjunctivae normal.      Pupils: Pupils are equal, round, and reactive to light. Neck:      Musculoskeletal: Neck supple. Thyroid: No thyromegaly. Vascular: No carotid bruit. Cardiovascular:      Rate and Rhythm: Normal rate and regular rhythm. Heart sounds: Normal heart sounds, S1 normal and S2 normal. No murmur. No friction rub. No gallop. Pulmonary:      Effort: Pulmonary effort is normal. No respiratory distress. Breath sounds: Normal breath sounds. No wheezing, rhonchi or rales. Abdominal:      General: Bowel sounds are normal. There is no distension. Palpations: Abdomen is soft. Tenderness: There is no abdominal tenderness. Lymphadenopathy:      Head:      Right side of head: No submandibular adenopathy. Left side of head: No submandibular adenopathy. Skin:     Comments: Ingrowing toenail right great toe and slightly left great toe, toenails thick   Neurological:      Mental Status: She is alert and oriented to person, place, and time. Cranial Nerves: No cranial nerve deficit. Deep Tendon Reflexes: Reflexes are normal and symmetric. No results found for this visit on 01/27/20. Assessment/Plan     1. Senile cataract of right eye, unspecified age-related cataract type  -Preoperative history and physical done   -Patient is in satisfactory condition for a  Phacoemulsification and cataract Lens implant right eye to be done on 2/6/20 by Dr. Virgen Page    2. Preoperative examination  -Preoperative history and physical done   -Patient is in satisfactory condition for a  Phacoemulsification and cataract Lens implant right eye to be done on 2/6/20 by Dr. Bob Anthony same medications  -Avoid anti-inflammatories, aspirin, and fish oil for 7 days prior to surgery  -Tylenol 650mg 3 times daily if needed for pain  -Nothing to eat or drink after midnight prior to surgery    3. Uncontrolled type 2 diabetes mellitus with complication, with long-term current use of insulin (HCC)  -Continue same medications  -Limit carbohydrates to 45 grams with meals and 15 grams with snacks  -monitor blood sugars  -Regular aerobic exercise  -Patient to follow up with Endocrinology  - Amb External Referral To Podiatry    4. Essential hypertension  -stable  -Continue same medications  -Low sodium diet  -Regular aerobic exercise    5. Mixed hyperlipidemia  -Continue same medications  -Low fat, low cholesterol diet  -Regular aerobic exercise    6. Laryngopharyngeal reflux disease  -stable  -Continue same medications    7. Vitamin D deficiency  -Continue same medications    8. Ingrown toenail  - [facility] External Referral To Podiatry      Discussed medications with patient, who voiced understanding of their use and indications. All questions answered. Return in about 3 months (around 4/27/2020) for hypertension, laryngopharyngeal reflux, hyperlipidemia.

## 2024-12-04 ENCOUNTER — TELEPHONE (OUTPATIENT)
Dept: PRIMARY CARE CLINIC | Age: 74
End: 2024-12-04

## 2024-12-04 LAB
ESTIMATED AVERAGE GLUCOSE: NORMAL
HBA1C MFR BLD: 9.1 %

## 2024-12-12 ENCOUNTER — TELEPHONE (OUTPATIENT)
Dept: PRIMARY CARE CLINIC | Age: 74
End: 2024-12-12

## 2024-12-12 ENCOUNTER — TELEPHONE (OUTPATIENT)
Dept: ENDOCRINOLOGY | Age: 74
End: 2024-12-12

## 2024-12-12 DIAGNOSIS — E55.9 VITAMIN D DEFICIENCY: ICD-10-CM

## 2024-12-12 DIAGNOSIS — E11.40 POORLY CONTROLLED TYPE 2 DIABETES MELLITUS WITH NEUROPATHY (HCC): ICD-10-CM

## 2024-12-12 DIAGNOSIS — E11.65 POORLY CONTROLLED TYPE 2 DIABETES MELLITUS WITH NEUROPATHY (HCC): ICD-10-CM

## 2024-12-12 DIAGNOSIS — E78.2 MIXED HYPERLIPIDEMIA: ICD-10-CM

## 2024-12-12 DIAGNOSIS — I10 ESSENTIAL HYPERTENSION: ICD-10-CM

## 2024-12-12 RX ORDER — BLOOD-GLUCOSE METER
EACH MISCELLANEOUS
Qty: 1 KIT | Refills: 0 | Status: SHIPPED | OUTPATIENT
Start: 2024-12-12

## 2024-12-12 RX ORDER — BLOOD SUGAR DIAGNOSTIC
STRIP MISCELLANEOUS
Qty: 300 STRIP | Refills: 0 | Status: SHIPPED | OUTPATIENT
Start: 2024-12-12

## 2024-12-12 RX ORDER — INSULIN GLARGINE 100 [IU]/ML
INJECTION, SOLUTION SUBCUTANEOUS
Qty: 30 ML | Refills: 0 | Status: SHIPPED | OUTPATIENT
Start: 2024-12-12

## 2024-12-12 RX ORDER — SEMAGLUTIDE 0.68 MG/ML
INJECTION, SOLUTION SUBCUTANEOUS
Qty: 3 ML | Refills: 1 | Status: SHIPPED | OUTPATIENT
Start: 2024-12-12

## 2024-12-12 NOTE — TELEPHONE ENCOUNTER
Pt needs refill of      Semaglutide,0.25 or 0.5MG/DOS, (OZEMPIC, 0.25 OR 0.5 MG/DOSE,) 2 MG/3ML SOPN       LANTUS 100 UNIT/ML injection vial        Blood Glucose Monitoring Suppl (ACCU-CHEK ANJU PLUS) w/Device KIT     [facility] #[id number] - [place], [place] - [address] - P [phone] - F [phone] [address] [facility] [address]  Phone: [phone]  Fax: [phone]

## 2024-12-12 NOTE — TELEPHONE ENCOUNTER
Medication Refill Request  1) rosuvastatin (CRESTOR) 20 MG tablet   2) losartan-hydroCHLOROthiazide (HYZAAR) 100-25 MG per tablet     *90-days requested    Please send to:  [facility] #[id number] - [facility] [address] - [place] [phone] - F [phone]

## 2024-12-12 NOTE — TELEPHONE ENCOUNTER
Medication:   Requested Prescriptions     Pending Prescriptions Disp Refills    vitamin D (ERGOCALCIFEROL) 1.25 MG (21437 UT) CAPS capsule 12 capsule 0     Sig: TAKE 1 CAPSULE BY MOUTH 1 TIME A WEEK    rosuvastatin (CRESTOR) 20 MG tablet 60 tablet 0     Sig: Take 1 tablet by mouth daily    losartan-hydroCHLOROthiazide (HYZAAR) 100-25 MG per tablet 60 tablet 0     Sig: Take 1 tablet by mouth daily     Last Filled:  9/10/24 9/27/24 9/27/24    Last appt: 8/1/2024   Next appt: 12/27/2024    Last OARRS:        No data to display

## 2024-12-12 NOTE — TELEPHONE ENCOUNTER
Pt req vitamin D (ERGOCALCIFEROL) 1.25 MG (86914 UT) CAPS capsule [[id number]]  Be refilled to   [id number] - [place], [place] - [address] - P [phone] - F [phone] [address] [place] [address]  Phone: [phone]  Fax: [phone]     LOV:3/8/24  Please give pt a call back.

## 2024-12-13 RX ORDER — LOSARTAN POTASSIUM AND HYDROCHLOROTHIAZIDE 25; 100 MG/1; MG/1
1 TABLET ORAL DAILY
Qty: 90 TABLET | Refills: 0 | Status: SHIPPED | OUTPATIENT
Start: 2024-12-13

## 2024-12-13 RX ORDER — ROSUVASTATIN CALCIUM 20 MG/1
20 TABLET, COATED ORAL DAILY
Qty: 90 TABLET | Refills: 0 | Status: SHIPPED | OUTPATIENT
Start: 2024-12-13

## 2024-12-13 RX ORDER — ERGOCALCIFEROL 1.25 MG/1
CAPSULE, LIQUID FILLED ORAL
Qty: 12 CAPSULE | Refills: 0 | Status: SHIPPED | OUTPATIENT
Start: 2024-12-13

## 2024-12-27 ENCOUNTER — OFFICE VISIT (OUTPATIENT)
Dept: PRIMARY CARE CLINIC | Age: 74
End: 2024-12-27
Payer: MEDICARE

## 2024-12-27 VITALS
OXYGEN SATURATION: 94 % | SYSTOLIC BLOOD PRESSURE: 130 MMHG | HEART RATE: 95 BPM | WEIGHT: 224 LBS | BODY MASS INDEX: 36.17 KG/M2 | DIASTOLIC BLOOD PRESSURE: 66 MMHG

## 2024-12-27 DIAGNOSIS — I10 ESSENTIAL HYPERTENSION: Primary | ICD-10-CM

## 2024-12-27 DIAGNOSIS — K21.9 LARYNGOPHARYNGEAL REFLUX DISEASE: ICD-10-CM

## 2024-12-27 DIAGNOSIS — E55.9 VITAMIN D DEFICIENCY: ICD-10-CM

## 2024-12-27 DIAGNOSIS — Z23 NEED FOR INFLUENZA VACCINATION: ICD-10-CM

## 2024-12-27 DIAGNOSIS — E78.2 MIXED HYPERLIPIDEMIA: ICD-10-CM

## 2024-12-27 PROCEDURE — 3078F DIAST BP <80 MM HG: CPT | Performed by: INTERNAL MEDICINE

## 2024-12-27 PROCEDURE — G0008 ADMIN INFLUENZA VIRUS VAC: HCPCS | Performed by: INTERNAL MEDICINE

## 2024-12-27 PROCEDURE — 1159F MED LIST DOCD IN RCRD: CPT | Performed by: INTERNAL MEDICINE

## 2024-12-27 PROCEDURE — 99214 OFFICE O/P EST MOD 30 MIN: CPT | Performed by: INTERNAL MEDICINE

## 2024-12-27 PROCEDURE — 1160F RVW MEDS BY RX/DR IN RCRD: CPT | Performed by: INTERNAL MEDICINE

## 2024-12-27 PROCEDURE — 90653 IIV ADJUVANT VACCINE IM: CPT | Performed by: INTERNAL MEDICINE

## 2024-12-27 PROCEDURE — 1123F ACP DISCUSS/DSCN MKR DOCD: CPT | Performed by: INTERNAL MEDICINE

## 2024-12-27 PROCEDURE — 3075F SYST BP GE 130 - 139MM HG: CPT | Performed by: INTERNAL MEDICINE

## 2024-12-27 NOTE — PATIENT INSTRUCTIONS
-Decrease caffeine, avoid spicy foods, avoid tomato based foods  -Eat small meals instead of large meals  -Wait 2-3 hours after eating before lying down

## 2024-12-27 NOTE — PROGRESS NOTES
Abdominal:      General: Bowel sounds are normal. There is no distension.      Palpations: Abdomen is soft.      Tenderness: There is no abdominal tenderness.   Musculoskeletal:      Cervical back: Neck supple.      Right lower leg: No edema.      Left lower leg: No edema.   Lymphadenopathy:      Head:      Right side of head: No submandibular adenopathy.      Left side of head: No submandibular adenopathy.   Neurological:      Mental Status: She is alert and oriented to person, place, and time.   Psychiatric:         Mood and Affect: Mood normal.         No results found for this visit on 12/27/24.  Lab Review   Abstract on 12/04/2024   Component Date Value    Hemoglobin A1C 12/04/2024 9.1    Orders Only on 10/14/2024   Component Date Value    Sodium 10/14/2024 139     Potassium 10/14/2024 3.7     Chloride 10/14/2024 101     CO2 10/14/2024 27     Anion Gap 10/14/2024 11     Glucose 10/14/2024 154 (H)     BUN 10/14/2024 32 (H)     Creatinine 10/14/2024 1.3 (H)     Est, Glom Filt Rate 10/14/2024 43 (A)     Calcium 10/14/2024 9.2    Orders Only on 10/14/2024   Component Date Value    Vit D, 25-Hydroxy 10/14/2024 44.4     TSH 10/14/2024 3.14     T4 Free 10/14/2024 1.2     Albumin Urine 10/14/2024 14.50 (H)     Creatinine, Ur 10/14/2024 77.1     Albumin/Creatinine Ratio 10/14/2024 188.1 (H)     Cholesterol, Fasting 10/14/2024 120     Triglyceride, Fasting 10/14/2024 103     HDL 10/14/2024 40     LDL Cholesterol 10/14/2024 59     VLDL Cholesterol Calcula* 10/14/2024 21     Sodium 10/14/2024 138     Potassium 10/14/2024 3.7     Chloride 10/14/2024 101     CO2 10/14/2024 27     Anion Gap 10/14/2024 10     Glucose 10/14/2024 156 (H)     BUN 10/14/2024 32 (H)     Creatinine 10/14/2024 1.2     Est, Glom Filt Rate 10/14/2024 47 (A)     Calcium 10/14/2024 9.1     Total Protein 10/14/2024 6.5     Albumin 10/14/2024 3.8     Albumin/Globulin Ratio 10/14/2024 1.4     Total Bilirubin 10/14/2024 <0.2     Alkaline Phosphatase

## 2025-01-13 PROBLEM — Z23 NEED FOR INFLUENZA VACCINATION: Status: ACTIVE | Noted: 2025-01-13

## 2025-01-13 ASSESSMENT — ENCOUNTER SYMPTOMS
SORE THROAT: 0
RHINORRHEA: 0
VOMITING: 0
ABDOMINAL PAIN: 0
DIARRHEA: 0
CONSTIPATION: 0
SHORTNESS OF BREATH: 0
WHEEZING: 0
BLOOD IN STOOL: 0
NAUSEA: 0
CHEST TIGHTNESS: 0
SINUS PRESSURE: 0
COUGH: 0

## 2025-01-14 NOTE — ASSESSMENT & PLAN NOTE
-Not controlled  -Start omeprazole 20 mg once daily before breakfast  -Decrease caffeine, avoid spicy foods, avoid tomato based foods  -Eat small meals instead of large meals  -Wait 2-3 hours after eating before lying down

## 2025-01-14 NOTE — ASSESSMENT & PLAN NOTE
-Stable  -LDL is at goal  -Continue rosuvastatin 20 mg once daily  -low cholesterol, low fat diet  -regular aerobic exercise

## 2025-01-14 NOTE — ASSESSMENT & PLAN NOTE
-Stable  -Continue losartan-HCTZ 100-25 mg once daily  -Low sodium diet  -Regular aerobic exercise

## 2025-01-24 ENCOUNTER — TELEMEDICINE (OUTPATIENT)
Dept: PRIMARY CARE CLINIC | Age: 75
End: 2025-01-24

## 2025-01-24 DIAGNOSIS — K21.9 LARYNGOPHARYNGEAL REFLUX DISEASE: Primary | ICD-10-CM

## 2025-01-24 NOTE — ASSESSMENT & PLAN NOTE
-improved and stable  -continue omeprazole 20mg once daily before breakfast  -Decrease caffeine, avoid spicy foods, avoid tomato based foods  -Eat small meals instead of large meals  -Wait 2-3 hours after eating before lying down       Orders:    omeprazole (PRILOSEC) 20 MG delayed release capsule; Take 1 capsule by mouth every morning (before breakfast)

## 2025-01-24 NOTE — PROGRESS NOTES
Davide Manriquez, was evaluated through a synchronous (real-time) audio-video encounter. The patient (or guardian if applicable) is aware that this is a billable service, which includes applicable co-pays. This Virtual Visit was conducted with patient's (and/or legal guardian's) consent. Patient identification was verified, and a caregiver was present when appropriate.   The patient was located at Home: [address]  [address]  [place] [address]  Provider was located at Facility (Appt Dept): [address] [facility]  [address]  [place] [address]  Confirm you are appropriately licensed, registered, or certified to deliver care in the state where the patient is located as indicated above. If you are not or unsure, please re-schedule the visit: Yes, I confirm.     Davide Manriquez (:  1950) is a Established patient, presenting virtually for evaluation of the following:    Chief Complaint   Patient presents with    laryngopharyngeal reflux           Below is the assessment and plan developed based on review of pertinent history, physical exam, labs, studies, and medications.     Assessment & Plan  Laryngopharyngeal reflux disease  -improved and stable  -continue omeprazole 20mg once daily before breakfast  -Decrease caffeine, avoid spicy foods, avoid tomato based foods  -Eat small meals instead of large meals  -Wait 2-3 hours after eating before lying down       Orders:    omeprazole (PRILOSEC) 20 MG delayed release capsule; Take 1 capsule by mouth every morning (before breakfast)      Return in about 5 months (around 2025) for hypertension, hyperlipidemia, laryngopharyngeal reflux, and vitamin D deficiency.       Subjective   Patient has laryngopharyngeal reflux. Patient states Omeprazole is helping and working fine. Patient states it helps the hoarseness and phlegm in her throat. Patient denies heartburn and reflux. Patient states she is trying to stay away from spicy food and tomato based food. Patient

## 2025-01-29 ENCOUNTER — TELEMEDICINE (OUTPATIENT)
Dept: ENDOCRINOLOGY | Age: 75
End: 2025-01-29
Payer: MEDICARE

## 2025-01-29 DIAGNOSIS — E11.319 POORLY CONTROLLED TYPE 2 DIABETES MELLITUS WITH RETINOPATHY (HCC): ICD-10-CM

## 2025-01-29 DIAGNOSIS — E11.65 POORLY CONTROLLED TYPE 2 DIABETES MELLITUS WITH RETINOPATHY (HCC): ICD-10-CM

## 2025-01-29 DIAGNOSIS — E11.21 DIABETIC NEPHROPATHY ASSOCIATED WITH TYPE 2 DIABETES MELLITUS (HCC): ICD-10-CM

## 2025-01-29 DIAGNOSIS — E78.2 MIXED HYPERLIPIDEMIA: ICD-10-CM

## 2025-01-29 DIAGNOSIS — E11.65 POORLY CONTROLLED TYPE 2 DIABETES MELLITUS WITH NEUROPATHY (HCC): Primary | ICD-10-CM

## 2025-01-29 DIAGNOSIS — N18.30 STAGE 3 CHRONIC KIDNEY DISEASE, UNSPECIFIED WHETHER STAGE 3A OR 3B CKD (HCC): ICD-10-CM

## 2025-01-29 DIAGNOSIS — E66.01 CLASS 2 SEVERE OBESITY WITH SERIOUS COMORBIDITY AND BODY MASS INDEX (BMI) OF 36.0 TO 36.9 IN ADULT, UNSPECIFIED OBESITY TYPE: ICD-10-CM

## 2025-01-29 DIAGNOSIS — I10 ESSENTIAL HYPERTENSION: ICD-10-CM

## 2025-01-29 DIAGNOSIS — E11.40 POORLY CONTROLLED TYPE 2 DIABETES MELLITUS WITH NEUROPATHY (HCC): Primary | ICD-10-CM

## 2025-01-29 DIAGNOSIS — E66.812 CLASS 2 SEVERE OBESITY WITH SERIOUS COMORBIDITY AND BODY MASS INDEX (BMI) OF 36.0 TO 36.9 IN ADULT, UNSPECIFIED OBESITY TYPE: ICD-10-CM

## 2025-01-29 DIAGNOSIS — E55.9 VITAMIN D DEFICIENCY: ICD-10-CM

## 2025-01-29 PROCEDURE — G2211 COMPLEX E/M VISIT ADD ON: HCPCS | Performed by: INTERNAL MEDICINE

## 2025-01-29 PROCEDURE — 99214 OFFICE O/P EST MOD 30 MIN: CPT | Performed by: INTERNAL MEDICINE

## 2025-01-29 PROCEDURE — 1123F ACP DISCUSS/DSCN MKR DOCD: CPT | Performed by: INTERNAL MEDICINE

## 2025-01-29 PROCEDURE — 1160F RVW MEDS BY RX/DR IN RCRD: CPT | Performed by: INTERNAL MEDICINE

## 2025-01-29 PROCEDURE — 1159F MED LIST DOCD IN RCRD: CPT | Performed by: INTERNAL MEDICINE

## 2025-01-29 RX ORDER — BLOOD SUGAR DIAGNOSTIC
1 STRIP MISCELLANEOUS 3 TIMES DAILY
Qty: 100 EACH | Refills: 3 | Status: SHIPPED | OUTPATIENT
Start: 2025-01-29

## 2025-01-29 RX ORDER — FLURBIPROFEN SODIUM 0.3 MG/ML
1 SOLUTION/ DROPS OPHTHALMIC
Qty: 100 EACH | Refills: 5 | Status: SHIPPED | OUTPATIENT
Start: 2025-01-29

## 2025-01-29 RX ORDER — BLOOD SUGAR DIAGNOSTIC
STRIP MISCELLANEOUS
Qty: 300 STRIP | Refills: 5 | Status: SHIPPED | OUTPATIENT
Start: 2025-01-29

## 2025-01-29 RX ORDER — SEMAGLUTIDE 0.68 MG/ML
0.5 INJECTION, SOLUTION SUBCUTANEOUS WEEKLY
Qty: 3 ML | Refills: 5 | Status: SHIPPED | OUTPATIENT
Start: 2025-01-29

## 2025-01-29 RX ORDER — INSULIN LISPRO 100 [IU]/ML
INJECTION, SOLUTION INTRAVENOUS; SUBCUTANEOUS
Qty: 90 ML | Refills: 1 | Status: SHIPPED | OUTPATIENT
Start: 2025-01-29

## 2025-01-29 RX ORDER — LANCETS
EACH MISCELLANEOUS
Qty: 300 EACH | Refills: 5 | Status: SHIPPED | OUTPATIENT
Start: 2025-01-29

## 2025-01-29 RX ORDER — INSULIN GLARGINE 100 [IU]/ML
INJECTION, SOLUTION SUBCUTANEOUS
Qty: 30 ML | Refills: 3 | Status: SHIPPED | OUTPATIENT
Start: 2025-01-29

## 2025-01-29 NOTE — PROGRESS NOTES
identification was verified, and a caregiver was present when appropriate.   The patient was located at Home: [address]  [address]  [place] [address]  Provider was located at Facility (Appt Dept): [address]  [place] [address]  Confirm you are appropriately licensed, registered, or certified to deliver care in the state where the patient is located as indicated above. If you are not or unsure, please re-schedule the visit: Yes, I confirm.      The patient was located in the state where the provider was licensed to provide care.    Total time spent for this encounter:  30 minutes    --Rasheeda Dos Santos MD on 1/29/2025 at 12:15 PM    An electronic signature was used to authenticate this note.      Return in about 3 months (around 4/29/2025) for diabetes.    Electronically signed by Rasheeda Dos Santos MD on 1/29/2025 at 12:15 PM

## 2025-02-08 ASSESSMENT — ENCOUNTER SYMPTOMS
VOICE CHANGE: 1
SORE THROAT: 0
BLOOD IN STOOL: 0
TROUBLE SWALLOWING: 0
VOMITING: 0
ABDOMINAL PAIN: 0
WHEEZING: 0
NAUSEA: 0
SHORTNESS OF BREATH: 0

## 2025-02-12 PROBLEM — Z23 NEED FOR INFLUENZA VACCINATION: Status: RESOLVED | Noted: 2025-01-13 | Resolved: 2025-02-12

## 2025-03-06 DIAGNOSIS — E55.9 VITAMIN D DEFICIENCY: ICD-10-CM

## 2025-03-06 NOTE — TELEPHONE ENCOUNTER
Medication:   Requested Prescriptions     Pending Prescriptions Disp Refills    vitamin D (ERGOCALCIFEROL) 1.25 MG (43557 UT) CAPS capsule 12 capsule 0     Sig: TAKE 1 CAPSULE BY MOUTH 1 TIME A WEEK     Last filled: 12/13/24  Last appt: 1/24/2025   Next appt: 8/7/2025    Last OARRS:        No data to display

## 2025-03-06 NOTE — TELEPHONE ENCOUNTER
Patient called and requested to refill the following medication      vitamin D (ERGOCALCIFEROL) 1.25 MG (52726 UT) CAPS capsule     The pharmacy    [id number] - [place], [place] - [address] - P [phone] - F [phone] [address] [place] [address]  Phone: [phone]  Fax: [phone]     Pl review    thanks

## 2025-03-08 RX ORDER — ERGOCALCIFEROL 1.25 MG/1
CAPSULE, LIQUID FILLED ORAL
Qty: 12 CAPSULE | Refills: 0 | Status: SHIPPED | OUTPATIENT
Start: 2025-03-08

## 2025-03-19 DIAGNOSIS — I10 ESSENTIAL HYPERTENSION: ICD-10-CM

## 2025-03-19 DIAGNOSIS — E78.2 MIXED HYPERLIPIDEMIA: ICD-10-CM

## 2025-03-19 RX ORDER — ROSUVASTATIN CALCIUM 20 MG/1
20 TABLET, COATED ORAL DAILY
Qty: 90 TABLET | Refills: 1 | Status: SHIPPED | OUTPATIENT
Start: 2025-03-19

## 2025-03-19 RX ORDER — LOSARTAN POTASSIUM AND HYDROCHLOROTHIAZIDE 25; 100 MG/1; MG/1
1 TABLET ORAL DAILY
Qty: 90 TABLET | Refills: 1 | Status: SHIPPED | OUTPATIENT
Start: 2025-03-19

## 2025-03-19 NOTE — TELEPHONE ENCOUNTER
Medication:   Requested Prescriptions     Pending Prescriptions Disp Refills    losartan-hydroCHLOROthiazide (HYZAAR) 100-25 MG per tablet 90 tablet 0     Sig: Take 1 tablet by mouth daily    rosuvastatin (CRESTOR) 20 MG tablet 90 tablet 0     Sig: Take 1 tablet by mouth daily     Last Filled:  12/13/24    Last appt: 1/24/2025   Next appt: 8/7/2025    Last Lipid:   Lab Results   Component Value Date/Time    CHOL 132 09/26/2022 11:19 AM    TRIG 106 09/26/2022 11:19 AM    HDL 40 10/14/2024 10:40 AM    HDL 43 01/23/2012 11:14 AM

## 2025-03-25 NOTE — PATIENT INSTRUCTIONS
1. Pain of upper abdomen  - famotidine (PEPCID) 20 MG tablet; Take 1 tablet by mouth 2 times daily  Dispense: 60 tablet; Refill: 0  - dicyclomine (BENTYL) 10 MG capsule; Take 1 capsule by mouth 3 times daily as needed (abdominal cramping)  Dispense: 15 capsule; Refill: 0  - Comprehensive Metabolic Panel; Future  - CBC with Auto Differential; Future    2. Diarrhea, unspecified type  - Comprehensive Metabolic Panel; Future  - CBC with Auto Differential; Future  - C DIFF TOXIN/ANTIGEN; Future  - stay hydrated  - Gatorade    3. Nausea  - promethazine (PHENERGAN) 25 MG tablet; Take 1 tablet by mouth every 6 hours as needed for Nausea  Dispense: 10 tablet; Refill: 0  - Comprehensive Metabolic Panel; Future  - CBC with Auto Differential; Future    4. Gas bloat syndrome  - over the counter Gas-X as needed    5. Belching  - famotidine (PEPCID) 20 MG tablet; Take 1 tablet by mouth 2 times daily  Dispense: 60 tablet;  Refill: 0  -Decrease caffeine, avoid spicy foods, avoid tomato based foods  -Eat small meals instead of large meals  -Wait 2-3 hours after eating before lying down
Quality 226: Preventive Care And Screening: Tobacco Use: Screening And Cessation Intervention: Patient screened for tobacco use and is an ex/non-smoker
Quality 431: Preventive Care And Screening: Unhealthy Alcohol Use - Screening: Patient not identified as an unhealthy alcohol user when screened for unhealthy alcohol use using a systematic screening method
Detail Level: Detailed

## 2025-04-02 ENCOUNTER — HOSPITAL ENCOUNTER (EMERGENCY)
Age: 75
Discharge: HOME | End: 2025-04-02
Payer: MEDICARE

## 2025-04-02 VITALS
SYSTOLIC BLOOD PRESSURE: 170 MMHG | DIASTOLIC BLOOD PRESSURE: 76 MMHG | TEMPERATURE: 98 F | RESPIRATION RATE: 18 BRPM | HEART RATE: 83 BPM | OXYGEN SATURATION: 97 %

## 2025-04-02 DIAGNOSIS — K04.7 ABSCESSED TOOTH: Primary | ICD-10-CM

## 2025-04-02 DIAGNOSIS — R03.0 ELEVATED BLOOD PRESSURE READING: ICD-10-CM

## 2025-04-02 PROCEDURE — 99283 EMERGENCY DEPT VISIT LOW MDM: CPT

## 2025-04-02 PROCEDURE — 6370000000 HC RX 637 (ALT 250 FOR IP): Performed by: PHYSICIAN ASSISTANT

## 2025-04-02 RX ADMIN — AMOXICILLIN AND CLAVULANATE POTASSIUM 1 TABLET: 875; 125 TABLET, FILM COATED ORAL at 22:55

## 2025-04-02 ASSESSMENT — PAIN - FUNCTIONAL ASSESSMENT
PAIN_FUNCTIONAL_ASSESSMENT: 0-10
PAIN_FUNCTIONAL_ASSESSMENT: PREVENTS OR INTERFERES SOME ACTIVE ACTIVITIES AND ADLS

## 2025-04-02 ASSESSMENT — PAIN DESCRIPTION - DESCRIPTORS: DESCRIPTORS: ACHING

## 2025-04-02 ASSESSMENT — PAIN DESCRIPTION - LOCATION: LOCATION: MOUTH

## 2025-04-02 ASSESSMENT — PAIN SCALES - GENERAL: PAINLEVEL_OUTOF10: 8

## 2025-04-03 NOTE — ED PROVIDER NOTES
[facility] EMERGENCY DEPARTMENT  EMERGENCY DEPARTMENT ENCOUNTER        Pt Name: Davide Manriquez  MRN: [id number]  Birthdate 1950  Date of evaluation: 2025  Provider: Jordyn Willis PA-C  PCP: Lea Dos Santos MD  Note Started: 10:53 PM EDT 25      KELLIE. I have evaluated this patient.        CHIEF COMPLAINT       Chief Complaint   Patient presents with    Dental Pain     Pt with left lower dental pain whose onset was 2 days ago.  Pt states her family says her face is a touch swollen ut she can't see a difference.  Pt denies fever.        HISTORY OF PRESENT ILLNESS: 1 or more Elements     History From: patient     Davide Manriquez is a 74 y.o. female who presents for left lower dental pain for the past few days but has been getting worse.  Her dentist is out of the office this week.  Thought she could wait to see her dentist until next week, but pain is getting worse so came to the ED.  Denies fever    Nursing Notes were reviewed and agreed with or any disagreements were addressed in the HPI.    REVIEW OF SYSTEMS :      Review of Systems    Positives and Pertinent negatives as per HPI.     SURGICAL HISTORY     Past Surgical History:   Procedure Laterality Date    CATARACT EXTRACTION Bilateral      SECTION      x 1     EYE SURGERY      right eye- cataract     TONSILLECTOMY         CURRENTMEDICATIONS       Discharge Medication List as of 2025 10:59 PM        CONTINUE these medications which have NOT CHANGED    Details   losartan-hydroCHLOROthiazide (HYZAAR) 100-25 MG per tablet Take 1 tablet by mouth daily, Disp-90 tablet, R-1Normal      rosuvastatin (CRESTOR) 20 MG tablet Take 1 tablet by mouth daily, Disp-90 tablet, R-1Normal      vitamin D (ERGOCALCIFEROL) 1.25 MG (62231 UT) CAPS capsule TAKE 1 CAPSULE BY MOUTH 1 TIME A WEEK, Disp-12 capsule, R-0Normal      LANTUS 100 UNIT/ML injection vial INJECT 32 UNITS UNDER THE SKIN TWICE DAILY, Disp-30 mL, R-3, DAWNormal

## 2025-04-03 NOTE — DISCHARGE INSTRUCTIONS
Dental Emergency Referrals    [facility] Department Clinics ([place] residents only)    [facility]  [address] (25)  [phone]   [facility]  [address]  [phone]   [facility]  [address]  [phone]   [facility]  (entrance on [facility]. [address]  [address]  [phone]   [facility]  [address]  [phone]   [facility]  [address]  [phone]       [place] Clinics offering Dental Services     [facility]  [address]  [phone]   [facility]  [address]  [phone]     [facility]  [address] 2nd floor  [phone]   [facility]  [address] (67)   [phone]     [facility]  [place]: [address]  [place]: [address]   [phone]   [facility]  [address]  [phone]     [facility]  [address]  [phone]   [facility]  [address]  [phone]     [address]  Oral Surgery Dept: [phone]  Dental Clinic: [phone]   [facility]  [address]  [phone]     [facility]  [address] [phone]   [facility]   [address], [place] [address]  [place] : [phone]  [place] : [phone]     [facility]  [address]  [phone]    Other Dental Clinics in the area    [facility] (D[facility])  [place]  (Across from [facility])  [address] [place] [address] [phone]?      Pediatric Only Dentists    [facility]   Up to age 21  [address]  [phone]    [facility]  Up to age 21  [place]:

## 2025-04-04 ENCOUNTER — CARE COORDINATION (OUTPATIENT)
Dept: CARE COORDINATION | Age: 75
End: 2025-04-04

## 2025-04-04 NOTE — CARE COORDINATION
Ambulatory Care Coordination Note     4/4/2025 8:52 AM     Patient outreach attempt by this ACM today to offer care management services. ACM was unable to reach the patient by telephone today;   left voice message requesting a return phone call to this ACM.  Routed message to PCP office for assist with EDFU     ACM: Nadine Camarillo, RN     Care Summary Note: UTRx1    PCP/Specialist follow up:   Future Appointments         Provider Specialty Dept Phone    7/2/2025 10:30 AM Rasheeda Dos Santos MD Endocrinology [phone]    8/7/2025 11:30 AM SCHEDULE, [facility] VY LEAHY LPN Primary Care [phone]            Follow Up:   Plan for next ACM outreach in approximately 1 week to complete:  - outreach attempt to offer care management services.        Nadine WATKINS, RN     Ambulatory Care Manager    [facility]    Phone: [phone]    [email or web address]

## 2025-04-10 ENCOUNTER — CARE COORDINATION (OUTPATIENT)
Dept: CARE COORDINATION | Age: 75
End: 2025-04-10

## 2025-04-10 NOTE — CARE COORDINATION
Ambulatory Care Coordination Note     4/10/2025 4:12 PM     patient outreach attempt by this ACM today to offer care management services. ACM was unable to reach the patient by telephone today;   left voice message requesting a return phone call to this ACM.  [employer] message sent requesting patient to contact this ACM.     Patient closed (unable to reach patient) from the High Risk Care Management program on 4/10/2025.    No further Ambulatory Care Manager follow up scheduled.       Nadine WATKINS, RN     Ambulatory Care Manager    [facility]    Phone: [phone]    [email or web address]

## 2025-05-10 ENCOUNTER — OFFICE VISIT (OUTPATIENT)
Age: 75
End: 2025-05-10

## 2025-05-10 VITALS
BODY MASS INDEX: 35.13 KG/M2 | HEART RATE: 93 BPM | HEIGHT: 66 IN | DIASTOLIC BLOOD PRESSURE: 76 MMHG | TEMPERATURE: 98.7 F | WEIGHT: 218.6 LBS | OXYGEN SATURATION: 97 % | SYSTOLIC BLOOD PRESSURE: 145 MMHG

## 2025-05-10 DIAGNOSIS — I10 ESSENTIAL HYPERTENSION: ICD-10-CM

## 2025-05-10 DIAGNOSIS — J40 BRONCHITIS: Primary | ICD-10-CM

## 2025-05-10 RX ORDER — OLOPATADINE HYDROCHLORIDE 2 MG/ML
1 SOLUTION/ DROPS OPHTHALMIC DAILY
COMMUNITY

## 2025-05-10 RX ORDER — DOXYCYCLINE 100 MG/1
100 CAPSULE ORAL 2 TIMES DAILY
COMMUNITY
Start: 2025-02-25

## 2025-05-10 RX ORDER — DEXTROMETHORPHAN HYDROBROMIDE AND PROMETHAZINE HYDROCHLORIDE 15; 6.25 MG/5ML; MG/5ML
5 SYRUP ORAL NIGHTLY
Qty: 35 ML | Refills: 0 | Status: SHIPPED | OUTPATIENT
Start: 2025-05-10 | End: 2025-05-17

## 2025-05-10 NOTE — PROGRESS NOTES
Davide Manriquez (:  1950) is a 75 y.o. female,New patient, here for evaluation of the following chief complaint(s):  Other (Congested per patient that it started about a week ago. She has tried OTC medications.)      Assessment & Plan :  Visit Diagnoses and Associated Orders         Bronchitis    -  Primary    amoxicillin-clavulanate (AUGMENTIN) 875-125 MG per tablet [18665]      promethazine-dextromethorphan (PROMETHAZINE-DM) 6.25-15 MG/5ML syrup [50022]             Essential hypertension        Amb Referral to Primary Care [BSA281 Custom]           ORDERS WITHOUT AN ASSOCIATED DIAGNOSIS    olopatadine (PATADAY) 0.2 % SOLN ophthalmic solution [64723]      doxycycline hyclate (VIBRAMYCIN) 100 MG capsule [2623]            No point of care tests were completed  Symptoms include congestion, cough, headaches, runny nose, with exam findings of congestion, there is concern Acute Bacterial Bronchitis  Low concern for respiratory distress, community acquired pneumonia, PE otitis media, strep pharyngitis,  Recommended:  OTC Pseudoephedrine, Flonase, Zyrtec, and Saline nasal spray for congestion relief, advised to avoid with hx HTN or cardiac issues  Mucinex DM for cough with expectorant relief or plain DM if needed, advised to not use this during the night or with other DM products  Acetaminophen (Tylenol) and/or ibuprofen (Motrin, Advil) for aches/pains as needed, provided there are no contraindications  [place] diet, increased fluids, ginger candies for nausea/vomiting relief  Sipping on warm beverages (tea with honey), ice cream, popsicles, sore throat lozenges, Chloraseptic spray, warm salt water gargles for sore throat relief  Dx of HTN: OTC Coricidin, Flonase, Zyrtec, and Saline nasal spray for congestion relief  Prescribed:   Promethazine-DM prescribed for nightime cough relief, discussed this is a drowsy med and to avoid operating heavy machinery while taking  Augmentin for acute bacterial

## 2025-05-10 NOTE — PATIENT INSTRUCTIONS
No point of care tests were completed  Symptoms include congestion, cough, headaches, runny nose, with exam findings of congestion, there is concern Acute Bacterial Bronchitis  Low concern for respiratory distress, community acquired pneumonia, PE otitis media, strep pharyngitis,  Recommended:  OTC Pseudoephedrine, Flonase, Zyrtec, and Saline nasal spray for congestion relief, advised to avoid with hx HTN or cardiac issues  Mucinex DM for cough with expectorant relief or plain DM if needed, advised to not use this during the night or with other DM products  Acetaminophen (Tylenol) and/or ibuprofen (Motrin, Advil) for aches/pains as needed, provided there are no contraindications  [place] diet, increased fluids, ginger candies for nausea/vomiting relief  Sipping on warm beverages (tea with honey), ice cream, popsicles, sore throat lozenges, Chloraseptic spray, warm salt water gargles for sore throat relief  Dx of HTN: OTC Coricidin, Flonase, Zyrtec, and Saline nasal spray for congestion relief  Prescribed:   Promethazine-DM prescribed for nightime cough relief, discussed this is a drowsy med and to avoid operating heavy machinery while taking  Augmentin for acute bacterial bronchitis  Strict ED follow up instructions provided

## 2025-05-12 ENCOUNTER — TELEPHONE (OUTPATIENT)
Dept: PRIMARY CARE CLINIC | Age: 75
End: 2025-05-12

## 2025-05-12 NOTE — TELEPHONE ENCOUNTER
Pt was at urgent care on 5/10/25 and diagnosed with bronchitis and stated she got antibiotics and cough medicine and sated it is not working and asked if  could prescribe something else.     Please call pt.

## 2025-05-12 NOTE — PROGRESS NOTES
Morbidly obese (HCC) 10/29/2020    Nonintractable headache 05/21/2023    Sinusitis 04/11/2022    Tinnitus     Type II or unspecified type diabetes mellitus without mention of complication, not stated as uncontrolled     Uncontrolled type 2 diabetes mellitus with diabetic nephropathy 03/17/2021    Urinary, incontinence, stress female 04/28/2020    Vitamin D deficiency 11/13/2019       PHYSICAL EXAMINATION:  [ INSTRUCTIONS:  \"[x]\" Indicates a positive item  \"[]\" Indicates a negative item  -- DELETE ALL ITEMS NOT EXAMINED]  Vital Signs: (As obtained by patient/caregiver or practitioner observation)    Blood pressure-  Heart rate-    Respiratory rate-    Temperature-  Pulse oximetry-     Constitutional: [x] Appears well-developed and well-nourished [x] No apparent distress      [] Abnormal-   Mental status  [x] Alert and awake  [x] Oriented to person/place/time [x]Able to follow commands      Eyes:  EOM    []  Normal  [] Abnormal-  Sclera  []  Normal  [] Abnormal -         Discharge []  None visible  [] Abnormal -    HENT:   [x] Normocephalic, atraumatic.  [] Abnormal   [x] Mouth/Throat: Mucous membranes are moist.     External Ears [] Normal  [] Abnormal-     Neck: [] No visualized mass     Pulmonary/Chest: [x] Respiratory effort normal.  [x] No visualized signs of difficulty breathing or respiratory distress        [] Abnormal-cough noted     Musculoskeletal:   [] Normal gait with no signs of ataxia         [] Normal range of motion of neck        [] Abnormal-       Neurological:        [x] No Facial Asymmetry (Cranial nerve 7 motor function) (limited exam to video visit)          [] No gaze palsy        [] Abnormal-         Skin:        [x] No significant exanthematous lesions or discoloration noted on facial skin         [] Abnormal-            Psychiatric:       [x] Normal Affect [x] No Hallucinations        [] Abnormal-     Other pertinent observable physical exam findings-     ASSESSMENT/PLAN:  1.

## 2025-05-13 ENCOUNTER — TELEMEDICINE (OUTPATIENT)
Dept: PRIMARY CARE CLINIC | Age: 75
End: 2025-05-13

## 2025-05-13 DIAGNOSIS — J40 BRONCHITIS: Primary | ICD-10-CM

## 2025-05-13 RX ORDER — CODEINE PHOSPHATE AND GUAIFENESIN 10; 100 MG/5ML; MG/5ML
5 SOLUTION ORAL 3 TIMES DAILY PRN
Qty: 118 ML | Refills: 0 | Status: SHIPPED | OUTPATIENT
Start: 2025-05-13 | End: 2025-05-20

## 2025-05-13 ASSESSMENT — ENCOUNTER SYMPTOMS
VOICE CHANGE: 0
COUGH: 1
ABDOMINAL PAIN: 0
DIARRHEA: 0
SINUS PRESSURE: 0
NAUSEA: 0
SHORTNESS OF BREATH: 0
SORE THROAT: 0
RHINORRHEA: 1
CHEST TIGHTNESS: 0
TROUBLE SWALLOWING: 0
VOMITING: 0
WHEEZING: 0
SINUS PAIN: 0

## 2025-06-20 ENCOUNTER — OFFICE VISIT (OUTPATIENT)
Dept: PRIMARY CARE CLINIC | Age: 75
End: 2025-06-20

## 2025-06-20 VITALS
HEIGHT: 66 IN | BODY MASS INDEX: 34.72 KG/M2 | SYSTOLIC BLOOD PRESSURE: 134 MMHG | TEMPERATURE: 96.9 F | RESPIRATION RATE: 16 BRPM | HEART RATE: 65 BPM | WEIGHT: 216 LBS | DIASTOLIC BLOOD PRESSURE: 64 MMHG | OXYGEN SATURATION: 98 %

## 2025-06-20 DIAGNOSIS — M79.604 RIGHT LEG PAIN: ICD-10-CM

## 2025-06-20 DIAGNOSIS — I10 ESSENTIAL HYPERTENSION: ICD-10-CM

## 2025-06-20 DIAGNOSIS — N18.32 STAGE 3B CHRONIC KIDNEY DISEASE (HCC): ICD-10-CM

## 2025-06-20 DIAGNOSIS — K21.9 LARYNGOPHARYNGEAL REFLUX DISEASE: ICD-10-CM

## 2025-06-20 DIAGNOSIS — E55.9 VITAMIN D DEFICIENCY: ICD-10-CM

## 2025-06-20 DIAGNOSIS — Z12.31 ENCOUNTER FOR SCREENING MAMMOGRAM FOR BREAST CANCER: ICD-10-CM

## 2025-06-20 DIAGNOSIS — E78.2 MIXED HYPERLIPIDEMIA: ICD-10-CM

## 2025-06-20 DIAGNOSIS — Z78.0 POSTMENOPAUSE: ICD-10-CM

## 2025-06-20 DIAGNOSIS — Z00.00 MEDICARE ANNUAL WELLNESS VISIT, SUBSEQUENT: Primary | ICD-10-CM

## 2025-06-20 RX ORDER — ERGOCALCIFEROL 1.25 MG/1
CAPSULE, LIQUID FILLED ORAL
Qty: 12 CAPSULE | Refills: 1 | Status: SHIPPED | OUTPATIENT
Start: 2025-06-20

## 2025-06-20 SDOH — ECONOMIC STABILITY: FOOD INSECURITY: WITHIN THE PAST 12 MONTHS, YOU WORRIED THAT YOUR FOOD WOULD RUN OUT BEFORE YOU GOT MONEY TO BUY MORE.: NEVER TRUE

## 2025-06-20 SDOH — ECONOMIC STABILITY: FOOD INSECURITY: WITHIN THE PAST 12 MONTHS, THE FOOD YOU BOUGHT JUST DIDN'T LAST AND YOU DIDN'T HAVE MONEY TO GET MORE.: NEVER TRUE

## 2025-06-20 ASSESSMENT — PATIENT HEALTH QUESTIONNAIRE - PHQ9
SUM OF ALL RESPONSES TO PHQ QUESTIONS 1-9: 0
1. LITTLE INTEREST OR PLEASURE IN DOING THINGS: NOT AT ALL
SUM OF ALL RESPONSES TO PHQ QUESTIONS 1-9: 0
SUM OF ALL RESPONSES TO PHQ QUESTIONS 1-9: 0
2. FEELING DOWN, DEPRESSED OR HOPELESS: NOT AT ALL
SUM OF ALL RESPONSES TO PHQ QUESTIONS 1-9: 0

## 2025-06-20 NOTE — PROGRESS NOTES
Due: see orders and patient instructions/AVS.  Recommended screening schedule for the next 5-10 years is provided to the patient in written form: see Patient Instructions/AVS.     Reviewed and updated this visit:  Tobacco  Allergies  Meds  Problems  Sexual Hx                  The patient (or guardian, if applicable) and other individuals in attendance with the patient were advised that Artificial Intelligence will be utilized during this visit to record and process the conversation to generate a clinical note. The patient (or guardian, if applicable) and other individuals in attendance at the appointment consented to the use of AI, including the recording.

## 2025-06-27 DIAGNOSIS — E11.40 POORLY CONTROLLED TYPE 2 DIABETES MELLITUS WITH NEUROPATHY (HCC): ICD-10-CM

## 2025-06-27 DIAGNOSIS — M79.604 RIGHT LEG PAIN: ICD-10-CM

## 2025-06-27 DIAGNOSIS — E11.65 POORLY CONTROLLED TYPE 2 DIABETES MELLITUS WITH RETINOPATHY (HCC): ICD-10-CM

## 2025-06-27 DIAGNOSIS — E55.9 VITAMIN D DEFICIENCY: ICD-10-CM

## 2025-06-27 DIAGNOSIS — E78.2 MIXED HYPERLIPIDEMIA: ICD-10-CM

## 2025-06-27 DIAGNOSIS — E11.21 DIABETIC NEPHROPATHY ASSOCIATED WITH TYPE 2 DIABETES MELLITUS (HCC): ICD-10-CM

## 2025-06-27 DIAGNOSIS — E11.65 POORLY CONTROLLED TYPE 2 DIABETES MELLITUS WITH NEUROPATHY (HCC): ICD-10-CM

## 2025-06-27 DIAGNOSIS — E11.319 POORLY CONTROLLED TYPE 2 DIABETES MELLITUS WITH RETINOPATHY (HCC): ICD-10-CM

## 2025-06-27 LAB
25(OH)D3 SERPL-MCNC: 48.7 NG/ML
ALBUMIN SERPL-MCNC: 3.9 G/DL (ref 3.4–5)
ALBUMIN/GLOB SERPL: 1.4 {RATIO} (ref 1.1–2.2)
ALP SERPL-CCNC: 103 U/L (ref 40–129)
ALT SERPL-CCNC: 15 U/L (ref 10–40)
ANION GAP SERPL CALCULATED.3IONS-SCNC: 10 MMOL/L (ref 3–16)
AST SERPL-CCNC: 18 U/L (ref 15–37)
BILIRUB SERPL-MCNC: 0.3 MG/DL (ref 0–1)
BUN SERPL-MCNC: 26 MG/DL (ref 7–20)
CALCIUM SERPL-MCNC: 8.9 MG/DL (ref 8.3–10.6)
CHLORIDE SERPL-SCNC: 102 MMOL/L (ref 99–110)
CHOLEST SERPL-MCNC: 125 MG/DL (ref 0–199)
CK SERPL-CCNC: 176 U/L (ref 26–192)
CO2 SERPL-SCNC: 28 MMOL/L (ref 21–32)
CREAT SERPL-MCNC: 1.3 MG/DL (ref 0.6–1.2)
CREAT UR-MCNC: 109 MG/DL (ref 28–259)
EST. AVERAGE GLUCOSE BLD GHB EST-MCNC: 214.5 MG/DL
GFR SERPLBLD CREATININE-BSD FMLA CKD-EPI: 43 ML/MIN/{1.73_M2}
GLUCOSE SERPL-MCNC: 129 MG/DL (ref 70–99)
HBA1C MFR BLD: 9.1 %
HDLC SERPL-MCNC: 35 MG/DL (ref 40–60)
LDL CHOLESTEROL: 69 MG/DL
MAGNESIUM SERPL-MCNC: 1.63 MG/DL (ref 1.8–2.4)
MICROALBUMIN UR DL<=1MG/L-MCNC: 31.3 MG/DL
MICROALBUMIN/CREAT UR: 287.2 MG/G (ref 0–30)
POTASSIUM SERPL-SCNC: 3.9 MMOL/L (ref 3.5–5.1)
PROT SERPL-MCNC: 6.7 G/DL (ref 6.4–8.2)
SODIUM SERPL-SCNC: 140 MMOL/L (ref 136–145)
TRIGL SERPL-MCNC: 107 MG/DL (ref 0–150)
VLDLC SERPL CALC-MCNC: 21 MG/DL

## 2025-07-02 ENCOUNTER — TELEMEDICINE (OUTPATIENT)
Dept: ENDOCRINOLOGY | Age: 75
End: 2025-07-02
Payer: MEDICARE

## 2025-07-02 DIAGNOSIS — E11.21 DIABETIC NEPHROPATHY ASSOCIATED WITH TYPE 2 DIABETES MELLITUS (HCC): ICD-10-CM

## 2025-07-02 DIAGNOSIS — E11.40 POORLY CONTROLLED TYPE 2 DIABETES MELLITUS WITH NEUROPATHY (HCC): Primary | ICD-10-CM

## 2025-07-02 DIAGNOSIS — E78.2 MIXED HYPERLIPIDEMIA: ICD-10-CM

## 2025-07-02 DIAGNOSIS — E11.65 POORLY CONTROLLED TYPE 2 DIABETES MELLITUS WITH NEUROPATHY (HCC): Primary | ICD-10-CM

## 2025-07-02 DIAGNOSIS — E11.319 POORLY CONTROLLED TYPE 2 DIABETES MELLITUS WITH RETINOPATHY (HCC): ICD-10-CM

## 2025-07-02 DIAGNOSIS — E11.65 POORLY CONTROLLED TYPE 2 DIABETES MELLITUS WITH RETINOPATHY (HCC): ICD-10-CM

## 2025-07-02 DIAGNOSIS — I10 ESSENTIAL HYPERTENSION: ICD-10-CM

## 2025-07-02 DIAGNOSIS — E66.811 CLASS 1 OBESITY WITH SERIOUS COMORBIDITY AND BODY MASS INDEX (BMI) OF 33.0 TO 33.9 IN ADULT, UNSPECIFIED OBESITY TYPE: ICD-10-CM

## 2025-07-02 DIAGNOSIS — N18.30 STAGE 3 CHRONIC KIDNEY DISEASE, UNSPECIFIED WHETHER STAGE 3A OR 3B CKD (HCC): ICD-10-CM

## 2025-07-02 DIAGNOSIS — E55.9 VITAMIN D DEFICIENCY: ICD-10-CM

## 2025-07-02 PROCEDURE — 3046F HEMOGLOBIN A1C LEVEL >9.0%: CPT | Performed by: INTERNAL MEDICINE

## 2025-07-02 PROCEDURE — G2211 COMPLEX E/M VISIT ADD ON: HCPCS | Performed by: INTERNAL MEDICINE

## 2025-07-02 PROCEDURE — 99214 OFFICE O/P EST MOD 30 MIN: CPT | Performed by: INTERNAL MEDICINE

## 2025-07-02 PROCEDURE — 1160F RVW MEDS BY RX/DR IN RCRD: CPT | Performed by: INTERNAL MEDICINE

## 2025-07-02 PROCEDURE — 1159F MED LIST DOCD IN RCRD: CPT | Performed by: INTERNAL MEDICINE

## 2025-07-02 PROCEDURE — 1123F ACP DISCUSS/DSCN MKR DOCD: CPT | Performed by: INTERNAL MEDICINE

## 2025-07-02 RX ORDER — BLOOD SUGAR DIAGNOSTIC
1 STRIP MISCELLANEOUS
Qty: 150 EACH | Refills: 5 | Status: SHIPPED | OUTPATIENT
Start: 2025-07-02

## 2025-07-02 RX ORDER — INSULIN LISPRO 100 [IU]/ML
INJECTION, SOLUTION INTRAVENOUS; SUBCUTANEOUS
Qty: 60 ML | Refills: 1 | Status: SHIPPED | OUTPATIENT
Start: 2025-07-02

## 2025-07-02 RX ORDER — INSULIN GLARGINE 100 [IU]/ML
INJECTION, SOLUTION SUBCUTANEOUS
Qty: 60 ML | Refills: 3 | Status: SHIPPED | OUTPATIENT
Start: 2025-07-02

## 2025-07-02 NOTE — PROGRESS NOTES
Davide Manriquez is a 75 y.o. female who is being evaluated virtually for Type 2 diabetes mellitus.   H.    Current symptoms/problems include hyperglycemia and are worsening.     Poorly controlled type 2 diabetes mellitus with neuropathy ([facility]) [E11.40, E11.65]      Diagnosed with Type 2 diabetes mellitus in .     Comorbid conditions:  Hypertension, hyperlipidemia, obesity, Neuropathy, Nephropathy and Retinopathy     Current diabetic medications include: Lantus 32 units twice daily, Humalog 8 units 3 times daily with meals  Correction scale of Humalo-200-2 units, 201-250-4 units, 251-300-6 units, >301 -units 8 units   Intolerance to diabetes medications: Yes Actos-leg swelling, Metformin  Patient prefers do not take Trulicity     Has tendency to have UTIs and yeast infections  Weight trend: stable  Prior visit with dietician: yes  Current diet: on average, 2 meals per day  Current exercise: no     Current monitoring regimen: home blood tests - 4 times daily  Has brought blood glucose log/meter:  No  Home blood sugar records: fasting range: 100-130 and postprandial range: 100-130  Any episodes of hypoglycemia? No  Hypoglycemia frequency and time(s):    Does patient have Glucagon emergency kit? No  Does patient have rapid acting carbohydrate?  No  Does patient wear a medic alert bracelet or necklace?  No      2.  Diabetic nephropathy ([facility])  No urination problems     3. Type 2 diabetes, poorly controlled, with retinopathy ([facility])  No vision changes     4. Vitamin D deficiency  No fatigue     5. Mixed hyperlipidemia  No muscle pain     6. Essential hypertension  No headaches     7.  Obesity   Eats healthy     8.  CKD   No urination problems     Past Medical History:   Diagnosis Date    Allergic rhinitis     Dizziness     Hearing loss     Hyperlipidemia     Hypertension     Kidney disease     Laryngopharyngeal reflux disease 2019    Morbidly obese (HCC) 10/29/2020    Nonintractable headache 2023

## 2025-07-06 PROBLEM — Z78.0 POSTMENOPAUSE: Status: ACTIVE | Noted: 2025-07-06

## 2025-07-06 PROBLEM — M79.604 RIGHT LEG PAIN: Status: ACTIVE | Noted: 2025-07-06

## 2025-07-06 PROBLEM — Z12.31 ENCOUNTER FOR SCREENING MAMMOGRAM FOR BREAST CANCER: Status: ACTIVE | Noted: 2025-07-06

## 2025-07-06 PROBLEM — Z00.00 MEDICARE ANNUAL WELLNESS VISIT, SUBSEQUENT: Status: ACTIVE | Noted: 2025-07-06

## 2025-07-18 ENCOUNTER — HOSPITAL ENCOUNTER (OUTPATIENT)
Dept: VASCULAR LAB | Age: 75
Discharge: HOME | End: 2025-07-20
Payer: MEDICARE

## 2025-07-18 ENCOUNTER — HOSPITAL ENCOUNTER (OUTPATIENT)
Dept: MAMMOGRAPHY | Age: 75
Discharge: HOME | End: 2025-07-18
Payer: MEDICARE

## 2025-07-18 ENCOUNTER — HOSPITAL ENCOUNTER (OUTPATIENT)
Dept: GENERAL RADIOLOGY | Age: 75
Discharge: HOME | End: 2025-07-18
Payer: MEDICARE

## 2025-07-18 VITALS — WEIGHT: 216 LBS | HEIGHT: 66 IN | BODY MASS INDEX: 34.72 KG/M2

## 2025-07-18 DIAGNOSIS — M79.604 RIGHT LEG PAIN: ICD-10-CM

## 2025-07-18 DIAGNOSIS — Z12.31 ENCOUNTER FOR SCREENING MAMMOGRAM FOR BREAST CANCER: ICD-10-CM

## 2025-07-18 DIAGNOSIS — Z78.0 POSTMENOPAUSE: ICD-10-CM

## 2025-07-18 PROCEDURE — 77063 BREAST TOMOSYNTHESIS BI: CPT

## 2025-07-18 PROCEDURE — 93971 EXTREMITY STUDY: CPT

## 2025-07-18 PROCEDURE — 77080 DXA BONE DENSITY AXIAL: CPT

## 2025-07-18 PROCEDURE — 93971 EXTREMITY STUDY: CPT | Performed by: SURGERY
